# Patient Record
Sex: MALE | Race: WHITE | NOT HISPANIC OR LATINO | Employment: FULL TIME | URBAN - METROPOLITAN AREA
[De-identification: names, ages, dates, MRNs, and addresses within clinical notes are randomized per-mention and may not be internally consistent; named-entity substitution may affect disease eponyms.]

---

## 2017-01-06 ENCOUNTER — ALLSCRIPTS OFFICE VISIT (OUTPATIENT)
Dept: OTHER | Facility: OTHER | Age: 57
End: 2017-01-06

## 2017-02-03 ENCOUNTER — ALLSCRIPTS OFFICE VISIT (OUTPATIENT)
Dept: OTHER | Facility: OTHER | Age: 57
End: 2017-02-03

## 2017-04-03 ENCOUNTER — ALLSCRIPTS OFFICE VISIT (OUTPATIENT)
Dept: OTHER | Facility: OTHER | Age: 57
End: 2017-04-03

## 2017-04-03 LAB
FLUAV AG SPEC QL IA: NEGATIVE
INFLUENZA B AG (HISTORICAL): NEGATIVE

## 2017-04-17 ENCOUNTER — ALLSCRIPTS OFFICE VISIT (OUTPATIENT)
Dept: OTHER | Facility: OTHER | Age: 57
End: 2017-04-17

## 2017-04-25 ENCOUNTER — TRANSCRIBE ORDERS (OUTPATIENT)
Dept: ADMINISTRATIVE | Facility: HOSPITAL | Age: 57
End: 2017-04-25

## 2017-04-25 ENCOUNTER — APPOINTMENT (OUTPATIENT)
Dept: LAB | Facility: HOSPITAL | Age: 57
End: 2017-04-25
Payer: COMMERCIAL

## 2017-04-25 DIAGNOSIS — I10 ESSENTIAL HYPERTENSION, MALIGNANT: ICD-10-CM

## 2017-04-25 DIAGNOSIS — I10 ESSENTIAL HYPERTENSION, MALIGNANT: Primary | ICD-10-CM

## 2017-04-25 LAB — VENIPUNCTURE: NORMAL

## 2017-04-25 PROCEDURE — 36415 COLL VENOUS BLD VENIPUNCTURE: CPT

## 2017-04-26 ENCOUNTER — LAB CONVERSION - ENCOUNTER (OUTPATIENT)
Dept: OTHER | Facility: OTHER | Age: 57
End: 2017-04-26

## 2017-04-26 LAB
A/G RATIO (HISTORICAL): 1.6 (CALC) (ref 1–2.5)
ALBUMIN SERPL BCP-MCNC: 4.2 G/DL (ref 3.6–5.1)
ALP SERPL-CCNC: 102 U/L (ref 40–115)
ALT SERPL W P-5'-P-CCNC: 37 U/L (ref 9–46)
AST SERPL W P-5'-P-CCNC: 30 U/L (ref 10–35)
BASOPHILS # BLD AUTO: 0.5 %
BASOPHILS # BLD AUTO: 22 CELLS/UL (ref 0–200)
BILIRUB SERPL-MCNC: 0.8 MG/DL (ref 0.2–1.2)
BUN SERPL-MCNC: 9 MG/DL (ref 7–25)
BUN/CREA RATIO (HISTORICAL): ABNORMAL (CALC) (ref 6–22)
CALCIUM SERPL-MCNC: 9.5 MG/DL (ref 8.6–10.3)
CHLORIDE SERPL-SCNC: 105 MMOL/L (ref 98–110)
CHOLEST SERPL-MCNC: 127 MG/DL (ref 125–200)
CHOLEST/HDLC SERPL: 3 (CALC)
CO2 SERPL-SCNC: 24 MMOL/L (ref 20–31)
CREAT SERPL-MCNC: 0.85 MG/DL (ref 0.7–1.33)
DEPRECATED RDW RBC AUTO: 13.8 % (ref 11–15)
EGFR AFRICAN AMERICAN (HISTORICAL): 112 ML/MIN/1.73M2
EGFR-AMERICAN CALC (HISTORICAL): 97 ML/MIN/1.73M2
EOSINOPHIL # BLD AUTO: 1.1 %
EOSINOPHIL # BLD AUTO: 48 CELLS/UL (ref 15–500)
GAMMA GLOBULIN (HISTORICAL): 2.6 G/DL (CALC) (ref 1.9–3.7)
GLUCOSE (HISTORICAL): 105 MG/DL (ref 65–99)
HBA1C MFR BLD HPLC: 8.7 % OF TOTAL HGB
HCT VFR BLD AUTO: 42 % (ref 38.5–50)
HDLC SERPL-MCNC: 43 MG/DL
HGB BLD-MCNC: 13.8 G/DL (ref 13.2–17.1)
LDL CHOLESTEROL (HISTORICAL): 70 MG/DL (CALC)
LYMPHOCYTES # BLD AUTO: 1355 CELLS/UL (ref 850–3900)
LYMPHOCYTES # BLD AUTO: 30.8 %
MCH RBC QN AUTO: 29.3 PG (ref 27–33)
MCHC RBC AUTO-ENTMCNC: 32.9 G/DL (ref 32–36)
MCV RBC AUTO: 89.1 FL (ref 80–100)
MONOCYTES # BLD AUTO: 414 CELLS/UL (ref 200–950)
MONOCYTES (HISTORICAL): 9.4 %
NEUTROPHILS # BLD AUTO: 2561 CELLS/UL (ref 1500–7800)
NEUTROPHILS # BLD AUTO: 58.2 %
NON-HDL-CHOL (CHOL-HDL) (HISTORICAL): 84 MG/DL (CALC)
PLATELET # BLD AUTO: 206 THOUSAND/UL (ref 140–400)
PMV BLD AUTO: 10.7 FL (ref 7.5–12.5)
POTASSIUM SERPL-SCNC: 4.1 MMOL/L (ref 3.5–5.3)
RBC # BLD AUTO: 4.71 MILLION/UL (ref 4.2–5.8)
SODIUM SERPL-SCNC: 140 MMOL/L (ref 135–146)
TOTAL PROTEIN (HISTORICAL): 6.8 G/DL (ref 6.1–8.1)
TRIGL SERPL-MCNC: 70 MG/DL
TSH SERPL DL<=0.05 MIU/L-ACNC: 1.87 MIU/L (ref 0.4–4.5)
WBC # BLD AUTO: 4.4 THOUSAND/UL (ref 3.8–10.8)

## 2017-04-27 ENCOUNTER — GENERIC CONVERSION - ENCOUNTER (OUTPATIENT)
Dept: OTHER | Facility: OTHER | Age: 57
End: 2017-04-27

## 2017-05-01 ENCOUNTER — ALLSCRIPTS OFFICE VISIT (OUTPATIENT)
Dept: OTHER | Facility: OTHER | Age: 57
End: 2017-05-01

## 2017-06-19 ENCOUNTER — ALLSCRIPTS OFFICE VISIT (OUTPATIENT)
Dept: OTHER | Facility: OTHER | Age: 57
End: 2017-06-19

## 2017-06-27 ENCOUNTER — GENERIC CONVERSION - ENCOUNTER (OUTPATIENT)
Dept: OTHER | Facility: OTHER | Age: 57
End: 2017-06-27

## 2018-01-12 NOTE — MISCELLANEOUS
Provider Comments  Provider Comments:   [facility] TO [facility] APPT        Signatures   Electronically signed by : Lynette Rose DO; Sep 16 2016 11:22AM EST                       (Author)

## 2018-01-13 VITALS
DIASTOLIC BLOOD PRESSURE: 82 MMHG | BODY MASS INDEX: 31.54 KG/M2 | HEART RATE: 82 BPM | RESPIRATION RATE: 16 BRPM | SYSTOLIC BLOOD PRESSURE: 128 MMHG | TEMPERATURE: 96.8 F | HEIGHT: 73 IN | WEIGHT: 238 LBS

## 2018-01-13 VITALS
WEIGHT: 241 LBS | DIASTOLIC BLOOD PRESSURE: 86 MMHG | SYSTOLIC BLOOD PRESSURE: 148 MMHG | HEART RATE: 76 BPM | TEMPERATURE: 96.4 F | HEIGHT: 73 IN | BODY MASS INDEX: 31.94 KG/M2 | RESPIRATION RATE: 20 BRPM

## 2018-01-13 NOTE — RESULT NOTES
Discussion/Summary   Will review labs at your follow up visit with Dr Eros Perez     Verified Results  (1) CBC/PLT/DIFF 25Apr2017 09:15AM Nelida Farooq     Test Name Result Flag Reference   WHITE BLOOD CELL COUNT 4 4 Thousand/uL  3 8-10 8   RED BLOOD CELL COUNT 4 71 Million/uL  4 20-5 80   HEMOGLOBIN 13 8 g/dL  13 2-17 1   HEMATOCRIT 42 0 %  38 5-50 0   MCV 89 1 fL  80 0-100 0   MCH 29 3 pg  27 0-33 0   MCHC 32 9 g/dL  32 0-36 0   RDW 13 8 %  11 0-15 0   PLATELET COUNT 849 Thousand/uL  140-400   ABSOLUTE NEUTROPHILS 2561 cells/uL  5902-7395   ABSOLUTE LYMPHOCYTES 1355 cells/uL  850-3900   ABSOLUTE MONOCYTES 414 cells/uL  200-950   ABSOLUTE EOSINOPHILS 48 cells/uL     ABSOLUTE BASOPHILS 22 cells/uL  0-200   NEUTROPHILS 58 2 %     LYMPHOCYTES 30 8 %     MONOCYTES 9 4 %     EOSINOPHILS 1 1 %     BASOPHILS 0 5 %     MPV 10 7 fL  7 5-12 5     (1) COMPREHENSIVE METABOLIC PANEL [id number] 67:78YN Nelida Farooq     Test Name Result Flag Reference   GLUCOSE 105 mg/dL H 65-99   Fasting reference interval     For someone without known diabetes, a glucose value  between 100 and 125 mg/dL is consistent with  prediabetes and should be confirmed with a  follow-up test    UREA NITROGEN (BUN) 9 mg/dL  7-25   CREATININE 0 85 mg/dL  0 70-1 33   For patients >52years of age, the reference limit  for Creatinine is approximately 13% higher for people  identified as -American  eGFR NON-AFR   AMERICAN 97 mL/min/1 73m2  > OR = 60   eGFR AFRICAN AMERICAN 112 mL/min/1 73m2  > OR = 60   BUN/CREATININE RATIO   9-01   NOT APPLICABLE (calc)   SODIUM 140 mmol/L  135-146   POTASSIUM 4 1 mmol/L  3 5-5 3   CHLORIDE 105 mmol/L     CARBON DIOXIDE 24 mmol/L  20-31   CALCIUM 9 5 mg/dL  8 6-10 3   PROTEIN, TOTAL 6 8 g/dL  6 1-8 1   ALBUMIN 4 2 g/dL  3 6-5 1   GLOBULIN 2 6 g/dL (calc)  1 9-3 7   ALBUMIN/GLOBULIN RATIO 1 6 (calc)  1 0-2 5   BILIRUBIN, TOTAL 0 8 mg/dL  0 2-1 2   ALKALINE PHOSPHATASE 102 U/L     AST 30 U/L  10-35   ALT 37 U/L  9-46     (1) LIPID PANEL, FASTING 25Apr2017 09:15AM Nelida Farooq     Test Name Result Flag Reference   CHOLESTEROL, TOTAL 127 mg/dL  125-200   HDL CHOLESTEROL 43 mg/dL  > OR = 40   TRIGLICERIDES 70 mg/dL  <344   LDL-CHOLESTEROL 70 mg/dL (calc)  <130   Desirable range <100 mg/dL for patients with CHD or  diabetes and <70 mg/dL for diabetic patients with  known heart disease  CHOL/HDLC RATIO 3 0 (calc)  < OR = 5 0   NON HDL CHOLESTEROL 84 mg/dL (calc)     Target for non-HDL cholesterol is 30 mg/dL higher than   LDL cholesterol target  (Q) TSH, 3RD GENERATION W/REFLEX TO FT4 25Apr2017 09:15AM Nelida Farooq     Test Name Result Flag Reference   TSH W/REFLEX TO FT4 1 87 mIU/L  0 40-4 50     (Q) HEMOGLOBIN A1c 25Apr2017 09:15AM Nelida Farooq     Test Name Result Flag Reference   HEMOGLOBIN A1c 8 7 % of total Hgb H <5 7   For someone without known diabetes, a hemoglobin A1c  value of 6 5% or greater indicates that they may have   diabetes and this should be confirmed with a follow-up   test      For someone with known diabetes, a value <7% indicates   that their diabetes is well controlled and a value   greater than or equal to 7% indicates suboptimal   control  A1c targets should be individualized based on   duration of diabetes, age, comorbid conditions, and   other considerations  Currently, no consensus exists regarding use of  hemoglobin A1c for diagnosis of diabetes for children  Signatures   Electronically signed by : Sukumar Honeycutt;  Apr 27 2017  8:29AM EST                       (Author)

## 2018-01-14 VITALS
SYSTOLIC BLOOD PRESSURE: 138 MMHG | HEIGHT: 73 IN | RESPIRATION RATE: 20 BRPM | DIASTOLIC BLOOD PRESSURE: 80 MMHG | WEIGHT: 230 LBS | HEART RATE: 93 BPM | BODY MASS INDEX: 30.48 KG/M2 | TEMPERATURE: 97 F

## 2018-01-14 NOTE — PROGRESS NOTES
Assessment    1  Encounter for preventive health examination (V70 0) (Z00 00)   2  Diabetes mellitus type 2, uncontrolled (250 02) (E11 65)   3  Benign essential hypertension (401 1) (I10)   4  Hypercholesterolemia (272 0) (E78 0)   5  Occult blood in stools (792 1) (R19 5)   6  BMI 30 0-30 9,adult (V85 30) (Z68 30)    Plan  Acute bilateral low back pain without sciatica    · TraMADol HCl - 50 MG Oral Tablet  Acute bilateral low back pain without sciatica, Neck pain    · Cyclobenzaprine HCl - 10 MG Oral Tablet  Benign essential hypertension    · Lisinopril 5 MG Oral Tablet (Prinivil); TAKE 1 TABLET DAILY  Benign essential hypertension, Diabetes mellitus type 2, uncontrolled,  Hypercholesterolemia, Occult blood in stools    · (1) CBC/PLT/DIFF; Status:Active; Requested for:91Rpk2120;    · (1) COMPREHENSIVE METABOLIC PANEL; Status:Active; Requested for:34Ags7674;    · (1) HEMOGLOBIN A1C; Status:Active; Requested for:26Zas5215;    · (1) LIPID PANEL FASTING W DIRECT LDL REFLEX; Status:Active; Requested  for:53Gae4090; Health Maintenance    · Eat a low fat and low cholesterol diet ; Status:Complete;   Done: 77SXA5395   · Eat no more than 30 grams of fat per day ; Status:Complete;   Done: 84KEE2607   · Some eating tips that can help you lose weight ; Status:Complete;   Done: 75SSM7852   · We recommend a colonoscopy ; Status:Complete;   Done: 14ZUR3313  Hypercholesterolemia    · Pravastatin Sodium 10 MG Oral Tablet; TAKE 1 TABLET AT BEDTIME  Occult blood in stools    · COLONOSCOPY; Status:Active; Requested XSI:29FDE8011;    · Hemoccult Screening - POC; Status:Active;  Requested NDN:82KXW7649;    · 1 - Sonia Bile Cristopher LANDRUM (Gastroenterology) Physician Referral  Consult  Status: Active   Requested for: 53JRM8060  Care Summary provided  : Yes  PMH: Controlled insulin dependent diabetes mellitus    · GlipiZIDE ER 2 5 MG Oral Tablet Extended Release 24 Hour; take 1 tablet  every twelve hours   · MetFORMIN HCl - 1000 MG Oral Tablet; TAKE 1 TABLET TWICE DAILY WITH  MEALS    Discussion/Summary  Impression: health maintenance visit  Currently, he eats a poor diet and has an inadequate exercise regimen  Prostate cancer screening: prostate cancer screening is current  Colorectal cancer screening: the risks and benefits of colorectal cancer screening were discussed and need asap since hemoccult + in office  The risks and benefits of immunizations were discussed and pt fabio check with insurance  Patient discussion: discussed with the patient  HM- given for GI  needs c-scope due to hemoccult + in office  discussed healthy diet and increasing exercise  pt will call insurance to see if immunizations are covered  DM, HTN, HYPERLIPDEMIA-- please be compliant with medication and will repeat labs in 3 months  f/u in 3 months  Possible side effects of new medications were reviewed with the patient/guardian today  The patient was counseled regarding diagnostic results, instructions for management, risk factor reductions, prognosis, impressions, risks and benefits of treatment options, importance of compliance with treatment  Chief Complaint  Patient presents for PHYS - review labs (dli)      History of Present Illness  HM, Adult Male: The patient is being seen for a health maintenance evaluation  General Health: He has regular dental visits  He denies vision problems  Immunizations status: not up to date   pt wants to check with insurance before getting immunizations  Lifestyle:  He does not have a healthy diet  He exercises regularly  He does not use tobacco  He denies alcohol use  He denies drug use  Reproductive health:  the patient is sexually active  occasional    Screening: Prostate cancer screening includes last prostate-specific antigen testing 6/7/16  Colorectal cancer screening includes no previous colonoscopy  Metabolic screening includes lipid profile performed  and glucose screening performed        Cardiovascular risk factors: hypertension and diabetes  Safety elements used: smoke detector and carbon monoxide detector  HPI: Pt seen and examined  Here for a physical and bw  Pt was off his dm medication when he had the bw done  Has not had c-scope  Has had recent ophtho exam    no complaints except arthralgias      Review of Systems    Constitutional: not feeling poorly and not feeling tired  Eyes: but wears glasses, but no eyesight problems  ENT: no earache  Cardiovascular: the heart rate was not slow, no chest pain, no intermittent leg claudication, the heart rate was not fast, no palpitations and no extremity edema  Respiratory: no orthopnea, no shortness of breath during exertion and no PND  Gastrointestinal: diarrhea and has had diarrhea x 1 day, but no abdominal pain, no nausea and no vomiting  Genitourinary: No complaints of dysuria, no incontinence, no hesitancy, no nocturia, no genital lesion, no testicular pain  Musculoskeletal: arthralgias, joint stiffness and knees, but no joint swelling, no limb pain, no myalgias and no limb swelling  Neurological: no headache  Psychiatric: no depression  Hematologic/Lymphatic: no swollen glands  Active Problems    1  Acute bilateral low back pain without sciatica (724 2,338 19) (M54 5)   2  Arthritis (716 90) (M19 90)   3  Benign essential hypertension (401 1) (I10)   4  Diabetes mellitus type 2, uncontrolled (250 02) (E11 65)   5  Encounter for screening colonoscopy (V76 51) (Z12 11)   6  Ganglion Of The Left Wrist (727 42)   7  Heart disease (429 9) (I51 9)   8  Hypercholesterolemia (272 0) (E78 0)   9  Knee pain (719 46) (M25 569)    Past Medical History    · History of Acute URI (465 9) (J06 9)   · History of Back sprain (847 9)   · History of Controlled insulin dependent diabetes mellitus (250 00,V58 67) (E11 9,Z79  4)   · History of Ganglion (727 43) (M67 40)   · History of backache (V13 59) (Z87 39)   · History of hyperlipidemia (V12 29) (Z86 39)   · History of type 2 diabetes mellitus (V12 29) (Z86 39)   · History of Neck pain (723 1) (M54 2)   · History of Screening PSA (prostate specific antigen) (V76 44) (Z12 5)   · History of Status post total right knee replacement (V43 65) (Z96 651)   · History of Symptoms referable to forearm joint (719 63) (R29 91)   · History of Thrombophlebitis of deep vein of right calf (451 19) (I80 291)    Surgical History    · History of Knee Replacement   · History of Knee Surgery   · History of Wrist Surgery    Family History  Family History    · Family history of Cancer    Social History    · Denied: History of Alcohol Use (History)   · Daily Cola Consumption (___ Cans/Day)   ·    · Never A Smoker    Current Meds   1  Fredis Contour Next Test In [employer]; USE 1 STRIP 3 times daily upon awake, before   sleep and as needed; Therapy: 47MOY7419 to (Olga Jackson)  Requested for: 98EPA7296; Last   Rx:11Sdr0227 Ordered   2  BD Test STRP; USE 1 STRIP TWICE DAILY; Therapy: 80ZDL7597 to (Evaluate:58Vzo4887)  Requested for: 09NMY7146; Last   Rx:16Jan2015 Ordered   3  Cyclobenzaprine HCl - 10 MG Oral Tablet; TAKE 1 TABLET 3 TIMES DAILY AS NEEDED; Therapy: 48VFM7492 to (Anay Grayson)  Requested for: 09HUE8500; Last   Rx:32Zbd1916 Ordered   4  GlipiZIDE ER 2 5 MG Oral Tablet Extended Release 24 Hour; take 1 tablet every twelve   hours; Therapy: 54GXK6063 to (Evaluate:01Jun2016)  Requested for: 06TOY5426; Last   Rx:49Tiu6761 Ordered   5  Lisinopril 5 MG Oral Tablet; TAKE 1 TABLET DAILY  Requested for: 61FNT1661; Last   XF:37VIQ4800 Ordered   6  Meloxicam 15 MG Oral Tablet; TAKE 1 TABLET DAILY AS NEEDED; Therapy: 84ZSU5545 to (Anay Grayson)  Requested for: 67GST1641; Last   Rx:48Upn0140 Ordered   7  MetFORMIN HCl - 1000 MG Oral Tablet; TAKE 1 TABLET TWICE DAILY WITH MEALS    Requested for: 28FGX8062; Last EJ:03BDC6035 Ordered   8   Pravastatin Sodium 10 MG Oral Tablet; TAKE 1 TABLET AT BEDTIME Requested for:   31YKD6231; Last HE:92XHP3341 Ordered   9  TraMADol HCl - 50 MG Oral Tablet; TAKE 1 TABLET Every 8 hours PRN for break through   pain; Therapy: 96ZYQ9270 to (Evaluate:00Cae5977); Last HI:41YQR4728 Ordered    Allergies    1  No Known Drug Allergies    Vitals   Recorded: 13Jun2016 08:49AM   Temperature 97 5 F   Heart Rate 82   Respiration 16   Systolic 609   Diastolic 793   Height 6 ft 1 in   Weight 228 lb    BMI Calculated 30 08   BSA Calculated 2 27   O2 Saturation 95, RA     Physical Exam    Constitutional   General appearance: No acute distress, well appearing and well nourished  Head and Face   Head and face: Normal     Eyes   Conjunctiva and lids: No erythema, swelling or discharge  Pupils and irises: Equal, round, reactive to light  Ears, Nose, Mouth, and Throat   External inspection of ears and nose: Normal     Nasal mucosa, septum, and turbinates: Normal without edema or erythema  Lips, teeth, and gums: Normal, good dentition  Neck   Neck: Supple, symmetric, trachea midline, no masses  Thyroid: Normal, no thyromegaly  Pulmonary   Respiratory effort: No increased work of breathing or signs of respiratory distress  Auscultation of lungs: Clear to auscultation  Cardiovascular   Auscultation of heart: Normal rate and rhythm, normal S1 and S2, no murmurs  Carotid pulses: 2+ bilaterally  Pedal pulses: 2+ bilaterally  Peripheral vascular exam: Normal     Examination of extremities for edema and/or varicosities: Normal     Abdomen   Abdomen: Non-tender, no masses  Liver and spleen: No hepatomegaly or splenomegaly  Anus, perineum, and rectum: Normal sphincter tone, no masses, no prolapse  Stool sample for occult blood: Abnormal   The stool was positive for occult blood  Lymphatic   Palpation of lymph nodes in neck: No lymphadenopathy      Musculoskeletal   Gait and station: Normal     Inspection/palpation of digits and nails: Normal without clubbing or cyanosis  Psychiatric   Judgment and insight: Normal     Orientation to person, place and time: Normal     Recent and remote memory: Intact  Mood and affect: Normal        Results/Data  PHQ-2 Adult Depression Screening 13Jun2016 08:53AM User, Klaus     Test Name Result Flag Reference   PHQ-2 Adult Depression Score 0     Over the last two weeks, how often have you been bothered by any of the following problems? Little interest or pleasure in doing things: Not at all - 0  Feeling down, depressed, or hopeless: Not at all - 0   PHQ-2 Adult Depression Screening Negative       (Q) LIPID PANEL WITH REFLEX TO DIRECT LDL 49LVX3448 10:37AM Patrick Syed     Test Name Result Flag Reference   CHOLESTEROL, TOTAL 135 mg/dL  125-200   HDL CHOLESTEROL 39 mg/dL L > OR = 40   TRIGLICERIDES 219 mg/dL  <150   LDL-CHOLESTEROL 75 mg/dL (calc)  <130   Desirable range <100 mg/dL for patients with CHD or  diabetes and <70 mg/dL for diabetic patients with  known heart disease  CHOL/HDLC RATIO 3 5 (calc)  < OR = 5 0   NON HDL CHOLESTEROL 96 mg/dL (calc)     Target for non-HDL cholesterol is 30 mg/dL higher than   LDL cholesterol target  (1) COMPREHENSIVE METABOLIC PANEL 94VHI9577 73:29TE Patrick Syed     Test Name Result Flag Reference   GLUCOSE 178 mg/dL H 65-99   Fasting reference interval   UREA NITROGEN (BUN) 10 mg/dL  7-25   CREATININE 0 83 mg/dL  0 70-1 33   For patients >52years of age, the reference limit  for Creatinine is approximately 13% higher for people  identified as -American  eGFR NON-AFR   AMERICAN 98 mL/min/1 73m2  > OR = 60   eGFR AFRICAN AMERICAN 114 mL/min/1 73m2  > OR = 60   BUN/CREATININE RATIO   3-40   NOT APPLICABLE (calc)   SODIUM 139 mmol/L  135-146   POTASSIUM 4 4 mmol/L  3 5-5 3   CHLORIDE 104 mmol/L     CARBON DIOXIDE 24 mmol/L  19-30   CALCIUM 9 7 mg/dL  8 6-10 3   PROTEIN, TOTAL 6 8 g/dL  6 1-8 1   ALBUMIN 4 4 g/dL  3 6-5 1   GLOBULIN 2 4 g/dL (calc)  1 9-3 7   ALBUMIN/GLOBULIN RATIO 1 8 (calc)  1 0-2 5   BILIRUBIN, TOTAL 0 5 mg/dL  0 2-1 2   ALKALINE PHOSPHATASE 90 U/L     AST 14 U/L  10-35   ALT 21 U/L  9-46     (1) PSA (SCREEN) (Dx V76 44 Screen for Prostate Cancer) 36JMV0678 10:37AM Daralyn Code     Test Name Result Flag Reference   PSA, TOTAL 0 5 ng/mL  < OR = 4 0   This test was performed using the Siemens  chemiluminescent method  Values obtained from  different assay methods cannot be used  interchangeably  PSA levels, regardless of  value, should not be interpreted as absolute  evidence of the presence or absence of disease  (Q) HEMOGLOBIN A1c 14JEB6263 10:37AM "[employer]" Code     Test Name Result Flag Reference   HEMOGLOBIN A1c 9 0 % of total Hgb H <5 7   According to ADA guidelines, hemoglobin A1c <7 0%  represents optimal control in non-pregnant diabetic  patients  Different metrics may apply to specific  patient populations  Standards of Medical Care in    Diabetes Care  2013;36:s11-s66     For the purpose of screening for the presence of  diabetes  <5 7%       Consistent with the absence of diabetes  5 7-6 4%    Consistent with increased risk for diabetes              (prediabetes)  >or=6 5%    Consistent with diabetes     This assay result is consistent with diabetes  mellitus  Currently, no consensus exists for use of hemoglobin  A1c for diagnosis of diabetes for children       (Q) MICROALBUMIN, RANDOM URINE (W/CREATININE) 90YCY4879 10:37AM [employer] Code     Test Name Result Flag Reference   CREATININE, RANDOM URINE 180 mg/dL     MICROALBUMIN 0 7 mg/dL     Reference Range  Not established   MICROALBUMIN/CREATININE$RATIO, RANDOM URINE 4 mcg/mg creat  <30   The ADA defines abnormalities in albumin  excretion as follows:     Category         Result (mcg/mg creatinine)     Normal                    <30  Microalbuminuria            Clinical albuminuria   > OR = 300     The ADA recommends that at least two of three  specimens collected within a 3-6 month period be  abnormal before considering a patient to be  within a diagnostic category  See Below     We received an order for microalbumin and collected  and performed a random urine microalbumin with  creatinine assay  If this is not what you intended  to order, please contact your local client service  representative immediately so that we can adjust  our billing appropriately  You may also inquire about  alternative or additional testing         Future Appointments    Date/Time Provider Specialty Site   09/16/2016 11:00 AM Dai Fuentes DO Family [facility]     Signatures   Electronically signed by : Devang Monsalve DO; Jun 13 2016  9:38AM EST                       (Author)

## 2018-01-15 VITALS
WEIGHT: 236 LBS | SYSTOLIC BLOOD PRESSURE: 124 MMHG | BODY MASS INDEX: 31.28 KG/M2 | RESPIRATION RATE: 16 BRPM | HEIGHT: 73 IN | TEMPERATURE: 96.9 F | DIASTOLIC BLOOD PRESSURE: 82 MMHG | HEART RATE: 80 BPM

## 2018-01-15 VITALS
RESPIRATION RATE: 16 BRPM | TEMPERATURE: 97.8 F | BODY MASS INDEX: 30.48 KG/M2 | DIASTOLIC BLOOD PRESSURE: 78 MMHG | HEIGHT: 73 IN | SYSTOLIC BLOOD PRESSURE: 120 MMHG | HEART RATE: 76 BPM | WEIGHT: 230 LBS

## 2018-01-15 NOTE — MISCELLANEOUS
Message  GI Reminder Recall Festus Hem:   Date: 06/27/2017   Dear Jolynn Bustamante:     Review of our records shows you are due for the following: CONSULT  Please call the following office to schedule your appointment:   [address] [place], [place], [place] [address] [phone]  We look forward to hearing from you!      Sincerely,     [facility]STR[facility]      Signatures   Electronically signed by : Katelin Tamez, ; Jun 27 2017 10:23AM EST                       (Author)

## 2018-01-15 NOTE — RESULT NOTES
Message   pt has appt with me on 6/13  please print out labs to discuss at appt  RL     Verified Results  (Q) LIPID PANEL WITH REFLEX TO DIRECT LDL [id number] 10:37AM Brianda SIZESEEKER     Test Name Result Flag Reference   CHOLESTEROL, TOTAL 135 mg/dL  125-200   HDL CHOLESTEROL 39 mg/dL L > OR = 40   TRIGLICERIDES 160 mg/dL  <150   LDL-CHOLESTEROL 75 mg/dL (calc)  <130   Desirable range <100 mg/dL for patients with CHD or  diabetes and <70 mg/dL for diabetic patients with  known heart disease  CHOL/HDLC RATIO 3 5 (calc)  < OR = 5 0   NON HDL CHOLESTEROL 96 mg/dL (calc)     Target for non-HDL cholesterol is 30 mg/dL higher than   LDL cholesterol target  (1) COMPREHENSIVE METABOLIC PANEL 01PBG3403 31:84EY BriandaShicon     Test Name Result Flag Reference   GLUCOSE 178 mg/dL H 65-99   Fasting reference interval   UREA NITROGEN (BUN) 10 mg/dL  7-25   CREATININE 0 83 mg/dL  0 70-1 33   For patients >52years of age, the reference limit  for Creatinine is approximately 13% higher for people  identified as -American  eGFR NON-AFR  AMERICAN 98 mL/min/1 73m2  > OR = 60   eGFR AFRICAN AMERICAN 114 mL/min/1 73m2  > OR = 60   BUN/CREATININE RATIO   4-11   NOT APPLICABLE (calc)   SODIUM 139 mmol/L  135-146   POTASSIUM 4 4 mmol/L  3 5-5 3   CHLORIDE 104 mmol/L     CARBON DIOXIDE 24 mmol/L  19-30   CALCIUM 9 7 mg/dL  8 6-10 3   PROTEIN, TOTAL 6 8 g/dL  6 1-8 1   ALBUMIN 4 4 g/dL  3 6-5 1   GLOBULIN 2 4 g/dL (calc)  1 9-3 7   ALBUMIN/GLOBULIN RATIO 1 8 (calc)  1 0-2 5   BILIRUBIN, TOTAL 0 5 mg/dL  0 2-1 2   ALKALINE PHOSPHATASE 90 U/L     AST 14 U/L  10-35   ALT 21 U/L  9-46     (1) PSA (SCREEN) (Dx V76 44 Screen for Prostate Cancer) [id number] 10:37AM Brianda SIZESEEKER     Test Name Result Flag Reference   PSA, TOTAL 0 5 ng/mL  < OR = 4 0   This test was performed using the Siemens  chemiluminescent method  Values obtained from  different assay methods cannot be used  interchangeably   PSA levels, regardless of  value, should not be interpreted as absolute  evidence of the presence or absence of disease  (Q) HEMOGLOBIN A1c [id number] 10:37AM Celina Tillman     Test Name Result Flag Reference   HEMOGLOBIN A1c 9 0 % of total Hgb H <5 7   According to ADA guidelines, hemoglobin A1c <7 0%  represents optimal control in non-pregnant diabetic  patients  Different metrics may apply to specific  patient populations  Standards of Medical Care in    Diabetes Care  2013;36:s11-s66     For the purpose of screening for the presence of  diabetes  <5 7%       Consistent with the absence of diabetes  5 7-6 4%    Consistent with increased risk for diabetes              (prediabetes)  >or=6 5%    Consistent with diabetes     This assay result is consistent with diabetes  mellitus  Currently, no consensus exists for use of hemoglobin  A1c for diagnosis of diabetes for children         Signatures   Electronically signed by : Jorge Lombardo DO; Jun 7 2016  9:56AM EST                       (Author)

## 2018-01-17 NOTE — MISCELLANEOUS
Provider Comments  Provider Comments:   Called patient regarding missed appointment for 2/3/17 to establish care  Left message to machine to call back to reschedule   Jacklyn Christy      Signatures   Electronically signed by : Galina Yin DO; Feb  3 2017  2:49PM EST                       (Author)

## 2018-02-13 DIAGNOSIS — E11.22 CKD STAGE 1 DUE TO TYPE 2 DIABETES MELLITUS (HCC): Primary | ICD-10-CM

## 2018-02-13 DIAGNOSIS — N18.1 CKD STAGE 1 DUE TO TYPE 2 DIABETES MELLITUS (HCC): Primary | ICD-10-CM

## 2018-02-13 RX ORDER — LISINOPRIL 5 MG/1
1 TABLET ORAL DAILY
COMMUNITY
End: 2018-02-13 | Stop reason: SDUPTHER

## 2018-02-14 PROBLEM — E11.22 CKD STAGE 1 DUE TO TYPE 2 DIABETES MELLITUS (HCC): Status: ACTIVE | Noted: 2017-01-29

## 2018-02-14 PROBLEM — N18.1 CKD STAGE 1 DUE TO TYPE 2 DIABETES MELLITUS (HCC): Status: ACTIVE | Noted: 2017-01-29

## 2018-02-14 PROBLEM — E78.6 LOW HDL (UNDER 40): Status: ACTIVE | Noted: 2017-01-29

## 2018-02-14 PROBLEM — H81.10 BPPV (BENIGN PAROXYSMAL POSITIONAL VERTIGO): Status: ACTIVE | Noted: 2017-06-19

## 2018-02-14 PROBLEM — R60.0 EDEMA EXTREMITIES: Status: ACTIVE | Noted: 2017-06-12

## 2018-02-14 PROBLEM — E11.29 DM (DIABETES MELLITUS), TYPE 2 WITH RENAL COMPLICATIONS (HCC): Status: ACTIVE | Noted: 2017-01-29

## 2018-02-14 RX ORDER — PRAVASTATIN SODIUM 10 MG
1 TABLET ORAL DAILY
COMMUNITY
End: 2018-06-20 | Stop reason: SDUPTHER

## 2018-02-14 RX ORDER — PROCHLORPERAZINE MALEATE 10 MG
1 TABLET ORAL EVERY 8 HOURS PRN
COMMUNITY
Start: 2017-06-19 | End: 2018-06-20

## 2018-02-14 RX ORDER — GLIPIZIDE 5 MG/1
1 TABLET, FILM COATED, EXTENDED RELEASE ORAL DAILY
COMMUNITY
Start: 2015-01-06 | End: 2018-03-30 | Stop reason: SDUPTHER

## 2018-02-14 RX ORDER — LISINOPRIL 5 MG/1
5 TABLET ORAL DAILY
Qty: 30 TABLET | Refills: 0 | Status: SHIPPED | OUTPATIENT
Start: 2018-02-14 | End: 2018-06-20 | Stop reason: SDUPTHER

## 2018-03-30 DIAGNOSIS — E11.29 TYPE 2 DIABETES MELLITUS WITH OTHER DIABETIC KIDNEY COMPLICATION, WITHOUT LONG-TERM CURRENT USE OF INSULIN (HCC): Primary | ICD-10-CM

## 2018-03-30 RX ORDER — GLIPIZIDE 5 MG/1
5 TABLET, FILM COATED, EXTENDED RELEASE ORAL DAILY
Qty: 30 TABLET | Refills: 0 | Status: SHIPPED | OUTPATIENT
Start: 2018-03-30 | End: 2018-05-08 | Stop reason: SDUPTHER

## 2018-05-08 ENCOUNTER — TELEPHONE (OUTPATIENT)
Dept: FAMILY MEDICINE CLINIC | Facility: CLINIC | Age: 58
End: 2018-05-08

## 2018-05-08 DIAGNOSIS — E11.29 TYPE 2 DIABETES MELLITUS WITH OTHER DIABETIC KIDNEY COMPLICATION, WITHOUT LONG-TERM CURRENT USE OF INSULIN (HCC): ICD-10-CM

## 2018-05-08 RX ORDER — GLIPIZIDE 5 MG/1
5 TABLET, FILM COATED, EXTENDED RELEASE ORAL DAILY
Qty: 21 TABLET | Refills: 0 | Status: SHIPPED | OUTPATIENT
Start: 2018-05-08 | End: 2018-06-08 | Stop reason: SDUPTHER

## 2018-06-07 DIAGNOSIS — E11.29 TYPE 2 DIABETES MELLITUS WITH OTHER DIABETIC KIDNEY COMPLICATION, WITHOUT LONG-TERM CURRENT USE OF INSULIN (HCC): ICD-10-CM

## 2018-06-08 ENCOUNTER — TELEPHONE (OUTPATIENT)
Dept: FAMILY MEDICINE CLINIC | Facility: CLINIC | Age: 58
End: 2018-06-08

## 2018-06-08 DIAGNOSIS — E11.29 TYPE 2 DIABETES MELLITUS WITH OTHER DIABETIC KIDNEY COMPLICATION, WITHOUT LONG-TERM CURRENT USE OF INSULIN (HCC): ICD-10-CM

## 2018-06-08 RX ORDER — GLIPIZIDE 5 MG/1
5 TABLET, FILM COATED, EXTENDED RELEASE ORAL DAILY
Qty: 30 TABLET | Refills: 0 | Status: SHIPPED | OUTPATIENT
Start: 2018-06-08 | End: 2018-06-20 | Stop reason: SDUPTHER

## 2018-06-08 NOTE — TELEPHONE ENCOUNTER
Pt made an apt    Diabetic medication 30 day supply sent to the pharmacy  [facility]

## 2018-06-20 ENCOUNTER — OFFICE VISIT (OUTPATIENT)
Dept: FAMILY MEDICINE CLINIC | Facility: CLINIC | Age: 58
End: 2018-06-20
Payer: COMMERCIAL

## 2018-06-20 VITALS
DIASTOLIC BLOOD PRESSURE: 72 MMHG | HEIGHT: 73 IN | TEMPERATURE: 97.6 F | SYSTOLIC BLOOD PRESSURE: 120 MMHG | RESPIRATION RATE: 16 BRPM | BODY MASS INDEX: 31.41 KG/M2 | WEIGHT: 237 LBS | HEART RATE: 78 BPM

## 2018-06-20 DIAGNOSIS — Z12.5 PROSTATE CANCER SCREENING: ICD-10-CM

## 2018-06-20 DIAGNOSIS — E11.22 CKD STAGE 1 DUE TO TYPE 2 DIABETES MELLITUS (HCC): Primary | ICD-10-CM

## 2018-06-20 DIAGNOSIS — N18.1 CKD STAGE 1 DUE TO TYPE 2 DIABETES MELLITUS (HCC): Primary | ICD-10-CM

## 2018-06-20 DIAGNOSIS — E78.5 HYPERLIPIDEMIA LDL GOAL <100: ICD-10-CM

## 2018-06-20 DIAGNOSIS — Z12.11 COLON CANCER SCREENING: ICD-10-CM

## 2018-06-20 DIAGNOSIS — E11.29 TYPE 2 DIABETES MELLITUS WITH OTHER DIABETIC KIDNEY COMPLICATION, WITHOUT LONG-TERM CURRENT USE OF INSULIN (HCC): ICD-10-CM

## 2018-06-20 PROBLEM — H81.10 BPPV (BENIGN PAROXYSMAL POSITIONAL VERTIGO): Status: RESOLVED | Noted: 2017-06-19 | Resolved: 2018-06-20

## 2018-06-20 PROCEDURE — 4010F ACE/ARB THERAPY RXD/TAKEN: CPT | Performed by: FAMILY MEDICINE

## 2018-06-20 PROCEDURE — 3008F BODY MASS INDEX DOCD: CPT | Performed by: FAMILY MEDICINE

## 2018-06-20 PROCEDURE — 99214 OFFICE O/P EST MOD 30 MIN: CPT | Performed by: FAMILY MEDICINE

## 2018-06-20 RX ORDER — PRAVASTATIN SODIUM 10 MG
10 TABLET ORAL DAILY
Qty: 30 TABLET | Refills: 5 | Status: SHIPPED | OUTPATIENT
Start: 2018-06-20 | End: 2018-09-16

## 2018-06-20 RX ORDER — LISINOPRIL 5 MG/1
5 TABLET ORAL DAILY
Qty: 30 TABLET | Refills: 5 | Status: SHIPPED | OUTPATIENT
Start: 2018-06-20 | End: 2018-09-16

## 2018-06-20 RX ORDER — GLIPIZIDE 5 MG/1
5 TABLET, FILM COATED, EXTENDED RELEASE ORAL DAILY
Qty: 30 TABLET | Refills: 5 | Status: SHIPPED | OUTPATIENT
Start: 2018-06-20 | End: 2018-09-16

## 2018-06-20 NOTE — PROGRESS NOTES
Assessment/Plan:    No problem-specific Assessment & Plan notes found for this encounter  If a1c over 10 or fbs >200, f/u 1m  ckd unchanged  No htn per pt, reassess urine albumin  Use of statins for the purposes of CVD/CVA risks reduction was advised according to USPSTF guidelines along with appropriate continued liver monitoring   q3m necessary to cont care advised     Diagnoses and all orders for this visit:    CKD stage 1 due to type 2 diabetes mellitus (HCC)  -     lisinopril (ZESTRIL) 5 mg tablet; Take 1 tablet (5 mg total) by mouth daily    Type 2 diabetes mellitus with other diabetic kidney complication, without long-term current use of insulin (HCC)  -     glipiZIDE (GLUCOTROL XL) 5 mg 24 hr tablet; Take 1 tablet (5 mg total) by mouth daily  -     metFORMIN (GLUCOPHAGE) 1000 MG tablet; Take 1 tablet (1,000 mg total) by mouth 2 (two) times a day with meals  -     Comprehensive metabolic panel; Future  -     Hemoglobin A1C; Future  -     Microalbumin / creatinine urine ratio    Hyperlipidemia LDL goal <100  -     Lipid Panel with Direct LDL reflex; Future  -     pravastatin (PRAVACHOL) 10 mg tablet; Take 1 tablet (10 mg total) by mouth daily    Colon cancer screening  -     Ambulatory referral to Gastroenterology; Future    Prostate cancer screening  -     PSA, Total Screen; Future              Return in about 3 months (around 9/20/2018) for Recheck  Subjective:      Patient ID: Nasim Sanchez  is a 61 y o  male      Chief Complaint   Patient presents with    Follow-up     follow up on medications drhlpn       HPI  No smbg for a while  Does not feel bad  Not following diabetic diet  Been to diabetic education in past  Not on low fat diet  Tolerated meds  Nocturia x 1  Works nights  Running out of meds      The following portions of the patient's history were reviewed and updated as appropriate: allergies, current medications, past family history, past medical history, past social history, past surgical history and problem list     Review of Systems   Constitutional: Negative for fever  Respiratory: Negative for shortness of breath  Cardiovascular: Negative for chest pain  Current Outpatient Prescriptions   Medication Sig Dispense Refill    glipiZIDE (GLUCOTROL XL) 5 mg 24 hr tablet Take 1 tablet (5 mg total) by mouth daily 30 tablet 5    lisinopril (ZESTRIL) 5 mg tablet Take 1 tablet (5 mg total) by mouth daily 30 tablet 5    metFORMIN (GLUCOPHAGE) 1000 MG tablet Take 1 tablet (1,000 mg total) by mouth 2 (two) times a day with meals 60 tablet 5    pravastatin (PRAVACHOL) 10 mg tablet Take 1 tablet (10 mg total) by mouth daily 30 tablet 5    glucose blood test strip by In Vitro route       No current facility-administered medications for this visit  Objective:    /72   Pulse 78   Temp 97 6 °F (36 4 °C)   Resp 16   Ht 6' 1" (1 854 m)   Wt 108 kg (237 lb)   BMI 31 27 kg/m²        Physical Exam   Constitutional: He appears well-developed  HENT:   Head: Normocephalic  Eyes: Conjunctivae are normal    Neck: Neck supple  Cardiovascular: Normal rate and intact distal pulses  Pulmonary/Chest: Effort normal  No respiratory distress  Abdominal: Soft  Musculoskeletal: He exhibits no edema or deformity  Neurological: He is alert  Skin: Skin is warm and dry  Psychiatric: His behavior is normal  Thought content normal    Nursing note and vitals reviewed        Foot exam done       Mark Blue DO

## 2018-06-20 NOTE — PATIENT INSTRUCTIONS
TEST STRIPS for Diabetes monitoring can be prescribed to your pharmacy  Since the test strips are unique to the [facility] of the glucose meter, it is in your best economic interest to find out the SPECIFIC BRAND and TYPE of test strip covered by your insurance company  You can find this out from your insurance company or participating pharmacy  Once you obtain this information, we can prescribe the specific branded test strip of your choosing  As a Type 2 Diabetic, it would be informative to check and record your blood sugars at home to help manage sugar control and identify any fluctuations that may be due to diet/exercise  You can check your sugar ONCE A DAY at [facility] DIFFERENT times in an alternating fashion: (1) before BREAKFAST, then the next day (2) before LUNCH, the following day (3) before DINNER, and the following day (4) before BEDTIME (then start over again)  Identifying trends may help you determine if changes are needed to what you eat, when you eat it, your activity level, and your medications  You should bring this records with you to your Diabetes follow-up appointments every 3 months

## 2018-06-27 LAB
ALBUMIN SERPL-MCNC: 4.7 G/DL (ref 3.5–5.5)
ALBUMIN/CREAT UR: <5 MG/G CREAT (ref 0–30)
ALBUMIN/GLOB SERPL: 2 {RATIO} (ref 1.2–2.2)
ALP SERPL-CCNC: 100 IU/L (ref 39–117)
ALT SERPL-CCNC: 71 IU/L (ref 0–44)
AST SERPL-CCNC: 43 IU/L (ref 0–40)
BILIRUB SERPL-MCNC: 0.5 MG/DL (ref 0–1.2)
BUN SERPL-MCNC: 12 MG/DL (ref 6–24)
BUN/CREAT SERPL: 13 (ref 9–20)
CALCIUM SERPL-MCNC: 10.1 MG/DL (ref 8.7–10.2)
CHLORIDE SERPL-SCNC: 96 MMOL/L (ref 96–106)
CHOLEST SERPL-MCNC: 125 MG/DL (ref 100–199)
CHOLEST/HDLC SERPL: 2.9 RATIO (ref 0–5)
CO2 SERPL-SCNC: 24 MMOL/L (ref 20–29)
CREAT SERPL-MCNC: 0.94 MG/DL (ref 0.76–1.27)
CREAT UR-MCNC: 59.6 MG/DL
GLOBULIN SER-MCNC: 2.4 G/DL (ref 1.5–4.5)
GLUCOSE SERPL-MCNC: 139 MG/DL (ref 65–99)
HBA1C MFR BLD: 8.4 % (ref 4.8–5.6)
HDLC SERPL-MCNC: 43 MG/DL
LABORATORY COMMENT REPORT: NORMAL
LDLC SERPL DIRECT ASSAY-MCNC: 74 MG/DL (ref 0–99)
LDLC/HDLC SERPL: 1.7 RATIO (ref 0–3.6)
MICROALBUMIN UR-MCNC: <3 UG/ML
MICRODELETION SYND BLD/T FISH: NORMAL
POTASSIUM SERPL-SCNC: 4.9 MMOL/L (ref 3.5–5.2)
PROT SERPL-MCNC: 7.1 G/DL (ref 6–8.5)
PSA SERPL-MCNC: 0.5 NG/ML (ref 0–4)
SL AMB EGFR AFRICAN AMERICAN: 103 ML/MIN/1.73
SL AMB EGFR NON AFRICAN AMERICAN: 89 ML/MIN/1.73
SODIUM SERPL-SCNC: 136 MMOL/L (ref 134–144)
TRIGL SERPL-MCNC: 80 MG/DL (ref 0–149)

## 2018-07-05 ENCOUNTER — TELEPHONE (OUTPATIENT)
Dept: FAMILY MEDICINE CLINIC | Facility: CLINIC | Age: 58
End: 2018-07-05

## 2018-07-05 DIAGNOSIS — E11.29 TYPE 2 DIABETES MELLITUS WITH OTHER DIABETIC KIDNEY COMPLICATION, WITHOUT LONG-TERM CURRENT USE OF INSULIN (HCC): Primary | ICD-10-CM

## 2018-07-05 NOTE — TELEPHONE ENCOUNTER
Please call [facility] in [place] for patient results he went last Tuesday morning and still no results     Als please call in [facility] needs lancets and test strips to [facility]

## 2018-07-05 NOTE — TELEPHONE ENCOUNTER
Looked on [employer] computer for labs  I printed and put them in your folder for review  Also sent Rx requests  Thank you   Cristina Dubon

## 2018-08-01 ENCOUNTER — TELEPHONE (OUTPATIENT)
Dept: FAMILY MEDICINE CLINIC | Facility: CLINIC | Age: 58
End: 2018-08-01

## 2018-08-01 DIAGNOSIS — E11.29 TYPE 2 DIABETES MELLITUS WITH OTHER DIABETIC KIDNEY COMPLICATION, WITHOUT LONG-TERM CURRENT USE OF INSULIN (HCC): ICD-10-CM

## 2018-09-16 PROBLEM — M17.10 OSTEOARTHRITIS OF KNEE: Status: ACTIVE | Noted: 2018-09-16

## 2018-09-16 PROBLEM — S83.209A TEAR OF MENISCUS OF KNEE: Status: ACTIVE | Noted: 2018-09-16

## 2018-09-16 PROBLEM — M19.91 LOCALIZED, PRIMARY OSTEOARTHRITIS: Status: ACTIVE | Noted: 2018-09-16

## 2018-09-16 PROBLEM — M17.9 OSTEOARTHRITIS OF KNEE: Status: ACTIVE | Noted: 2018-09-16

## 2018-09-20 ENCOUNTER — TELEPHONE (OUTPATIENT)
Dept: FAMILY MEDICINE CLINIC | Facility: CLINIC | Age: 58
End: 2018-09-20

## 2018-09-20 DIAGNOSIS — N18.1 TYPE 2 DIABETES MELLITUS WITH STAGE 1 CHRONIC KIDNEY DISEASE, WITHOUT LONG-TERM CURRENT USE OF INSULIN (HCC): Primary | ICD-10-CM

## 2018-09-20 DIAGNOSIS — E11.22 TYPE 2 DIABETES MELLITUS WITH STAGE 1 CHRONIC KIDNEY DISEASE, WITHOUT LONG-TERM CURRENT USE OF INSULIN (HCC): Primary | ICD-10-CM

## 2018-09-20 NOTE — TELEPHONE ENCOUNTER
Has an apt Oct 23 with Dr Burris Render needs blood work done before apt  Please leave up front for pickup      Kendell Mclain    Thank Deepti Fletcher

## 2018-10-15 LAB
ALBUMIN SERPL-MCNC: 4.4 G/DL (ref 3.5–5.5)
ALBUMIN/GLOB SERPL: 1.9 {RATIO} (ref 1.2–2.2)
ALP SERPL-CCNC: 82 IU/L (ref 39–117)
ALT SERPL-CCNC: 25 IU/L (ref 0–44)
AST SERPL-CCNC: 22 IU/L (ref 0–40)
BILIRUB SERPL-MCNC: 0.4 MG/DL (ref 0–1.2)
BUN SERPL-MCNC: 9 MG/DL (ref 6–24)
BUN/CREAT SERPL: 10 (ref 9–20)
CALCIUM SERPL-MCNC: 9.7 MG/DL (ref 8.7–10.2)
CHLORIDE SERPL-SCNC: 103 MMOL/L (ref 96–106)
CO2 SERPL-SCNC: 23 MMOL/L (ref 20–29)
CREAT SERPL-MCNC: 0.92 MG/DL (ref 0.76–1.27)
GLOBULIN SER-MCNC: 2.3 G/DL (ref 1.5–4.5)
GLUCOSE SERPL-MCNC: 152 MG/DL (ref 65–99)
HBA1C MFR BLD: 7 % (ref 4.8–5.6)
LABCORP COMMENT: NORMAL
POTASSIUM SERPL-SCNC: 4.3 MMOL/L (ref 3.5–5.2)
PROT SERPL-MCNC: 6.7 G/DL (ref 6–8.5)
SL AMB EGFR AFRICAN AMERICAN: 105 ML/MIN/1.73
SL AMB EGFR NON AFRICAN AMERICAN: 91 ML/MIN/1.73
SODIUM SERPL-SCNC: 140 MMOL/L (ref 134–144)

## 2018-10-23 ENCOUNTER — OFFICE VISIT (OUTPATIENT)
Dept: FAMILY MEDICINE CLINIC | Facility: CLINIC | Age: 58
End: 2018-10-23
Payer: COMMERCIAL

## 2018-10-23 VITALS
SYSTOLIC BLOOD PRESSURE: 138 MMHG | HEART RATE: 68 BPM | WEIGHT: 239 LBS | DIASTOLIC BLOOD PRESSURE: 82 MMHG | RESPIRATION RATE: 16 BRPM | BODY MASS INDEX: 31.68 KG/M2 | TEMPERATURE: 96.8 F | HEIGHT: 73 IN

## 2018-10-23 DIAGNOSIS — Z12.11 COLON CANCER SCREENING: ICD-10-CM

## 2018-10-23 DIAGNOSIS — N18.1 TYPE 2 DIABETES MELLITUS WITH STAGE 1 CHRONIC KIDNEY DISEASE, WITHOUT LONG-TERM CURRENT USE OF INSULIN (HCC): Primary | ICD-10-CM

## 2018-10-23 DIAGNOSIS — N18.1 CKD STAGE 1 DUE TO TYPE 2 DIABETES MELLITUS (HCC): ICD-10-CM

## 2018-10-23 DIAGNOSIS — E78.5 HYPERLIPIDEMIA LDL GOAL <100: ICD-10-CM

## 2018-10-23 DIAGNOSIS — Z23 IMMUNIZATION DUE: ICD-10-CM

## 2018-10-23 DIAGNOSIS — E11.22 CKD STAGE 1 DUE TO TYPE 2 DIABETES MELLITUS (HCC): ICD-10-CM

## 2018-10-23 DIAGNOSIS — E11.22 TYPE 2 DIABETES MELLITUS WITH STAGE 1 CHRONIC KIDNEY DISEASE, WITHOUT LONG-TERM CURRENT USE OF INSULIN (HCC): Primary | ICD-10-CM

## 2018-10-23 PROCEDURE — 99214 OFFICE O/P EST MOD 30 MIN: CPT | Performed by: FAMILY MEDICINE

## 2018-10-23 PROCEDURE — 90471 IMMUNIZATION ADMIN: CPT

## 2018-10-23 PROCEDURE — 90682 RIV4 VACC RECOMBINANT DNA IM: CPT

## 2018-10-23 PROCEDURE — 3008F BODY MASS INDEX DOCD: CPT | Performed by: FAMILY MEDICINE

## 2018-10-23 PROCEDURE — 1036F TOBACCO NON-USER: CPT | Performed by: FAMILY MEDICINE

## 2018-10-23 RX ORDER — GLIMEPIRIDE 2 MG/1
2 TABLET ORAL
Qty: 30 TABLET | Refills: 5 | Status: SHIPPED | OUTPATIENT
Start: 2018-10-23 | End: 2019-05-15 | Stop reason: SDUPTHER

## 2018-10-23 NOTE — PROGRESS NOTES
Assessment/Plan:    No problem-specific Assessment & Plan notes found for this encounter  Dm improving  Use of statins for the purposes of CVD/CVA risks reduction was advised according to USPSTF guidelines along with appropriate continued liver monitoring  ckd stable     Diagnoses and all orders for this visit:    Type 2 diabetes mellitus with stage 1 chronic kidney disease, without long-term current use of insulin (HCC)  -     metFORMIN (GLUCOPHAGE) 1000 MG tablet; Take 1 tablet (1,000 mg total) by mouth 2 (two) times a day with meals  -     Comprehensive metabolic panel; Future  -     Hemoglobin A1C; Future  -     glimepiride (AMARYL) 2 mg tablet; Take 1 tablet (2 mg total) by mouth daily with breakfast    Hyperlipidemia LDL goal <100    CKD stage 1 due to type 2 diabetes mellitus ([facility] [address] )    Colon cancer screening  -     Ambulatory referral to Gastroenterology; Future    Immunization due  -     influenza vaccine, [id number], quadrivalent, recombinant, PF, 0 5 mL, for patients 18 yr+ (FLUBLOK)              Return in about 3 months (around 1/23/2019) for Recheck  Subjective:      Patient ID: Harshil Tang is a 60 y o  male  Chief Complaint   Patient presents with    Follow-up     3 month f/u prcma       HPI    Doing some smbg  Rare hypoglycemia  Glipizide risks aware  Schedule hard to exercise  No myalgias  Use of statins for the purposes of CVD/CVA risks reduction was advised according to USPSTF guidelines along with appropriate continued liver monitoring  a1c improved to 7 0  The following portions of the patient's history were reviewed and updated as appropriate: allergies, current medications, past family history, past medical history, past social history, past surgical history and problem list     Review of Systems   Respiratory: Negative for shortness of breath  Cardiovascular: Negative for chest pain           Current Outpatient Prescriptions   Medication Sig Dispense Refill    glucose blood (ONETOUCH VERIO) test strip Use as instructed 1x/d [E11 20] 100 each 1    lisinopril (ZESTRIL) 10 mg tablet lisinopril 10 mg tablet      metFORMIN (GLUCOPHAGE) 1000 MG tablet Take 1 tablet (1,000 mg total) by mouth 2 (two) times a day with meals 60 tablet 5    ONE TOUCH LANCETS MISC by Does not apply route daily Use as instructed 1x/d [E11 20] 100 each 3    pravastatin (PRAVACHOL) 20 mg tablet pravastatin 20 mg tablet      glimepiride (AMARYL) 2 mg tablet Take 1 tablet (2 mg total) by mouth daily with breakfast 30 tablet 5     No current facility-administered medications for this visit  Objective:    /82   Pulse 68   Temp (!) 96 8 °F (36 °C)   Resp 16   Ht 6' 1" (1 854 m)   Wt 108 kg (239 lb)   BMI 31 53 kg/m²        Physical Exam   Constitutional: He appears well-developed  No distress  HENT:   Head: Normocephalic  Mouth/Throat: No oropharyngeal exudate  Eyes: Conjunctivae are normal  No scleral icterus  Neck: Neck supple  Cardiovascular: Normal rate and intact distal pulses  No murmur heard  Pulmonary/Chest: Effort normal  No respiratory distress  Abdominal: Soft  He exhibits no mass  There is no rebound and no guarding  Musculoskeletal: He exhibits no edema or deformity  Neurological: He is alert  Skin: Skin is warm and dry  No rash noted  Psychiatric: His behavior is normal  Thought content normal    Nursing note and vitals reviewed               Keerthi Rodríguez DO

## 2018-10-29 ENCOUNTER — OPTICAL OFFICE (OUTPATIENT)
Dept: URBAN - METROPOLITAN AREA CLINIC 146 | Facility: CLINIC | Age: 58
Setting detail: OPHTHALMOLOGY
End: 2018-10-29
Payer: COMMERCIAL

## 2018-10-29 ENCOUNTER — DOCTOR'S OFFICE (OUTPATIENT)
Dept: URBAN - METROPOLITAN AREA CLINIC 137 | Facility: CLINIC | Age: 58
Setting detail: OPHTHALMOLOGY
End: 2018-10-29
Payer: COMMERCIAL

## 2018-10-29 ENCOUNTER — RX ONLY (RX ONLY)
Age: 58
End: 2018-10-29

## 2018-10-29 DIAGNOSIS — H52.4: ICD-10-CM

## 2018-10-29 DIAGNOSIS — H52.223: ICD-10-CM

## 2018-10-29 PROCEDURE — 92015 DETERMINE REFRACTIVE STATE: CPT | Performed by: OPTOMETRIST

## 2018-10-29 PROCEDURE — 92004 COMPRE OPH EXAM NEW PT 1/>: CPT | Performed by: OPTOMETRIST

## 2018-10-29 PROCEDURE — V2744 TINT PHOTOCHROMATIC LENS/ES: HCPCS | Performed by: OPTOMETRIST

## 2018-10-29 PROCEDURE — V2025 EYEGLASSES DELUX FRAMES: HCPCS | Performed by: OPTOMETRIST

## 2018-10-29 PROCEDURE — V2020 VISION SVCS FRAMES PURCHASES: HCPCS | Performed by: OPTOMETRIST

## 2018-10-29 PROCEDURE — V2799 MISC VISION ITEM OR SERVICE: HCPCS | Performed by: OPTOMETRIST

## 2018-10-29 ASSESSMENT — REFRACTION_CURRENTRX
OD_OVR_VA: 20/
OD_OVR_VA: 20/
OS_OVR_VA: 20/
OD_OVR_VA: 20/
OS_OVR_VA: 20/
OS_OVR_VA: 20/

## 2018-10-29 ASSESSMENT — REFRACTION_MANIFEST
OS_VA1: 20/
OD_AXIS: 80
OS_VA2: 20/
OS_VA3: 20/
OS_VA2: 20/
OS_CYLINDER: -3.75
OD_VA2: 20/
OD_CYLINDER: -3.25
OD_VA3: 20/
OS_VA1: 20/20
OS_AXIS: 80
OS_SPHERE: -0.75
OU_VA: 20/
OD_ADD: +2.25
OD_VA1: 20/20
OS_VA3: 20/
OU_VA: 20/
OD_VA3: 20/
OD_VA2: 20/
OD_SPHERE: -1.00
OD_VA1: 20/

## 2018-10-29 ASSESSMENT — REFRACTION_AUTOREFRACTION
OD_CYLINDER: -3.25
OS_CYLINDER: -3.25
OD_SPHERE: -1.00
OD_AXIS: 85
OS_AXIS: 84
OS_SPHERE: -1.00

## 2018-10-29 ASSESSMENT — CONFRONTATIONAL VISUAL FIELD TEST (CVF)
OD_FINDINGS: FULL
OS_FINDINGS: FULL

## 2018-10-29 ASSESSMENT — SPHEQUIV_DERIVED
OD_SPHEQUIV: -2.625
OD_SPHEQUIV: -2.625
OS_SPHEQUIV: -2.625
OS_SPHEQUIV: -2.625

## 2018-10-29 ASSESSMENT — VISUAL ACUITY
OD_BCVA: 20/25-2
OS_BCVA: 20/25-2

## 2018-12-16 DIAGNOSIS — E11.29 TYPE 2 DIABETES MELLITUS WITH OTHER DIABETIC KIDNEY COMPLICATION, WITHOUT LONG-TERM CURRENT USE OF INSULIN (HCC): ICD-10-CM

## 2018-12-31 ENCOUNTER — DOCTOR'S OFFICE (OUTPATIENT)
Dept: URBAN - METROPOLITAN AREA CLINIC 137 | Facility: CLINIC | Age: 58
Setting detail: OPHTHALMOLOGY
End: 2018-12-31

## 2018-12-31 DIAGNOSIS — H52.223: ICD-10-CM

## 2018-12-31 PROBLEM — E11.9 DIABETES TYPE 2 NO RETINOPATHY: Status: ACTIVE | Noted: 2018-12-31

## 2018-12-31 PROBLEM — H25.13 CATARACT NUCLEAR SCLEROSIS; BOTH EYES: Status: ACTIVE | Noted: 2018-10-29

## 2018-12-31 PROCEDURE — NCRX N/C GLASSES RX: Performed by: OPTOMETRIST

## 2018-12-31 ASSESSMENT — VISUAL ACUITY
OD_BCVA: 20/40-1
OS_BCVA: 20/50-2

## 2018-12-31 ASSESSMENT — REFRACTION_MANIFEST
OD_VA2: 20/
OD_AXIS: 80
OS_CYLINDER: -3.75
OD_VA2: 20/
OS_VA3: 20/
OS_VA1: 20/
OD_SPHERE: -1.00
OD_CYLINDER: -3.25
OS_VA2: 20/
OD_VA3: 20/
OD_ADD: +2.25
OS_SPHERE: -0.75
OS_AXIS: 80
OD_VA1: 20/
OS_VA1: 20/20
OD_VA3: 20/
OU_VA: 20/
OS_VA3: 20/
OS_VA2: 20/
OU_VA: 20/
OD_VA1: 20/20

## 2018-12-31 ASSESSMENT — REFRACTION_AUTOREFRACTION
OS_CYLINDER: -3.25
OD_SPHERE: -1.00
OD_AXIS: 85
OS_SPHERE: -1.00
OD_CYLINDER: -3.25
OS_AXIS: 84

## 2018-12-31 ASSESSMENT — REFRACTION_CURRENTRX
OS_SPHERE: -0.75
OS_VPRISM_DIRECTION: BF
OD_OVR_VA: 20/
OD_ADD: +2.25
OD_VPRISM_DIRECTION: BF
OS_SPHERE: -1.00
OS_OVR_VA: 20/
OD_AXIS: 89
OD_CYLINDER: -3.25
OD_AXIS: 82
OS_VPRISM_DIRECTION: BF
OS_AXIS: 76
OD_CYLINDER: -3.25
OD_OVR_VA: 20/
OS_ADD: +2.00
OS_OVR_VA: 20/
OS_ADD: +2.25
OS_CYLINDER: -3.75
OD_VPRISM_DIRECTION: BF
OS_OVR_VA: 20/
OD_ADD: +2.00
OD_SPHERE: -1.25
OD_SPHERE: -1.00
OD_OVR_VA: 20/
OS_CYLINDER: -3.50
OS_AXIS: 86

## 2018-12-31 ASSESSMENT — SPHEQUIV_DERIVED
OD_SPHEQUIV: -2.625
OS_SPHEQUIV: -2.625
OD_SPHEQUIV: -2.625
OS_SPHEQUIV: -2.625

## 2019-01-25 ENCOUNTER — TELEPHONE (OUTPATIENT)
Dept: FAMILY MEDICINE CLINIC | Facility: CLINIC | Age: 59
End: 2019-01-25

## 2019-02-13 ENCOUNTER — OFFICE VISIT (OUTPATIENT)
Dept: FAMILY MEDICINE CLINIC | Facility: CLINIC | Age: 59
End: 2019-02-13
Payer: COMMERCIAL

## 2019-02-13 ENCOUNTER — HOSPITAL ENCOUNTER (OUTPATIENT)
Dept: RADIOLOGY | Facility: HOSPITAL | Age: 59
Discharge: HOME | End: 2019-02-13
Payer: COMMERCIAL

## 2019-02-13 ENCOUNTER — TRANSCRIBE ORDERS (OUTPATIENT)
Dept: ADMINISTRATIVE | Facility: HOSPITAL | Age: 59
End: 2019-02-13

## 2019-02-13 VITALS
HEART RATE: 76 BPM | RESPIRATION RATE: 18 BRPM | DIASTOLIC BLOOD PRESSURE: 74 MMHG | TEMPERATURE: 97.8 F | SYSTOLIC BLOOD PRESSURE: 128 MMHG | WEIGHT: 245 LBS | BODY MASS INDEX: 32.47 KG/M2 | HEIGHT: 73 IN

## 2019-02-13 DIAGNOSIS — S46.911A STRAIN OF RIGHT SHOULDER, INITIAL ENCOUNTER: ICD-10-CM

## 2019-02-13 DIAGNOSIS — E11.22 TYPE 2 DIABETES MELLITUS WITH STAGE 1 CHRONIC KIDNEY DISEASE, WITHOUT LONG-TERM CURRENT USE OF INSULIN (HCC): ICD-10-CM

## 2019-02-13 DIAGNOSIS — S46.911A STRAIN OF RIGHT SHOULDER, INITIAL ENCOUNTER: Primary | ICD-10-CM

## 2019-02-13 DIAGNOSIS — N18.1 TYPE 2 DIABETES MELLITUS WITH STAGE 1 CHRONIC KIDNEY DISEASE, WITHOUT LONG-TERM CURRENT USE OF INSULIN (HCC): ICD-10-CM

## 2019-02-13 DIAGNOSIS — E78.5 HYPERLIPIDEMIA LDL GOAL <100: ICD-10-CM

## 2019-02-13 PROBLEM — Z12.11 COLON CANCER SCREENING: Status: RESOLVED | Noted: 2018-06-20 | Resolved: 2019-02-13

## 2019-02-13 PROBLEM — Z12.5 PROSTATE CANCER SCREENING: Status: RESOLVED | Noted: 2018-06-20 | Resolved: 2019-02-13

## 2019-02-13 PROBLEM — Z23 IMMUNIZATION DUE: Status: RESOLVED | Noted: 2018-10-23 | Resolved: 2019-02-13

## 2019-02-13 PROCEDURE — 99214 OFFICE O/P EST MOD 30 MIN: CPT | Performed by: NURSE PRACTITIONER

## 2019-02-13 PROCEDURE — 73030 X-RAY EXAM OF SHOULDER: CPT

## 2019-02-13 PROCEDURE — 1036F TOBACCO NON-USER: CPT | Performed by: NURSE PRACTITIONER

## 2019-02-13 PROCEDURE — 3008F BODY MASS INDEX DOCD: CPT | Performed by: NURSE PRACTITIONER

## 2019-02-13 RX ORDER — NABUMETONE 500 MG/1
500 TABLET, FILM COATED ORAL 2 TIMES DAILY
Qty: 40 TABLET | Refills: 0 | Status: SHIPPED | OUTPATIENT
Start: 2019-02-13 | End: 2019-07-16

## 2019-02-13 RX ORDER — CYCLOBENZAPRINE HCL 10 MG
10 TABLET ORAL
Qty: 20 TABLET | Refills: 0 | Status: SHIPPED | OUTPATIENT
Start: 2019-02-13 | End: 2019-07-16

## 2019-02-13 RX ORDER — GLIMEPIRIDE 2 MG/1
1 TABLET ORAL DAILY
COMMUNITY
End: 2019-02-13 | Stop reason: ALTCHOICE

## 2019-02-13 NOTE — PROGRESS NOTES
Assessment/Plan:    Seems strain of right shoulder  Will do xray and follow up with that  Supportive care discussed and advised  Advised to apply warm moist heat  Take medication with food  Do not drive and operate any machinery after taking muscle relaxant  Supportive care discussed and advised  Follow up for no improvement and worsening of conditions  Patient advised and educated when to see immediate medical care  Diagnoses and all orders for this visit:    Strain of right shoulder, initial encounter  -     nabumetone (RELAFEN) 500 mg tablet; Take 1 tablet (500 mg total) by mouth 2 (two) times a day for 20 days  -     cyclobenzaprine (FLEXERIL) 10 mg tablet; Take 1 tablet (10 mg total) by mouth daily at bedtime for 20 days  -     XR shoulder 2+ vw right; Future    Type 2 diabetes mellitus with stage 1 chronic kidney disease, without long-term current use of insulin ([facility] )  Comments:  last HbA1c was 7 in oct, 2019  Complaint with medications  Patient is on ACE  Hyperlipidemia LDL goal <100  Comments:  on statin and tolerating it well    BMI 32 0-32 9,adult    Other orders  -     Discontinue: glimepiride (AMARYL) 2 mg tablet; Take 1 tablet by mouth Daily          BMI Counseling: Body mass index is 32 32 kg/m²  Discussed the patient's BMI with him  The BMI is above average  BMI counseling and education was provided to the patient  Exercise recommendations include exercising 3-5 times per week and strength training exercises  Patient Instructions: Take medication with food  Do not drive and operate any machinery after taking muscle relaxant  Supportive care discussed and advised  Follow up for no improvement and worsening of conditions  Patient advised and educated when to see immediate medical care  Return if symptoms worsen or fail to improve  Subjective:      Patient ID: Mateo Grey is a 60 y o  male      Chief Complaint   Patient presents with    R shoulder and arm pain  level 6-10     symptoms started yesterday  rmklpn       HPI  Patient stated that yesterday at home around 2:30 pm got up from couch and when trying to change his clothes, his right shoulder started hurting badly and pain was going down in his whole arm and feeling slightly numb in fingers  Stated that always kind of lie on his right shoulder  Went to work yesterday and has lifting work at his work  Stated that pain is about the same since yesterday  Took advil for pain  Complaint with his BP medications and diabetes medications  Stated that usually eats dinner at 9 pm and checking his BS around 1am and between 90 to 108  Denies any chest pain and sob  The following portions of the patient's history were reviewed and updated as appropriate: allergies, current medications, past family history, past medical history, past social history, past surgical history and problem list       Review of Systems   Constitutional: Negative  HENT: Negative  Respiratory: Negative  Cardiovascular: Negative  Gastrointestinal: Negative  Genitourinary: Negative  Musculoskeletal: Positive for arthralgias  Negative for back pain, gait problem, joint swelling, myalgias, neck pain and neck stiffness  Skin: Negative  Neurological: Negative  Psychiatric/Behavioral: Negative            Objective:    Social History     Tobacco Use   Smoking Status Never Smoker   Smokeless Tobacco Never Used       Allergies: No Known Allergies    Vitals:  /74   Pulse 76   Temp 97 8 °F (36 6 °C)   Resp 18   Ht 6' 1" (1 854 m)   Wt 111 kg (245 lb)   BMI 32 32 kg/m²     Current Outpatient Medications   Medication Sig Dispense Refill    glimepiride (AMARYL) 2 mg tablet Take 1 tablet (2 mg total) by mouth daily with breakfast 30 tablet 5    glucose blood (ONETOUCH VERIO) test strip Use as instructed 1x/d [E11 20] 100 each 1    lisinopril (ZESTRIL) 10 mg tablet lisinopril 10 mg tablet      metFORMIN (GLUCOPHAGE) 1000 MG tablet TAKE ONE TABLET BY MOUTH TWICE A DAY WITH MEALS 60 tablet 3    ONE TOUCH LANCETS MISC by Does not apply route daily Use as instructed 1x/d [E11 20] 100 each 3    pravastatin (PRAVACHOL) 20 mg tablet pravastatin 20 mg tablet      cyclobenzaprine (FLEXERIL) 10 mg tablet Take 1 tablet (10 mg total) by mouth daily at bedtime for 20 days 20 tablet 0    nabumetone (RELAFEN) 500 mg tablet Take 1 tablet (500 mg total) by mouth 2 (two) times a day for 20 days 40 tablet 0     No current facility-administered medications for this visit  Physical Exam   Constitutional: He is oriented to person, place, and time  He appears well-developed and well-nourished  HENT:   Head: Normocephalic  Right Ear: External ear normal    Left Ear: External ear normal    Nose: Nose normal    Eyes: Conjunctivae are normal    Cardiovascular: Normal rate, regular rhythm and normal heart sounds  Pulmonary/Chest: Effort normal and breath sounds normal    Musculoskeletal: He exhibits no edema or deformity  Right shoulder: He exhibits decreased range of motion and tenderness  He exhibits no swelling and no crepitus  Normal abduction, internal rotation, external rotation, flexion and extension of right arm but not able to raise his right arm above chin level and gets painful on attempt  Right hand grasp is strong  Sensation of right arm and hand is intact  Neurological: He is alert and oriented to person, place, and time  Coordination normal    Skin: Skin is warm and dry  No rash noted                     ROXY Jones

## 2019-02-13 NOTE — PATIENT INSTRUCTIONS
Take medication with food  Do not drive and operate any machinery after taking muscle relaxant  Supportive care discussed and advised  Follow up for no improvement and worsening of conditions  Patient advised and educated when to see immediate medical care

## 2019-02-13 NOTE — LETTER
February 13, 2019     Patient: Mateo Grey YOB: 1960   Date of Visit: 2/13/2019       To Whom it May Concern:    Roro Varela is under my professional care  He was seen in my office on 2/13/2019  He may return to work on 02/18/2019  If you have any questions or concerns, please don't hesitate to call           Sincerely,          ROXY Iniguez        CC: No Recipients

## 2019-02-20 DIAGNOSIS — E78.5 HYPERLIPIDEMIA LDL GOAL <100: Primary | ICD-10-CM

## 2019-02-20 RX ORDER — PRAVASTATIN SODIUM 20 MG
20 TABLET ORAL DAILY
Qty: 30 TABLET | Refills: 0 | Status: SHIPPED | OUTPATIENT
Start: 2019-02-20 | End: 2019-05-13 | Stop reason: SDUPTHER

## 2019-03-28 ENCOUNTER — OFFICE VISIT (OUTPATIENT)
Dept: OBGYN CLINIC | Facility: CLINIC | Age: 59
End: 2019-03-28
Payer: COMMERCIAL

## 2019-03-28 ENCOUNTER — APPOINTMENT (OUTPATIENT)
Dept: RADIOLOGY | Facility: CLINIC | Age: 59
End: 2019-03-28
Payer: COMMERCIAL

## 2019-03-28 VITALS
BODY MASS INDEX: 30.16 KG/M2 | SYSTOLIC BLOOD PRESSURE: 150 MMHG | WEIGHT: 235 LBS | DIASTOLIC BLOOD PRESSURE: 93 MMHG | HEIGHT: 74 IN | HEART RATE: 85 BPM

## 2019-03-28 DIAGNOSIS — G89.29 CHRONIC BILATERAL LOW BACK PAIN WITHOUT SCIATICA: ICD-10-CM

## 2019-03-28 DIAGNOSIS — M54.2 CHRONIC NECK PAIN: Primary | ICD-10-CM

## 2019-03-28 DIAGNOSIS — G89.29 CHRONIC NECK PAIN: Primary | ICD-10-CM

## 2019-03-28 DIAGNOSIS — M54.50 CHRONIC BILATERAL LOW BACK PAIN WITHOUT SCIATICA: ICD-10-CM

## 2019-03-28 DIAGNOSIS — M47.812 SPONDYLOSIS OF CERVICAL REGION WITHOUT MYELOPATHY OR RADICULOPATHY: ICD-10-CM

## 2019-03-28 PROCEDURE — 99203 OFFICE O/P NEW LOW 30 MIN: CPT | Performed by: ORTHOPAEDIC SURGERY

## 2019-03-28 PROCEDURE — 72050 X-RAY EXAM NECK SPINE 4/5VWS: CPT

## 2019-03-28 NOTE — PROGRESS NOTES
Assessment/Plan:    Diagnoses and all orders for this visit:    Chronic neck pain  -     XR spine cervical complete 4 or 5 vw non injury; Future    Spondylosis of cervical region without myelopathy or radiculopathy  -     Ambulatory referral to Physical Therapy; Future      · X-rays of the cervical spine were performed today and showed multi-level degenerative changes, especially in the C5-C6 and C6-C7 region  · We advised a trial with conservative management and a formal course of physical therapy  · Jose Bob will follow up with us in 6-8 weeks at which time he will be re-evaluated and will consider referral to pain management vs MRI imaging for further evaluation if no significant relief in symptoms  · Jose Bob acknowledged understanding and agreement with the plan    Chief Complaint:  Neck Pain    Subjective:     HPI    Jose Bob is a 50-year-old male that comes to the office today for evaluation of cervical neck pain  He states that he began experiencing pain in his neck in the cervical region approximately a few months ago without any inciting event or trauma  Currently is experiencing constant pain of his cervical neck in the C6 and C7 region, dull and sharp in quality, 7/10 intensity, nonradiating  He has been taking ibuprofen regularly for the pain without any significant relief  He also has been to chiropractic sessions without significant relief  Pain is exacerbated with movements of his neck, especially to the right side  He has not performed any therapy of had any injections of his neck in the past  He denies symptoms of numbness, tingling or weakness of his upper extremities bilaterally  Review of Systems   Constitutional: Negative for chills and fever  HENT: Negative for congestion, rhinorrhea and sore throat  Eyes: Negative for visual disturbance  Respiratory: Negative for shortness of breath  Cardiovascular: Negative for chest pain  Gastrointestinal: Negative for abdominal pain  Musculoskeletal: Positive for arthralgias (As per HPI)  Skin: Negative for rash  Objective:    Vitals:    03/28/19 0815   BP: 150/93   Pulse: 85       Physical Exam   Constitutional: He is oriented to person, place, and time  He appears well-developed and well-nourished  No distress  HENT:   Head: Normocephalic and atraumatic  Right Ear: External ear normal    Left Ear: External ear normal    Nose: Nose normal    Eyes: EOM are normal  No scleral icterus  Neck: Neck supple  Pulmonary/Chest: Effort normal  No respiratory distress  Abdominal: Soft  Musculoskeletal:   Pain with neck rotation to the right and side bending to the right with limited ROM  Cervical extension of approximately 10 degrees  Normal neck flexion, left rotation, left side bending ROM  No midline cervical tenderness or para-cervical tenderness  Mild left sided trapezius muscle spasm and tenderness   Neurological: He is alert and oriented to person, place, and time  Spurling test positive on the left, negative on the right  5/5 strength with upper extremities bilaterally  5/5  strength bilaterally  Sensation to light touch intact and equal bilaterally  Normal OK sign  Normal resisted finger adduction   Skin: Skin is warm  He is not diaphoretic  Psychiatric: He has a normal mood and affect  Ortho Exam    I have personally reviewed pertinent films in PACS  Four view x-ray of the cervical spine shows multi-level degenerative changes with moderate-severe joint space narrowing in the C5-C6 and C6-C7 region  No acute osseous abnormality noted

## 2019-04-08 ENCOUNTER — EVALUATION (OUTPATIENT)
Dept: PHYSICAL THERAPY | Facility: CLINIC | Age: 59
End: 2019-04-08
Payer: COMMERCIAL

## 2019-04-08 DIAGNOSIS — M47.812 CERVICAL SPONDYLOSIS WITHOUT MYELOPATHY: Primary | ICD-10-CM

## 2019-04-08 PROCEDURE — G8982 BODY POS GOAL STATUS: HCPCS

## 2019-04-08 PROCEDURE — 97162 PT EVAL MOD COMPLEX 30 MIN: CPT

## 2019-04-08 PROCEDURE — G8981 BODY POS CURRENT STATUS: HCPCS

## 2019-04-15 ENCOUNTER — OFFICE VISIT (OUTPATIENT)
Dept: PHYSICAL THERAPY | Facility: CLINIC | Age: 59
End: 2019-04-15
Payer: COMMERCIAL

## 2019-04-15 DIAGNOSIS — M47.812 CERVICAL SPONDYLOSIS WITHOUT MYELOPATHY: Primary | ICD-10-CM

## 2019-04-15 PROCEDURE — 97112 NEUROMUSCULAR REEDUCATION: CPT

## 2019-04-15 PROCEDURE — 97110 THERAPEUTIC EXERCISES: CPT

## 2019-04-15 PROCEDURE — 97140 MANUAL THERAPY 1/> REGIONS: CPT

## 2019-04-15 PROCEDURE — G8983 BODY POS D/C STATUS: HCPCS

## 2019-04-15 PROCEDURE — G8982 BODY POS GOAL STATUS: HCPCS

## 2019-04-29 RX ORDER — LISINOPRIL 10 MG/1
TABLET ORAL
OUTPATIENT
Start: 2019-04-29

## 2019-05-12 LAB
ALBUMIN SERPL-MCNC: 4.4 G/DL (ref 3.5–5.5)
ALBUMIN/GLOB SERPL: 1.8 {RATIO} (ref 1.2–2.2)
ALP SERPL-CCNC: 81 IU/L (ref 39–117)
ALT SERPL-CCNC: 34 IU/L (ref 0–44)
AST SERPL-CCNC: 25 IU/L (ref 0–40)
BILIRUB SERPL-MCNC: 0.6 MG/DL (ref 0–1.2)
BUN SERPL-MCNC: 14 MG/DL (ref 6–24)
BUN/CREAT SERPL: 15 (ref 9–20)
CALCIUM SERPL-MCNC: 10 MG/DL (ref 8.7–10.2)
CHLORIDE SERPL-SCNC: 104 MMOL/L (ref 96–106)
CO2 SERPL-SCNC: 22 MMOL/L (ref 20–29)
CREAT SERPL-MCNC: 0.91 MG/DL (ref 0.76–1.27)
GLOBULIN SER-MCNC: 2.4 G/DL (ref 1.5–4.5)
GLUCOSE SERPL-MCNC: 128 MG/DL (ref 65–99)
HBA1C MFR BLD: 7.8 % (ref 4.8–5.6)
LABCORP COMMENT: NORMAL
POTASSIUM SERPL-SCNC: 4.1 MMOL/L (ref 3.5–5.2)
PROT SERPL-MCNC: 6.8 G/DL (ref 6–8.5)
SL AMB EGFR AFRICAN AMERICAN: 106 ML/MIN/1.73
SL AMB EGFR NON AFRICAN AMERICAN: 92 ML/MIN/1.73
SODIUM SERPL-SCNC: 140 MMOL/L (ref 134–144)

## 2019-05-12 RX ORDER — TRAMADOL HYDROCHLORIDE 50 MG/1
TABLET ORAL
COMMUNITY
End: 2019-05-15 | Stop reason: ALTCHOICE

## 2019-05-12 RX ORDER — AZITHROMYCIN 250 MG/1
TABLET, FILM COATED ORAL
COMMUNITY
End: 2019-05-15 | Stop reason: ALTCHOICE

## 2019-05-12 RX ORDER — METHOCARBAMOL 750 MG/1
TABLET, FILM COATED ORAL
COMMUNITY
End: 2019-05-15 | Stop reason: ALTCHOICE

## 2019-05-12 RX ORDER — BENZONATATE 200 MG/1
CAPSULE ORAL
COMMUNITY
End: 2019-05-15 | Stop reason: ALTCHOICE

## 2019-05-12 RX ORDER — DOXYCYCLINE HYCLATE 100 MG
TABLET ORAL
COMMUNITY
End: 2019-05-15 | Stop reason: ALTCHOICE

## 2019-05-12 RX ORDER — HYDROCODONE BITARTRATE AND ACETAMINOPHEN 5; 300 MG/1; MG/1
TABLET ORAL
COMMUNITY
End: 2019-05-15 | Stop reason: ALTCHOICE

## 2019-05-12 RX ORDER — CHLORHEXIDINE GLUCONATE 4 G/100ML
SOLUTION TOPICAL
COMMUNITY
End: 2019-05-15 | Stop reason: ALTCHOICE

## 2019-05-12 RX ORDER — FUROSEMIDE 20 MG/1
TABLET ORAL
COMMUNITY
End: 2019-05-15 | Stop reason: ALTCHOICE

## 2019-05-12 RX ORDER — CELECOXIB 200 MG/1
CAPSULE ORAL
COMMUNITY
End: 2019-05-15 | Stop reason: ALTCHOICE

## 2019-05-12 RX ORDER — MELOXICAM 15 MG/1
TABLET ORAL
COMMUNITY
End: 2019-05-15 | Stop reason: ALTCHOICE

## 2019-05-13 DIAGNOSIS — E78.5 HYPERLIPIDEMIA LDL GOAL <100: ICD-10-CM

## 2019-05-13 RX ORDER — PRAVASTATIN SODIUM 20 MG
20 TABLET ORAL DAILY
Qty: 30 TABLET | Refills: 5 | Status: SHIPPED | OUTPATIENT
Start: 2019-05-13 | End: 2019-05-15 | Stop reason: SDUPTHER

## 2019-05-15 ENCOUNTER — OFFICE VISIT (OUTPATIENT)
Dept: FAMILY MEDICINE CLINIC | Facility: CLINIC | Age: 59
End: 2019-05-15
Payer: COMMERCIAL

## 2019-05-15 VITALS
BODY MASS INDEX: 31.18 KG/M2 | RESPIRATION RATE: 16 BRPM | HEIGHT: 74 IN | TEMPERATURE: 97.4 F | DIASTOLIC BLOOD PRESSURE: 88 MMHG | SYSTOLIC BLOOD PRESSURE: 136 MMHG | WEIGHT: 243 LBS | HEART RATE: 76 BPM

## 2019-05-15 DIAGNOSIS — Z13.83 SCREENING FOR CARDIOVASCULAR, RESPIRATORY, AND GENITOURINARY DISEASES: ICD-10-CM

## 2019-05-15 DIAGNOSIS — E78.5 HYPERLIPIDEMIA LDL GOAL <100: ICD-10-CM

## 2019-05-15 DIAGNOSIS — Z12.5 PROSTATE CANCER SCREENING: ICD-10-CM

## 2019-05-15 DIAGNOSIS — E11.29 TYPE 2 DIABETES MELLITUS WITH OTHER DIABETIC KIDNEY COMPLICATION, WITHOUT LONG-TERM CURRENT USE OF INSULIN (HCC): ICD-10-CM

## 2019-05-15 DIAGNOSIS — E11.22 TYPE 2 DIABETES MELLITUS WITH STAGE 1 CHRONIC KIDNEY DISEASE, WITHOUT LONG-TERM CURRENT USE OF INSULIN (HCC): Primary | ICD-10-CM

## 2019-05-15 DIAGNOSIS — Z13.6 SCREENING FOR CARDIOVASCULAR, RESPIRATORY, AND GENITOURINARY DISEASES: ICD-10-CM

## 2019-05-15 DIAGNOSIS — E11.22 CKD STAGE 1 DUE TO TYPE 2 DIABETES MELLITUS (HCC): ICD-10-CM

## 2019-05-15 DIAGNOSIS — Z12.11 COLON CANCER SCREENING: ICD-10-CM

## 2019-05-15 DIAGNOSIS — Z13.89 SCREENING FOR CARDIOVASCULAR, RESPIRATORY, AND GENITOURINARY DISEASES: ICD-10-CM

## 2019-05-15 DIAGNOSIS — N18.1 TYPE 2 DIABETES MELLITUS WITH STAGE 1 CHRONIC KIDNEY DISEASE, WITHOUT LONG-TERM CURRENT USE OF INSULIN (HCC): Primary | ICD-10-CM

## 2019-05-15 DIAGNOSIS — N18.1 CKD STAGE 1 DUE TO TYPE 2 DIABETES MELLITUS (HCC): ICD-10-CM

## 2019-05-15 PROCEDURE — 99214 OFFICE O/P EST MOD 30 MIN: CPT | Performed by: FAMILY MEDICINE

## 2019-05-15 PROCEDURE — 3066F NEPHROPATHY DOC TX: CPT | Performed by: FAMILY MEDICINE

## 2019-05-15 PROCEDURE — 4010F ACE/ARB THERAPY RXD/TAKEN: CPT | Performed by: FAMILY MEDICINE

## 2019-05-15 RX ORDER — PRAVASTATIN SODIUM 20 MG
20 TABLET ORAL DAILY
Qty: 90 TABLET | Refills: 1 | Status: SHIPPED | OUTPATIENT
Start: 2019-05-15 | End: 2020-04-08 | Stop reason: SDUPTHER

## 2019-05-15 RX ORDER — LISINOPRIL 10 MG/1
10 TABLET ORAL DAILY
Qty: 90 TABLET | Refills: 1 | Status: SHIPPED | OUTPATIENT
Start: 2019-05-15 | End: 2019-11-06 | Stop reason: SDUPTHER

## 2019-05-15 RX ORDER — GLIMEPIRIDE 2 MG/1
2 TABLET ORAL
Qty: 30 TABLET | Refills: 5 | Status: SHIPPED | OUTPATIENT
Start: 2019-05-15 | End: 2020-04-17 | Stop reason: SDUPTHER

## 2019-06-20 ENCOUNTER — OFFICE VISIT (OUTPATIENT)
Dept: OBGYN CLINIC | Facility: CLINIC | Age: 59
End: 2019-06-20
Payer: COMMERCIAL

## 2019-06-20 VITALS
SYSTOLIC BLOOD PRESSURE: 117 MMHG | WEIGHT: 239.2 LBS | BODY MASS INDEX: 30.7 KG/M2 | DIASTOLIC BLOOD PRESSURE: 75 MMHG | HEIGHT: 74 IN | HEART RATE: 73 BPM

## 2019-06-20 DIAGNOSIS — M47.812 SPONDYLOSIS OF CERVICAL REGION WITHOUT MYELOPATHY OR RADICULOPATHY: Primary | ICD-10-CM

## 2019-06-20 PROCEDURE — 99213 OFFICE O/P EST LOW 20 MIN: CPT | Performed by: ORTHOPAEDIC SURGERY

## 2019-07-10 ENCOUNTER — HOSPITAL ENCOUNTER (OUTPATIENT)
Dept: RADIOLOGY | Facility: HOSPITAL | Age: 59
Discharge: HOME | End: 2019-07-10
Attending: ORTHOPAEDIC SURGERY
Payer: COMMERCIAL

## 2019-07-10 DIAGNOSIS — M47.812 SPONDYLOSIS OF CERVICAL REGION WITHOUT MYELOPATHY OR RADICULOPATHY: ICD-10-CM

## 2019-07-10 PROCEDURE — 72141 MRI NECK SPINE W/O DYE: CPT

## 2019-07-11 ENCOUNTER — TELEPHONE (OUTPATIENT)
Dept: OBGYN CLINIC | Facility: CLINIC | Age: 59
End: 2019-07-11

## 2019-07-11 DIAGNOSIS — M47.812 SPONDYLOSIS OF CERVICAL REGION WITHOUT MYELOPATHY OR RADICULOPATHY: Primary | ICD-10-CM

## 2019-07-11 NOTE — TELEPHONE ENCOUNTER
----- Message from Pio Blanc DO sent at 7/11/2019 10:19 AM EDT -----  Gian Hollis had an MRI of the cervical spine showing a disc protrusion at the right C5-6 level  This is causing narrowing of the nerve at that level  I would like for him to see Dr Hussein Roberts at [facility] to discuss possible injection at this level  Referral in the chart

## 2019-07-11 NOTE — TELEPHONE ENCOUNTER
Spoke to patient and relayed message  Gave number for [facility] for patient to call and schedule appointment   Thank you

## 2019-07-16 ENCOUNTER — CONSULT (OUTPATIENT)
Dept: PAIN MEDICINE | Facility: CLINIC | Age: 59
End: 2019-07-16
Payer: COMMERCIAL

## 2019-07-16 VITALS
SYSTOLIC BLOOD PRESSURE: 168 MMHG | RESPIRATION RATE: 18 BRPM | HEART RATE: 66 BPM | DIASTOLIC BLOOD PRESSURE: 110 MMHG | HEIGHT: 74 IN | WEIGHT: 238 LBS | BODY MASS INDEX: 30.54 KG/M2

## 2019-07-16 DIAGNOSIS — M47.812 SPONDYLOSIS OF CERVICAL REGION WITHOUT MYELOPATHY OR RADICULOPATHY: ICD-10-CM

## 2019-07-16 DIAGNOSIS — M54.2 NECK PAIN: ICD-10-CM

## 2019-07-16 DIAGNOSIS — G89.4 CHRONIC PAIN SYNDROME: Primary | ICD-10-CM

## 2019-07-16 PROCEDURE — 99204 OFFICE O/P NEW MOD 45 MIN: CPT | Performed by: ANESTHESIOLOGY

## 2019-07-16 NOTE — H&P (VIEW-ONLY)
Assessment:  1  Chronic pain syndrome    2  Neck pain    3  Spondylosis of cervical region without myelopathy or radiculopathy        Plan:  My impressions and treatment recommendations were discussed in detail with the patient, who verbalized understanding and had no further questions  The patient is complaining primarily of neck pain  He is having difficulty with neck extension and cervical facet loading is positive bilaterally  Given that the patient has signs and symptoms consistent with bilateral cervical facet syndrome in the context of bilateral cervical facet spondylosis, discussed the rationale of undergoing bilateral C3, C4, C5, and C6 diagnostic and confirmatory medial branch blocks  The procedures, its risks, and benefits were explained in detail to the patient  Risks include but are not limited to bleeding, infection, hematoma formation, abscess formation, weakness, headache, failure the pain to improve, nerve irritation or damage, and potential worsening of the pain  The patient verbalized understanding and wished to proceed with the procedure  I did discuss with the patient that if he does not respond to the medial branch blocks, he may be a candidate for cervical epidural steroid injections  I will discuss this further with him in the future  Follow-up is planned in 4 weeks time or sooner as warranted  Discharge instructions were provided  I personally saw and examined the patient and I agree with the above discussed plan of care  History of Present Illness:    Celso Barry is a 60 y o  male who presents to [facility] for initial evaluation of the above stated pain complaints  The patient has a past medical and chronic pain history as outlined in the assessment section  He was referred by Dr King Cleaves  The patient reports a 6 month history of neck pain that is nonradiating in nature    He does report difficulty with extension of his neck as well as turning his neck from side to side  He describes his pain as moderate to severe and 4 to 8/10 on the verbal numerical a pain rating scale  He states that his pain is constant in nature  He reports that his pain can vary throughout the day, but is definitely worse on turning his head or extending his head  He describes his pain as sharp    He reports that lying down and relaxation decreases his pain while standing, bending, walking, and exercise increases his pain  He reports that traction therapy, exercise, and chiropractic manipulation did not provide any relief  Surgery, physical therapy, and heat/ice treatment provided moderate pain relief  He is currently using ibuprofen over-the-counter as needed for pain relief and does provide him some pain relief currently  Review of Systems:    Review of Systems   Constitutional: Negative for fever and unexpected weight change  HENT: Negative for trouble swallowing  Eyes: Negative for visual disturbance  Respiratory: Negative for shortness of breath and wheezing  Cardiovascular: Negative for chest pain and palpitations  Gastrointestinal: Negative for constipation, diarrhea, nausea and vomiting  Endocrine: Negative for cold intolerance, heat intolerance and polydipsia  Genitourinary: Negative for difficulty urinating and frequency  Musculoskeletal: Positive for arthralgias  Negative for gait problem, joint swelling and myalgias  Skin: Negative for rash  Neurological: Negative for dizziness, seizures, syncope, weakness and headaches  Hematological: Does not bruise/bleed easily  Psychiatric/Behavioral: Negative for dysphoric mood  All other systems reviewed and are negative          Patient Active Problem List   Diagnosis    Arthritis    CKD stage 1 due to type 2 diabetes mellitus ([facility] [address] [facility]    Type 2 diabetes mellitus with stage 1 chronic kidney disease, without long-term current use of insulin (HCC)    Edema extremities    Hyperlipidemia LDL goal <100    Low HDL (under 40)    Localized, primary osteoarthritis    Osteoarthritis of knee    Tear of meniscus of knee    Colon cancer screening    Chronic pain syndrome    Neck pain    Spondylosis of cervical region without myelopathy or radiculopathy       Past Medical History:   Diagnosis Date    Benign essential hypertension     LAST ASSESSED 5/1/17; RESOLVED 5/1/17    CAD (coronary artery disease)     RESOLVED 5/1/17    Controlled insulin dependent diabetes mellitus ([facility] [address] )     LAST ASSESSED 4/1/15; RESOLVED 5/2/16    Hyperlipidemia     Osteoarthritis     Type 2 diabetes mellitus ([facility] [address] )        Past Surgical History:   Procedure Laterality Date    KNEE SURGERY      REPLACEMENT TOTAL KNEE Right     WRIST SURGERY         Family History   Problem Relation Age of Onset    Cancer Family     Cancer Mother     No Known Problems Sister     No Known Problems Brother     No Known Problems Maternal Aunt     No Known Problems Maternal Uncle     No Known Problems Paternal Aunt     No Known Problems Paternal Uncle     No Known Problems Maternal Grandmother     No Known Problems Maternal Grandfather     No Known Problems Paternal Grandmother     No Known Problems Paternal Grandfather        Social History     Occupational History    Not on file   Tobacco Use    Smoking status: Never Smoker    Smokeless tobacco: Never Used   Substance and Sexual Activity    Alcohol use: No    Drug use: Never    Sexual activity: Not on file         Current Outpatient Medications:     glimepiride (AMARYL) 2 mg tablet, Take 1 tablet (2 mg total) by mouth daily with breakfast, Disp: 30 tablet, Rfl: 5    glucose blood (ONETOUCH VERIO) test strip, Use as instructed 1x/d [E11 20], Disp: 100 each, Rfl: 1    lisinopril (ZESTRIL) 10 mg tablet, Take 1 tablet (10 mg total) by mouth daily, Disp: 90 tablet, Rfl: 1    metFORMIN (GLUCOPHAGE) 1000 MG tablet, Take 1 tablet (1,000 mg total) by mouth 2 (two) times a day with meals, Disp: 60 tablet, Rfl: 5    ONE TOUCH LANCETS MISC, by Does not apply route daily Use as instructed 1x/d [E11 20], Disp: 100 each, Rfl: 3    pravastatin (PRAVACHOL) 20 mg tablet, Take 1 tablet (20 mg total) by mouth daily, Disp: 90 tablet, Rfl: 1    No Known Allergies    Physical Exam:    BP (!) 168/110 (BP Location: Left arm, Patient Position: Sitting, Cuff Size: Standard)   Pulse 66   Resp 18   Ht 6' 1 5" (1 867 m)   Wt 108 kg (238 lb)   BMI 30 97 kg/m²     Constitutional: obese  Eyes: anicteric  HEENT: grossly intact  Neck: supple, symmetric, trachea midline and no masses   Pulmonary:even and unlabored  Cardiovascular:No edema or pitting edema present  Skin:Normal without rashes or lesions and well hydrated  Psychiatric:Mood and affect appropriate  Neurologic:Cranial Nerves II-XII grossly intact  Musculoskeletal:normal     Cervical Spine Exam    Appearance:  Normal lordosis  Palpation/Tenderness:  no tenderness or spasm  Sensory:  no sensory deficits noted  Range of Motion:  Flexion:  No limitation  without pain  Extension:  Severely limited  with pain  Lateral Flexion - Left:  Severely limited  with pain  Lateral Flexion - Right:  Severely limited  with pain  Rotation - Left:  Severely limited  with pain  Rotation - Right:  Severely limited  with pain   Cervical facet loading is positive bilaterally  Motor Strength:  Left Arm Flexion  5/5  Left Arm Extension  5/5  Right Arm Flexion  5/5  Right Arm Extension  5/5  Left Wrist Flexion  5/5  Left Wrist Extension  5/5  Left Finger Abduction  5/5  Right Finger Abduction  5/5  Left Pincer Grasp  5/5  Right Pincer Grasp  5/5  Left    5/5  Right   5/5  Reflexes:  Left Biceps:  2+   Right Biceps:  2+   Left Brachioradialis:  2+   Right Brachioradialis:  2+   Left Triceps:  2+   Right Triceps:  2+   Special Tests:  Left Spurlings:  negative  Right Spurlings  negative      Imaging  No orders to display   MRI CERVICAL SPINE WITHOUT CONTRAST     INDICATION: [id number]: Spondylosis without myelopathy or radiculopathy, cervical region  Neck cracking with pain radiating into the shoulders      COMPARISON:  3/28/2019     TECHNIQUE:  Sagittal T1, sagittal T2, sagittal inversion recovery, axial T2, axial  2D merge     IMAGE QUALITY:  Diagnostic     FINDINGS:     ALIGNMENT:  Slight kyphotic angulation centered at the C5 segment with minimal grade 1 retrolisthesis of C5 on C6  Alignment is similar to the previous radiograph  No new subluxation      MARROW SIGNAL:  Scattered degenerative endplate changes  No focally suspicious marrow lesions  No bone marrow edema or compression abnormality      CERVICAL AND VISUALIZED THORACIC CORD:  Normal signal within the visualized cord      PREVERTEBRAL AND PARASPINAL SOFT TISSUES:  Normal      VISUALIZED POSTERIOR FOSSA:  The visualized posterior fossa demonstrates no abnormal signal      CERVICAL DISC SPACES:     C2-C3:  Normal      C3-C4:  There is a disc osteophyte complex with a superimposed left neural foraminal disc protrusion  There is bilateral facet hypertrophy  Moderate left neural foraminal narrowing  Mild central canal narrowing  Mild right neural foraminal narrowing      C4-C5:  There is a disc osteophyte complex with a superimposed right neural foraminal disc protrusion  There is moderate right neural foraminal narrowing  Mild central canal narrowing  Mild left neural foraminal     C5-C6:  There is a disc osteophyte complex with a superimposed right neural foraminal disc protrusion  There is moderate to severe right neural foraminal narrowing  Mild central canal narrowing  Mild left neural foraminal narrowing      C6-C7:  There is a disc osteophyte complex  Mild bilateral neural foraminal narrowing    Minimal central canal narrowing      C7-T1:  Normal      UPPER THORACIC DISC SPACES:  Normal      IMPRESSION:     Reversal of the cervical lordosis centered at the C5 segment with scattered spondylotic changes most pronounced on the right at C5-C6 where there is a neural foraminal disc protrusion

## 2019-07-16 NOTE — H&P (VIEW-ONLY)
Assessment:  1  Chronic pain syndrome    2  Neck pain    3  Spondylosis of cervical region without myelopathy or radiculopathy        Plan:  My impressions and treatment recommendations were discussed in detail with the patient, who verbalized understanding and had no further questions  The patient is complaining primarily of neck pain  He is having difficulty with neck extension and cervical facet loading is positive bilaterally  Given that the patient has signs and symptoms consistent with bilateral cervical facet syndrome in the context of bilateral cervical facet spondylosis, discussed the rationale of undergoing bilateral C3, C4, C5, and C6 diagnostic and confirmatory medial branch blocks  The procedures, its risks, and benefits were explained in detail to the patient  Risks include but are not limited to bleeding, infection, hematoma formation, abscess formation, weakness, headache, failure the pain to improve, nerve irritation or damage, and potential worsening of the pain  The patient verbalized understanding and wished to proceed with the procedure  I did discuss with the patient that if he does not respond to the medial branch blocks, he may be a candidate for cervical epidural steroid injections  I will discuss this further with him in the future  Follow-up is planned in 4 weeks time or sooner as warranted  Discharge instructions were provided  I personally saw and examined the patient and I agree with the above discussed plan of care  History of Present Illness:    Isabella Acosta is a 60 y o  male who presents to [facility] for initial evaluation of the above stated pain complaints  The patient has a past medical and chronic pain history as outlined in the assessment section  He was referred by Dr Meeta Rachel  The patient reports a 6 month history of neck pain that is nonradiating in nature    He does report difficulty with extension of his neck as well as turning his neck from side to side  He describes his pain as moderate to severe and 4 to 8/10 on the verbal numerical a pain rating scale  He states that his pain is constant in nature  He reports that his pain can vary throughout the day, but is definitely worse on turning his head or extending his head  He describes his pain as sharp    He reports that lying down and relaxation decreases his pain while standing, bending, walking, and exercise increases his pain  He reports that traction therapy, exercise, and chiropractic manipulation did not provide any relief  Surgery, physical therapy, and heat/ice treatment provided moderate pain relief  He is currently using ibuprofen over-the-counter as needed for pain relief and does provide him some pain relief currently  Review of Systems:    Review of Systems   Constitutional: Negative for fever and unexpected weight change  HENT: Negative for trouble swallowing  Eyes: Negative for visual disturbance  Respiratory: Negative for shortness of breath and wheezing  Cardiovascular: Negative for chest pain and palpitations  Gastrointestinal: Negative for constipation, diarrhea, nausea and vomiting  Endocrine: Negative for cold intolerance, heat intolerance and polydipsia  Genitourinary: Negative for difficulty urinating and frequency  Musculoskeletal: Positive for arthralgias  Negative for gait problem, joint swelling and myalgias  Skin: Negative for rash  Neurological: Negative for dizziness, seizures, syncope, weakness and headaches  Hematological: Does not bruise/bleed easily  Psychiatric/Behavioral: Negative for dysphoric mood  All other systems reviewed and are negative          Patient Active Problem List   Diagnosis    Arthritis    CKD stage 1 due to type 2 diabetes mellitus ([facility] [address] [facility]    Type 2 diabetes mellitus with stage 1 chronic kidney disease, without long-term current use of insulin (HCC)    Edema extremities    Hyperlipidemia LDL goal <100    Low HDL (under 40)    Localized, primary osteoarthritis    Osteoarthritis of knee    Tear of meniscus of knee    Colon cancer screening    Chronic pain syndrome    Neck pain    Spondylosis of cervical region without myelopathy or radiculopathy       Past Medical History:   Diagnosis Date    Benign essential hypertension     LAST ASSESSED 5/1/17; RESOLVED 5/1/17    CAD (coronary artery disease)     RESOLVED 5/1/17    Controlled insulin dependent diabetes mellitus ([facility] [address] )     LAST ASSESSED 4/1/15; RESOLVED 5/2/16    Hyperlipidemia     Osteoarthritis     Type 2 diabetes mellitus ([facility] [address] )        Past Surgical History:   Procedure Laterality Date    KNEE SURGERY      REPLACEMENT TOTAL KNEE Right     WRIST SURGERY         Family History   Problem Relation Age of Onset    Cancer Family     Cancer Mother     No Known Problems Sister     No Known Problems Brother     No Known Problems Maternal Aunt     No Known Problems Maternal Uncle     No Known Problems Paternal Aunt     No Known Problems Paternal Uncle     No Known Problems Maternal Grandmother     No Known Problems Maternal Grandfather     No Known Problems Paternal Grandmother     No Known Problems Paternal Grandfather        Social History     Occupational History    Not on file   Tobacco Use    Smoking status: Never Smoker    Smokeless tobacco: Never Used   Substance and Sexual Activity    Alcohol use: No    Drug use: Never    Sexual activity: Not on file         Current Outpatient Medications:     glimepiride (AMARYL) 2 mg tablet, Take 1 tablet (2 mg total) by mouth daily with breakfast, Disp: 30 tablet, Rfl: 5    glucose blood (ONETOUCH VERIO) test strip, Use as instructed 1x/d [E11 20], Disp: 100 each, Rfl: 1    lisinopril (ZESTRIL) 10 mg tablet, Take 1 tablet (10 mg total) by mouth daily, Disp: 90 tablet, Rfl: 1    metFORMIN (GLUCOPHAGE) 1000 MG tablet, Take 1 tablet (1,000 mg total) by mouth 2 (two) times a day with meals, Disp: 60 tablet, Rfl: 5    ONE TOUCH LANCETS MISC, by Does not apply route daily Use as instructed 1x/d [E11 20], Disp: 100 each, Rfl: 3    pravastatin (PRAVACHOL) 20 mg tablet, Take 1 tablet (20 mg total) by mouth daily, Disp: 90 tablet, Rfl: 1    No Known Allergies    Physical Exam:    BP (!) 168/110 (BP Location: Left arm, Patient Position: Sitting, Cuff Size: Standard)   Pulse 66   Resp 18   Ht 6' 1 5" (1 867 m)   Wt 108 kg (238 lb)   BMI 30 97 kg/m²     Constitutional: obese  Eyes: anicteric  HEENT: grossly intact  Neck: supple, symmetric, trachea midline and no masses   Pulmonary:even and unlabored  Cardiovascular:No edema or pitting edema present  Skin:Normal without rashes or lesions and well hydrated  Psychiatric:Mood and affect appropriate  Neurologic:Cranial Nerves II-XII grossly intact  Musculoskeletal:normal     Cervical Spine Exam    Appearance:  Normal lordosis  Palpation/Tenderness:  no tenderness or spasm  Sensory:  no sensory deficits noted  Range of Motion:  Flexion:  No limitation  without pain  Extension:  Severely limited  with pain  Lateral Flexion - Left:  Severely limited  with pain  Lateral Flexion - Right:  Severely limited  with pain  Rotation - Left:  Severely limited  with pain  Rotation - Right:  Severely limited  with pain   Cervical facet loading is positive bilaterally  Motor Strength:  Left Arm Flexion  5/5  Left Arm Extension  5/5  Right Arm Flexion  5/5  Right Arm Extension  5/5  Left Wrist Flexion  5/5  Left Wrist Extension  5/5  Left Finger Abduction  5/5  Right Finger Abduction  5/5  Left Pincer Grasp  5/5  Right Pincer Grasp  5/5  Left    5/5  Right   5/5  Reflexes:  Left Biceps:  2+   Right Biceps:  2+   Left Brachioradialis:  2+   Right Brachioradialis:  2+   Left Triceps:  2+   Right Triceps:  2+   Special Tests:  Left Spurlings:  negative  Right Spurlings  negative      Imaging  No orders to display   MRI CERVICAL SPINE WITHOUT CONTRAST     INDICATION: [id number]: Spondylosis without myelopathy or radiculopathy, cervical region  Neck cracking with pain radiating into the shoulders      COMPARISON:  3/28/2019     TECHNIQUE:  Sagittal T1, sagittal T2, sagittal inversion recovery, axial T2, axial  2D merge     IMAGE QUALITY:  Diagnostic     FINDINGS:     ALIGNMENT:  Slight kyphotic angulation centered at the C5 segment with minimal grade 1 retrolisthesis of C5 on C6  Alignment is similar to the previous radiograph  No new subluxation      MARROW SIGNAL:  Scattered degenerative endplate changes  No focally suspicious marrow lesions  No bone marrow edema or compression abnormality      CERVICAL AND VISUALIZED THORACIC CORD:  Normal signal within the visualized cord      PREVERTEBRAL AND PARASPINAL SOFT TISSUES:  Normal      VISUALIZED POSTERIOR FOSSA:  The visualized posterior fossa demonstrates no abnormal signal      CERVICAL DISC SPACES:     C2-C3:  Normal      C3-C4:  There is a disc osteophyte complex with a superimposed left neural foraminal disc protrusion  There is bilateral facet hypertrophy  Moderate left neural foraminal narrowing  Mild central canal narrowing  Mild right neural foraminal narrowing      C4-C5:  There is a disc osteophyte complex with a superimposed right neural foraminal disc protrusion  There is moderate right neural foraminal narrowing  Mild central canal narrowing  Mild left neural foraminal     C5-C6:  There is a disc osteophyte complex with a superimposed right neural foraminal disc protrusion  There is moderate to severe right neural foraminal narrowing  Mild central canal narrowing  Mild left neural foraminal narrowing      C6-C7:  There is a disc osteophyte complex  Mild bilateral neural foraminal narrowing    Minimal central canal narrowing      C7-T1:  Normal      UPPER THORACIC DISC SPACES:  Normal      IMPRESSION:     Reversal of the cervical lordosis centered at the C5 segment with scattered spondylotic changes most pronounced on the right at C5-C6 where there is a neural foraminal disc protrusion

## 2019-07-16 NOTE — PROGRESS NOTES
Assessment:  1  Chronic pain syndrome    2  Neck pain    3  Spondylosis of cervical region without myelopathy or radiculopathy        Plan:  My impressions and treatment recommendations were discussed in detail with the patient, who verbalized understanding and had no further questions  The patient is complaining primarily of neck pain  He is having difficulty with neck extension and cervical facet loading is positive bilaterally  Given that the patient has signs and symptoms consistent with bilateral cervical facet syndrome in the context of bilateral cervical facet spondylosis, discussed the rationale of undergoing bilateral C3, C4, C5, and C6 diagnostic and confirmatory medial branch blocks  The procedures, its risks, and benefits were explained in detail to the patient  Risks include but are not limited to bleeding, infection, hematoma formation, abscess formation, weakness, headache, failure the pain to improve, nerve irritation or damage, and potential worsening of the pain  The patient verbalized understanding and wished to proceed with the procedure  I did discuss with the patient that if he does not respond to the medial branch blocks, he may be a candidate for cervical epidural steroid injections  I will discuss this further with him in the future  Follow-up is planned in 4 weeks time or sooner as warranted  Discharge instructions were provided  I personally saw and examined the patient and I agree with the above discussed plan of care  History of Present Illness:    Maia Alexander is a 60 y o  male who presents to [facility] for initial evaluation of the above stated pain complaints  The patient has a past medical and chronic pain history as outlined in the assessment section  He was referred by Dr Radha Higuera  The patient reports a 6 month history of neck pain that is nonradiating in nature    He does report difficulty with extension of his neck as well as turning his neck from side to side  He describes his pain as moderate to severe and 4 to 8/10 on the verbal numerical a pain rating scale  He states that his pain is constant in nature  He reports that his pain can vary throughout the day, but is definitely worse on turning his head or extending his head  He describes his pain as sharp    He reports that lying down and relaxation decreases his pain while standing, bending, walking, and exercise increases his pain  He reports that traction therapy, exercise, and chiropractic manipulation did not provide any relief  Surgery, physical therapy, and heat/ice treatment provided moderate pain relief  He is currently using ibuprofen over-the-counter as needed for pain relief and does provide him some pain relief currently  Review of Systems:    Review of Systems   Constitutional: Negative for fever and unexpected weight change  HENT: Negative for trouble swallowing  Eyes: Negative for visual disturbance  Respiratory: Negative for shortness of breath and wheezing  Cardiovascular: Negative for chest pain and palpitations  Gastrointestinal: Negative for constipation, diarrhea, nausea and vomiting  Endocrine: Negative for cold intolerance, heat intolerance and polydipsia  Genitourinary: Negative for difficulty urinating and frequency  Musculoskeletal: Positive for arthralgias  Negative for gait problem, joint swelling and myalgias  Skin: Negative for rash  Neurological: Negative for dizziness, seizures, syncope, weakness and headaches  Hematological: Does not bruise/bleed easily  Psychiatric/Behavioral: Negative for dysphoric mood  All other systems reviewed and are negative          Patient Active Problem List   Diagnosis    Arthritis    CKD stage 1 due to type 2 diabetes mellitus ([facility]    Type 2 diabetes mellitus with stage 1 chronic kidney disease, without long-term current use of insulin (HCC)    Edema extremities    Hyperlipidemia LDL goal <100    Low HDL (under 40)    Localized, primary osteoarthritis    Osteoarthritis of knee    Tear of meniscus of knee    Colon cancer screening    Chronic pain syndrome    Neck pain    Spondylosis of cervical region without myelopathy or radiculopathy       Past Medical History:   Diagnosis Date    Benign essential hypertension     LAST ASSESSED 5/1/17; RESOLVED 5/1/17    CAD (coronary artery disease)     RESOLVED 5/1/17    Controlled insulin dependent diabetes mellitus ([facility] )     LAST ASSESSED 4/1/15; RESOLVED 5/2/16    Hyperlipidemia     Osteoarthritis     Type 2 diabetes mellitus ([facility] )        Past Surgical History:   Procedure Laterality Date    KNEE SURGERY      REPLACEMENT TOTAL KNEE Right     WRIST SURGERY         Family History   Problem Relation Age of Onset    Cancer Family     Cancer Mother     No Known Problems Sister     No Known Problems Brother     No Known Problems Maternal Aunt     No Known Problems Maternal Uncle     No Known Problems Paternal Aunt     No Known Problems Paternal Uncle     No Known Problems Maternal Grandmother     No Known Problems Maternal Grandfather     No Known Problems Paternal Grandmother     No Known Problems Paternal Grandfather        Social History     Occupational History    Not on file   Tobacco Use    Smoking status: Never Smoker    Smokeless tobacco: Never Used   Substance and Sexual Activity    Alcohol use: No    Drug use: Never    Sexual activity: Not on file         Current Outpatient Medications:     glimepiride (AMARYL) 2 mg tablet, Take 1 tablet (2 mg total) by mouth daily with breakfast, Disp: 30 tablet, Rfl: 5    glucose blood (ONETOUCH VERIO) test strip, Use as instructed 1x/d [E11 20], Disp: 100 each, Rfl: 1    lisinopril (ZESTRIL) 10 mg tablet, Take 1 tablet (10 mg total) by mouth daily, Disp: 90 tablet, Rfl: 1    metFORMIN (GLUCOPHAGE) 1000 MG tablet, Take 1 tablet (1,000 mg total) by mouth 2 (two) times a day with meals, Disp: 60 tablet, Rfl: 5    ONE TOUCH LANCETS MISC, by Does not apply route daily Use as instructed 1x/d [E11 20], Disp: 100 each, Rfl: 3    pravastatin (PRAVACHOL) 20 mg tablet, Take 1 tablet (20 mg total) by mouth daily, Disp: 90 tablet, Rfl: 1    No Known Allergies    Physical Exam:    BP (!) 168/110 (BP Location: Left arm, Patient Position: Sitting, Cuff Size: Standard)   Pulse 66   Resp 18   Ht 6' 1 5" (1 867 m)   Wt 108 kg (238 lb)   BMI 30 97 kg/m²     Constitutional: obese  Eyes: anicteric  HEENT: grossly intact  Neck: supple, symmetric, trachea midline and no masses   Pulmonary:even and unlabored  Cardiovascular:No edema or pitting edema present  Skin:Normal without rashes or lesions and well hydrated  Psychiatric:Mood and affect appropriate  Neurologic:Cranial Nerves II-XII grossly intact  Musculoskeletal:normal     Cervical Spine Exam    Appearance:  Normal lordosis  Palpation/Tenderness:  no tenderness or spasm  Sensory:  no sensory deficits noted  Range of Motion:  Flexion:  No limitation  without pain  Extension:  Severely limited  with pain  Lateral Flexion - Left:  Severely limited  with pain  Lateral Flexion - Right:  Severely limited  with pain  Rotation - Left:  Severely limited  with pain  Rotation - Right:  Severely limited  with pain   Cervical facet loading is positive bilaterally  Motor Strength:  Left Arm Flexion  5/5  Left Arm Extension  5/5  Right Arm Flexion  5/5  Right Arm Extension  5/5  Left Wrist Flexion  5/5  Left Wrist Extension  5/5  Left Finger Abduction  5/5  Right Finger Abduction  5/5  Left Pincer Grasp  5/5  Right Pincer Grasp  5/5  Left    5/5  Right   5/5  Reflexes:  Left Biceps:  2+   Right Biceps:  2+   Left Brachioradialis:  2+   Right Brachioradialis:  2+   Left Triceps:  2+   Right Triceps:  2+   Special Tests:  Left Spurlings:  negative  Right Spurlings  negative      Imaging  No orders to display   MRI CERVICAL SPINE WITHOUT CONTRAST     INDICATION: [id number]: Spondylosis without myelopathy or radiculopathy, cervical region  Neck cracking with pain radiating into the shoulders      COMPARISON:  3/28/2019     TECHNIQUE:  Sagittal T1, sagittal T2, sagittal inversion recovery, axial T2, axial  2D merge     IMAGE QUALITY:  Diagnostic     FINDINGS:     ALIGNMENT:  Slight kyphotic angulation centered at the C5 segment with minimal grade 1 retrolisthesis of C5 on C6  Alignment is similar to the previous radiograph  No new subluxation      MARROW SIGNAL:  Scattered degenerative endplate changes  No focally suspicious marrow lesions  No bone marrow edema or compression abnormality      CERVICAL AND VISUALIZED THORACIC CORD:  Normal signal within the visualized cord      PREVERTEBRAL AND PARASPINAL SOFT TISSUES:  Normal      VISUALIZED POSTERIOR FOSSA:  The visualized posterior fossa demonstrates no abnormal signal      CERVICAL DISC SPACES:     C2-C3:  Normal      C3-C4:  There is a disc osteophyte complex with a superimposed left neural foraminal disc protrusion  There is bilateral facet hypertrophy  Moderate left neural foraminal narrowing  Mild central canal narrowing  Mild right neural foraminal narrowing      C4-C5:  There is a disc osteophyte complex with a superimposed right neural foraminal disc protrusion  There is moderate right neural foraminal narrowing  Mild central canal narrowing  Mild left neural foraminal     C5-C6:  There is a disc osteophyte complex with a superimposed right neural foraminal disc protrusion  There is moderate to severe right neural foraminal narrowing  Mild central canal narrowing  Mild left neural foraminal narrowing      C6-C7:  There is a disc osteophyte complex  Mild bilateral neural foraminal narrowing    Minimal central canal narrowing      C7-T1:  Normal      UPPER THORACIC DISC SPACES:  Normal      IMPRESSION:     Reversal of the cervical lordosis centered at the C5 segment with scattered spondylotic changes most pronounced on the right at C5-C6 where there is a neural foraminal disc protrusion

## 2019-07-19 ENCOUNTER — APPOINTMENT (OUTPATIENT)
Dept: RADIOLOGY | Facility: HOSPITAL | Age: 59
End: 2019-07-19
Payer: COMMERCIAL

## 2019-07-19 ENCOUNTER — HOSPITAL ENCOUNTER (OUTPATIENT)
Facility: AMBULARY SURGERY CENTER | Age: 59
Setting detail: OUTPATIENT SURGERY
Discharge: HOME | End: 2019-07-19
Attending: ANESTHESIOLOGY | Admitting: ANESTHESIOLOGY
Payer: COMMERCIAL

## 2019-07-19 VITALS
HEART RATE: 64 BPM | HEIGHT: 74 IN | OXYGEN SATURATION: 96 % | WEIGHT: 238 LBS | RESPIRATION RATE: 14 BRPM | BODY MASS INDEX: 30.54 KG/M2 | SYSTOLIC BLOOD PRESSURE: 134 MMHG | DIASTOLIC BLOOD PRESSURE: 86 MMHG | TEMPERATURE: 96.7 F

## 2019-07-19 LAB — GLUCOSE SERPL-MCNC: 140 MG/DL (ref 65–140)

## 2019-07-19 PROCEDURE — NC001 PR NO CHARGE: Performed by: ANESTHESIOLOGY

## 2019-07-19 PROCEDURE — 64490 INJ PARAVERT F JNT C/T 1 LEV: CPT | Performed by: ANESTHESIOLOGY

## 2019-07-19 PROCEDURE — 64491 INJ PARAVERT F JNT C/T 2 LEV: CPT | Performed by: ANESTHESIOLOGY

## 2019-07-19 PROCEDURE — 82948 REAGENT STRIP/BLOOD GLUCOSE: CPT

## 2019-07-19 PROCEDURE — 64492 INJ PARAVERT F JNT C/T 3 LEV: CPT | Performed by: ANESTHESIOLOGY

## 2019-07-19 PROCEDURE — 72020 X-RAY EXAM OF SPINE 1 VIEW: CPT

## 2019-07-19 RX ORDER — LIDOCAINE HYDROCHLORIDE 10 MG/ML
INJECTION, SOLUTION EPIDURAL; INFILTRATION; INTRACAUDAL; PERINEURAL AS NEEDED
Status: DISCONTINUED | OUTPATIENT
Start: 2019-07-19 | End: 2019-07-19 | Stop reason: HOSPADM

## 2019-07-19 RX ORDER — LIDOCAINE WITH 8.4% SOD BICARB 0.9%(10ML)
SYRINGE (ML) INJECTION AS NEEDED
Status: DISCONTINUED | OUTPATIENT
Start: 2019-07-19 | End: 2019-07-19 | Stop reason: HOSPADM

## 2019-07-19 NOTE — OP NOTE
ATTENDING PHYSICIAN:  Toñito Combs MD     PRE-PROCEDURE DIAGNOSIS:  Cervical facet arthropathy  POST-PROCEDURE DIAGNOSIS:  Cervical facet arthropathy  PROCEDURE: Bilateral cervical diagnostic medial branch blocks at the C3, C4, C5, and C6 levels  ESTIMATED BLOOD LOSS: None  COMPLICATIONS: None  ANESTHESIA: Local     LOCATION:  [facility]  CONSENT: The risks, benefits, as well as alternatives were explained to the patient  Risks include but are not limited to bleeding, infection, nerve irritation or damage, hematoma formation, abscess formation, failure the pain to improve, or potential worsening of the pain  The patient verbalized understanding and wished to proceed with the procedure  Written informed consent was thereby obtained  DESCRIPTION OF PROCEDURE: After written informed consent was obtained, the patient was taken to the fluoroscopy suite and placed in the supine position  The patient's cervical region was prepped and draped in the usual sterile fashion using antiseptic swabs  Subsequently, the skin and subcutaneous tissues at each needle entry site were infiltrated with a 25-gauge 1-1/2-inch needle with approximately a total of 3 mL of 1% preservative-free lidocaine  25-gauge 3-1/2-inch needles were advanced under fluoroscopic guidance from the lateral view such that the needle tips were positioned on os at the center of the cuboid masses of the posterior columns of C3, C4, C5, and C6 levels  Needle placement was confirmed via the aid of fluoroscopy  At each level, following negative aspiration, 0 5 mL of preservative-free 1% Lidocaine was injected slowly  All needles were removed intact and hemostasis was maintained throughout  The patient tolerated the procedure well and no paresthesias or other complications were reported during or following this procedure   The patient's cervical region was then cleaned of antiseptic and Band-Aids were placed as appropriate  The patient was transferred to the recovery area and was observed for an appropriate period of time during which the patient remained hemodynamically stable and neurovascularly intact as the patient was prior to the procedure  The patient was subsequently discharged home in stable condition with supervision  I was present for and participated in all key and critical portions of this procedure      Camilo Fernandez MD  7/19/2019  10:45 AM

## 2019-07-19 NOTE — DISCHARGE INSTRUCTIONS

## 2019-07-22 ENCOUNTER — TELEPHONE (OUTPATIENT)
Dept: PAIN MEDICINE | Facility: CLINIC | Age: 59
End: 2019-07-22

## 2019-07-22 NOTE — TELEPHONE ENCOUNTER
Patient drop off pain diary at  [facility] scanned in chart gave to Dr Trupti Bryan to review

## 2019-07-25 ENCOUNTER — TELEPHONE (OUTPATIENT)
Dept: PAIN MEDICINE | Facility: MEDICAL CENTER | Age: 59
End: 2019-07-25

## 2019-07-25 NOTE — TELEPHONE ENCOUNTER
Pt and wife called stating that since his injection last Friday, he has been having increased pain in his neck and top of shoulders where it connects to the neck  Pt is unable to sleep comfortably,  Pain in morning and night is at a 10/10  Currently pt is taking advil only  Pt would like a callback to see if some relief can be found     Pt can be reached at [phone]  Pt's wife can be reached at [phone]

## 2019-07-25 NOTE — TELEPHONE ENCOUNTER
Pt would like a doctors not for not going into work today and would like to know if Allen would write one    Pt can be reached at [phone]

## 2019-07-25 NOTE — TELEPHONE ENCOUNTER
S/W pt who states since Cervical MBB on last Friday, has been having increasing pain on right side of neck, just where it meets his shoulder  States during injection, the technician held his arms back for the last area, he's not sure if that has something to do with it  States he was ok most of the day Friday but works second shift and started feeling it around 2230  Now, when he puts his head back  , or turns in bed, he feels sharp pain on the right side  Has been taking Advil  Denies fever or signs of infection  Area slightly sore to touch and some bruising  Advised pt to continue Advil if helpful  Advised Ice 20 min on 20 min off  Pt states wife picked up some Biofreeze, advised to try this as well  Scheduled for 2nd MBB on 8/2/19, pt states not looking forward to it  Please advise any other recommendations

## 2019-07-25 NOTE — TELEPHONE ENCOUNTER
I don't think it was the technician pulling down his arm during the procedure  That was only about 1 min of holding it  It is most likely related to arthritis in his neck  We should proceed to MBB #2 to see if it goes away with the second set of injections

## 2019-08-02 ENCOUNTER — HOSPITAL ENCOUNTER (OUTPATIENT)
Facility: AMBULARY SURGERY CENTER | Age: 59
Setting detail: OUTPATIENT SURGERY
Discharge: HOME | End: 2019-08-02
Attending: ANESTHESIOLOGY | Admitting: ANESTHESIOLOGY
Payer: COMMERCIAL

## 2019-08-02 ENCOUNTER — APPOINTMENT (OUTPATIENT)
Dept: RADIOLOGY | Facility: HOSPITAL | Age: 59
End: 2019-08-02
Payer: COMMERCIAL

## 2019-08-02 VITALS
HEART RATE: 94 BPM | BODY MASS INDEX: 30.54 KG/M2 | WEIGHT: 238 LBS | DIASTOLIC BLOOD PRESSURE: 62 MMHG | OXYGEN SATURATION: 96 % | TEMPERATURE: 96.5 F | HEIGHT: 74 IN | SYSTOLIC BLOOD PRESSURE: 158 MMHG | RESPIRATION RATE: 18 BRPM

## 2019-08-02 DIAGNOSIS — E11.29 TYPE 2 DIABETES MELLITUS WITH OTHER DIABETIC KIDNEY COMPLICATION, WITHOUT LONG-TERM CURRENT USE OF INSULIN (HCC): ICD-10-CM

## 2019-08-02 LAB — GLUCOSE SERPL-MCNC: 109 MG/DL (ref 65–140)

## 2019-08-02 PROCEDURE — 72020 X-RAY EXAM OF SPINE 1 VIEW: CPT

## 2019-08-02 PROCEDURE — 64492 INJ PARAVERT F JNT C/T 3 LEV: CPT | Performed by: ANESTHESIOLOGY

## 2019-08-02 PROCEDURE — 64491 INJ PARAVERT F JNT C/T 2 LEV: CPT | Performed by: ANESTHESIOLOGY

## 2019-08-02 PROCEDURE — 64490 INJ PARAVERT F JNT C/T 1 LEV: CPT | Performed by: ANESTHESIOLOGY

## 2019-08-02 PROCEDURE — 82948 REAGENT STRIP/BLOOD GLUCOSE: CPT

## 2019-08-02 RX ORDER — LIDOCAINE WITH 8.4% SOD BICARB 0.9%(10ML)
SYRINGE (ML) INJECTION AS NEEDED
Status: DISCONTINUED | OUTPATIENT
Start: 2019-08-02 | End: 2019-08-02 | Stop reason: HOSPADM

## 2019-08-02 RX ORDER — BUPIVACAINE HYDROCHLORIDE 2.5 MG/ML
INJECTION, SOLUTION EPIDURAL; INFILTRATION; INTRACAUDAL AS NEEDED
Status: DISCONTINUED | OUTPATIENT
Start: 2019-08-02 | End: 2019-08-02 | Stop reason: HOSPADM

## 2019-08-02 NOTE — DISCHARGE INSTRUCTIONS

## 2019-08-02 NOTE — OP NOTE
ATTENDING PHYSICIAN:  Angella Clarke MD     PRE-PROCEDURE DIAGNOSIS:  Cervical facet arthropathy  POST-PROCEDURE DIAGNOSIS:  Cervical facet arthropathy  PROCEDURE: Bilateral cervical diagnostic medial branch blocks at the C3, C4, C5, and C6 levels  ESTIMATED BLOOD LOSS: None  COMPLICATIONS: None  ANESTHESIA: Local     LOCATION:  [address]  CONSENT: The risks, benefits, as well as alternatives were explained to the patient  Risks include but are not limited to bleeding, infection, nerve irritation or damage, hematoma formation, abscess formation, failure the pain to improve, or potential worsening of the pain  The patient verbalized understanding and wished to proceed with the procedure  Written informed consent was thereby obtained  DESCRIPTION OF PROCEDURE: After written informed consent was obtained, the patient was taken to the fluoroscopy suite and placed in the supine position  The patient's cervical region was prepped and draped in the usual sterile fashion using antiseptic swabs  Subsequently, the skin and subcutaneous tissues at each needle entry site were infiltrated with a 25-gauge 1-1/2-inch needle with approximately a total of 3 mL of 1% preservative-free lidocaine  25-gauge 3-1/2-inch needles were advanced under fluoroscopic guidance from the lateral view such that the needle tips were positioned on os at the center of the cuboid masses of the posterior columns of C3, C4, C5, and C6 levels  Needle placement was confirmed via the aid of fluoroscopy  At each level, following negative aspiration, 0 5 mL of preservative-free 0 25% Bupivicaine was injected slowly  All needles were removed intact and hemostasis was maintained throughout  The patient tolerated the procedure well and no paresthesias or other complications were reported during or following this procedure   The patient's cervical region was then cleaned of antiseptic and Band-Aids were placed as appropriate  The patient was transferred to the recovery area and was observed for an appropriate period of time during which the patient remained hemodynamically stable and neurovascularly intact as the patient was prior to the procedure  The patient was subsequently discharged home in stable condition with supervision  I was present for and participated in all key and critical portions of this procedure      Heather Lantigua MD  8/2/2019  8:45 AM

## 2019-08-05 ENCOUNTER — TELEPHONE (OUTPATIENT)
Dept: PAIN MEDICINE | Facility: CLINIC | Age: 59
End: 2019-08-05

## 2019-08-05 NOTE — TELEPHONE ENCOUNTER
Scheduled pt for Rt C3-C6 Rfa for 8/15/19 & Lt C3-C6 RFA 8/30/19   Went over pre-procedure instructions below:  Nothing to eat or drink 1 hr prior to procedure  Need to arrange transportation  Proper clothing for procedure  If ill or placed on antibiotics please call to reschedule  FU Scheduled

## 2019-08-16 ENCOUNTER — APPOINTMENT (OUTPATIENT)
Dept: RADIOLOGY | Facility: HOSPITAL | Age: 59
End: 2019-08-16
Payer: COMMERCIAL

## 2019-08-16 ENCOUNTER — HOSPITAL ENCOUNTER (OUTPATIENT)
Facility: AMBULARY SURGERY CENTER | Age: 59
Setting detail: OUTPATIENT SURGERY
Discharge: HOME | End: 2019-08-16
Attending: ANESTHESIOLOGY | Admitting: ANESTHESIOLOGY
Payer: COMMERCIAL

## 2019-08-16 VITALS
RESPIRATION RATE: 18 BRPM | TEMPERATURE: 96.8 F | OXYGEN SATURATION: 98 % | DIASTOLIC BLOOD PRESSURE: 87 MMHG | HEART RATE: 70 BPM | SYSTOLIC BLOOD PRESSURE: 157 MMHG

## 2019-08-16 DIAGNOSIS — G89.4 CHRONIC PAIN SYNDROME: ICD-10-CM

## 2019-08-16 PROCEDURE — 64633 DESTROY CERV/THOR FACET JNT: CPT | Performed by: ANESTHESIOLOGY

## 2019-08-16 PROCEDURE — 72020 X-RAY EXAM OF SPINE 1 VIEW: CPT

## 2019-08-16 PROCEDURE — 64634 DESTROY C/TH FACET JNT ADDL: CPT | Performed by: ANESTHESIOLOGY

## 2019-08-16 RX ORDER — LIDOCAINE WITH 8.4% SOD BICARB 0.9%(10ML)
SYRINGE (ML) INJECTION AS NEEDED
Status: DISCONTINUED | OUTPATIENT
Start: 2019-08-16 | End: 2019-08-16 | Stop reason: HOSPADM

## 2019-08-16 RX ORDER — LIDOCAINE HYDROCHLORIDE 20 MG/ML
INJECTION, SOLUTION EPIDURAL; INFILTRATION; INTRACAUDAL; PERINEURAL AS NEEDED
Status: DISCONTINUED | OUTPATIENT
Start: 2019-08-16 | End: 2019-08-16 | Stop reason: HOSPADM

## 2019-08-16 NOTE — H&P (VIEW-ONLY)
History of Present Illness: The patient is a 60 y o  male who presents with complaints of neck pain    Patient Active Problem List   Diagnosis    Arthritis    CKD stage 1 due to type 2 diabetes mellitus ([facility] )    Type 2 diabetes mellitus with stage 1 chronic kidney disease, without long-term current use of insulin (HCC)    Edema extremities    Hyperlipidemia LDL goal <100    Low HDL (under 40)    Localized, primary osteoarthritis    Osteoarthritis of knee    Tear of meniscus of knee    Colon cancer screening    Chronic pain syndrome    Neck pain    Spondylosis of cervical region without myelopathy or radiculopathy    Cervical spondylosis without myelopathy    Cervicalgia       Past Medical History:   Diagnosis Date    Benign essential hypertension     LAST ASSESSED 5/1/17; RESOLVED 5/1/17    CAD (coronary artery disease)     RESOLVED 5/1/17    Controlled insulin dependent diabetes mellitus ([facility] )     LAST ASSESSED 4/1/15; RESOLVED 5/2/16    Hyperlipidemia     Osteoarthritis     Type 2 diabetes mellitus ([facility] )        Past Surgical History:   Procedure Laterality Date    KNEE SURGERY      NERVE BLOCK Bilateral 7/19/2019    Procedure: C3 C4 C5 C6 Medial Branch Block #1 ([id number]); Surgeon: Suri Shukla MD;  [facility] MAIN OR;  Service: Pain Management     NERVE BLOCK Bilateral 8/2/2019    Procedure: C3 C4 C5 C6 Medial Branch Block #2 ([id number]); Surgeon: Suri Shukla MD;  Location: [facility] OR;  Service: Pain Management     REPLACEMENT TOTAL KNEE Right     WRIST SURGERY         No current facility-administered medications for this encounter  No Known Allergies    Physical Exam: There were no vitals filed for this visit    General: Awake, Alert, Oriented x 3, Mood and affect appropriate  Respiratory: Respirations even and unlabored  Cardiovascular: Peripheral pulses intact; no edema  Musculoskeletal Exam:  Cervical facet loading is positive    ASA Score:  2 Assessment:  Cervical facet syndrome    Plan:  Proceed with C3, C4, C5, and C6 cervical facet radiofrequency ablation

## 2019-08-16 NOTE — H&P
History of Present Illness: The patient is a 60 y o  male who presents with complaints of neck pain    Patient Active Problem List   Diagnosis    Arthritis    CKD stage 1 due to type 2 diabetes mellitus ([facility] )    Type 2 diabetes mellitus with stage 1 chronic kidney disease, without long-term current use of insulin (HCC)    Edema extremities    Hyperlipidemia LDL goal <100    Low HDL (under 40)    Localized, primary osteoarthritis    Osteoarthritis of knee    Tear of meniscus of knee    Colon cancer screening    Chronic pain syndrome    Neck pain    Spondylosis of cervical region without myelopathy or radiculopathy    Cervical spondylosis without myelopathy    Cervicalgia       Past Medical History:   Diagnosis Date    Benign essential hypertension     LAST ASSESSED 5/1/17; RESOLVED 5/1/17    CAD (coronary artery disease)     RESOLVED 5/1/17    Controlled insulin dependent diabetes mellitus ([facility] )     LAST ASSESSED 4/1/15; RESOLVED 5/2/16    Hyperlipidemia     Osteoarthritis     Type 2 diabetes mellitus ([facility] )        Past Surgical History:   Procedure Laterality Date    KNEE SURGERY      NERVE BLOCK Bilateral 7/19/2019    Procedure: C3 C4 C5 C6 Medial Branch Block #1 ([id number]); Surgeon: Sampson Demarco MD;  Location: [facility] MAIN OR;  Service: Pain Management     NERVE BLOCK Bilateral 8/2/2019    Procedure: C3 C4 C5 C6 Medial Branch Block #2 ([id number]); Surgeon: Sampson Demarco MD;  Location: [facility] OR;  Service: Pain Management     REPLACEMENT TOTAL KNEE Right     WRIST SURGERY         No current facility-administered medications for this encounter  No Known Allergies    Physical Exam: There were no vitals filed for this visit    General: Awake, Alert, Oriented x 3, Mood and affect appropriate  Respiratory: Respirations even and unlabored  Cardiovascular: Peripheral pulses intact; no edema  Musculoskeletal Exam:  Cervical facet loading is positive    ASA Score:  2 Assessment:  Cervical facet syndrome    Plan:  Proceed with C3, C4, C5, and C6 cervical facet radiofrequency ablation

## 2019-08-16 NOTE — OP NOTE
ATTENDING PHYSICIAN:   Alexis Luna MD     PRE-PROCEDURE DIAGNOSIS:  Right cervical facet arthropathy  POST-PROCEDURE DIAGNOSIS: Right cervical facet arthropathy  PROCEDURE:  Right C3, C4, C5, and C6 facet nerve radiofrequency ablation under fluoroscopic guidance  ANESTHESIA:  Local  ESTIMATED BLOOD LOSS:  Minimal  COMPLICATIONS:  Of note, the patient was not able to tolerate the third burn at the C4 and C6 levels  Since he had two excellent radiofrequency ablation  Prior to this occurring at these level, I felt that he had excellent denervation at these 2 levels  LOCATION:  [address]  CONSENT:  The benefits, risks and alternatives to the procedure were discussed in detail with the patient  Risks include, but are not limited to bleeding, infection, nerve irritation or damage, seizure, headache, abscess formation, hematoma formation, reaction to the medication, failure of the pain to improve, potential worsening of the pain were explained in detail to the patient  The verbalized understanding and wished to proceed with the procedure at this time  Written informed consent was thereby obtained  HISTORY OF PRESENTING ILLNESS:   We feel radiofrequency denervation of the above-mentioned cervical facets is medically necessary, given that the patient has disabling neck pain, which we suspect is facet mediated, in the absence of nerve root compression or radicular pain  The patient has had positive confirmatory diagnostic medial branch blocks  The patient has also failed three months of conservative therapy  The patient has not been treated with radiofrequency denervation at this anatomical location within the past six months, nor had prior surgical spinal fusion at these levels  DESCRIPTION OF THE PROCEDURE: After written informed consent was obtained, the patient was taken to the fluoroscopy suite and placed in the supine position   Anatomical landmarks of the cervical spine at the above-mentioned cervical facet levels were delineated with the aid of palpation and fluoroscopy in the lateral views  The skin was then marked, prepped and draped in the usual sterile fashion using antiseptic solution  The skin and subcutaneous tissues were infiltrated with a total of 3 ml of 1% Lidocaine using a 25 gauge 1 25 inch needle  A radiofrequency 5 mm needle was then inserted and advanced under the aid of fluoroscopy in the lateral view, such that the tip contacted the cuboid lateral mass at the side of above-mentioned cervical facets  The position of the introducer and probe was then fine-tuned to be in close proximity to the nerve  Sensory and motor stimulation were tested at 2 Hz and 50 Hz with no distal stimulatory extension noted  After positioning was verified in both the lateral and AP fluoroscopic views, 1 mm of preservative free 2% Lidocaine was injected after negative aspiration  After a 90 second period, radiofrequency lesioning at 90 degrees Celsius was performed for 90 seconds  The needle was then adjusted in close proximity to the first lesioning site, and the second and third lesion was performed at this level for roughly 90 seconds at 90 degrees Celsius  The procedure was then repeated at the remaining cervical levels in a similar fashion  There was no distal stimulatory extension noted at each level  At the end of the procedure, all needles were removed intact  There were  no apparent paresthesias or complications  Hemostasis was maintained throughout  The skin was wiped clean and Band-Aids were placed as appropriate  The patient was then taken to the recovery area and monitored for a period of time, during which the patient remained hemodynamically stable and neurovascularly intact  The patient was subsequently discharged home in stable condition with supervision with instructions for follow-up  Discharge instructions were provided to the patient    I was present for and participated in all key and critical portions of this procedure      Hair Ruiz MD  8/16/2019  10:42 AM

## 2019-08-16 NOTE — DISCHARGE INSTRUCTIONS

## 2019-08-19 ENCOUNTER — TELEPHONE (OUTPATIENT)
Dept: RADIOLOGY | Facility: CLINIC | Age: 59
End: 2019-08-19

## 2019-08-19 NOTE — TELEPHONE ENCOUNTER
To be called on 8/19/19    S/P  Left C3,C4,C5,C6 RFA with AS on 8/16/19  Patient is scheduled with AS on 9/26/19 for 4 week f/u office visit

## 2019-08-19 NOTE — TELEPHONE ENCOUNTER
SW patient, very pleasant  States he has some soreness to the procedure site  Patient used ice to the area 20 minutes on and 20 minutes off along with ibuprofen  Band-aids are off, site is CDI with no s/s of infection noted  Denies any headache, fever >100, headaches, stiffness of neck or sun-burning sensation  Confirmed appointment on 8/30/19 for Left C3,C4,C5,C6 RFA with AS  Patient appreciative of call back

## 2019-08-22 ENCOUNTER — TELEPHONE (OUTPATIENT)
Dept: FAMILY MEDICINE CLINIC | Facility: CLINIC | Age: 59
End: 2019-08-22

## 2019-08-30 ENCOUNTER — HOSPITAL ENCOUNTER (OUTPATIENT)
Facility: AMBULARY SURGERY CENTER | Age: 59
Setting detail: OUTPATIENT SURGERY
Discharge: HOME | End: 2019-08-30
Attending: ANESTHESIOLOGY | Admitting: ANESTHESIOLOGY
Payer: COMMERCIAL

## 2019-08-30 ENCOUNTER — TELEPHONE (OUTPATIENT)
Dept: PAIN MEDICINE | Facility: CLINIC | Age: 59
End: 2019-08-30

## 2019-08-30 ENCOUNTER — APPOINTMENT (OUTPATIENT)
Dept: RADIOLOGY | Facility: HOSPITAL | Age: 59
End: 2019-08-30
Payer: COMMERCIAL

## 2019-08-30 VITALS
OXYGEN SATURATION: 95 % | TEMPERATURE: 97 F | HEART RATE: 59 BPM | SYSTOLIC BLOOD PRESSURE: 170 MMHG | DIASTOLIC BLOOD PRESSURE: 90 MMHG | RESPIRATION RATE: 18 BRPM

## 2019-08-30 LAB — GLUCOSE SERPL-MCNC: 196 MG/DL (ref 65–140)

## 2019-08-30 PROCEDURE — 82948 REAGENT STRIP/BLOOD GLUCOSE: CPT

## 2019-08-30 PROCEDURE — 64633 DESTROY CERV/THOR FACET JNT: CPT | Performed by: ANESTHESIOLOGY

## 2019-08-30 PROCEDURE — 72020 X-RAY EXAM OF SPINE 1 VIEW: CPT

## 2019-08-30 PROCEDURE — 64634 DESTROY C/TH FACET JNT ADDL: CPT | Performed by: ANESTHESIOLOGY

## 2019-08-30 RX ORDER — LIDOCAINE HYDROCHLORIDE 20 MG/ML
INJECTION, SOLUTION EPIDURAL; INFILTRATION; INTRACAUDAL; PERINEURAL AS NEEDED
Status: DISCONTINUED | OUTPATIENT
Start: 2019-08-30 | End: 2019-08-30 | Stop reason: HOSPADM

## 2019-08-30 RX ORDER — LIDOCAINE WITH 8.4% SOD BICARB 0.9%(10ML)
SYRINGE (ML) INJECTION AS NEEDED
Status: DISCONTINUED | OUTPATIENT
Start: 2019-08-30 | End: 2019-08-30 | Stop reason: HOSPADM

## 2019-08-30 NOTE — DISCHARGE INSTRUCTIONS

## 2019-08-30 NOTE — INTERVAL H&P NOTE
H&P reviewed  After examining the patient I find no changes in the patients condition since the H&P had been written      Vitals:    08/30/19 0738   BP: 161/90   Pulse: 68   Resp: 18   Temp: (!) 97 °F (36 1 °C)   SpO2: 97%

## 2019-08-30 NOTE — TELEPHONE ENCOUNTER
To be called on 9/3/19    S/P Left C3,C4,C5,C6 RFA with AS on 8/30/19  Patient  Is scheduled with AS on 9/26/19 for 4 week follow up office visit

## 2019-08-30 NOTE — OP NOTE
ATTENDING PHYSICIAN:   Saul Spencer MD     PRE-PROCEDURE DIAGNOSIS:  Left cervical facet arthropathy  POST-PROCEDURE DIAGNOSIS: Left cervical facet arthropathy  PROCEDURE:  Left C3, C4, C5, and C6 facet nerve radiofrequency ablation under fluoroscopic guidance  ANESTHESIA:  Local  ESTIMATED BLOOD LOSS:  Minimal  COMPLICATIONS:  None  LOCATION:  [address]  CONSENT:  The benefits, risks and alternatives to the procedure were discussed in detail with the patient  Risks include, but are not limited to bleeding, infection, nerve irritation or damage, seizure, headache, abscess formation, hematoma formation, reaction to the medication, failure of the pain to improve, potential worsening of the pain were explained in detail to the patient  The verbalized understanding and wished to proceed with the procedure at this time  Written informed consent was thereby obtained  HISTORY OF PRESENTING ILLNESS:   We feel radiofrequency denervation of the above-mentioned cervical facets is medically necessary, given that the patient has disabling neck pain, which we suspect is facet mediated, in the absence of nerve root compression or radicular pain  The patient has had positive confirmatory diagnostic medial branch blocks  The patient has also failed three months of conservative therapy  The patient has not been treated with radiofrequency denervation at this anatomical location within the past six months, nor had prior surgical spinal fusion at these levels  DESCRIPTION OF THE PROCEDURE: After written informed consent was obtained, the patient was taken to the fluoroscopy suite and placed in the supine position  Anatomical landmarks of the cervical spine at the above-mentioned cervical facet levels were delineated with the aid of palpation and fluoroscopy in the lateral views  The skin was then marked, prepped and draped in the usual sterile fashion using antiseptic solution    The skin and subcutaneous tissues were infiltrated with a total of 3 ml of 1% Lidocaine using a 25 gauge 1 25 inch needle  A radiofrequency 5 mm needle was then inserted and advanced under the aid of fluoroscopy in the lateral view, such that the tip contacted the cuboid lateral mass at the side of above-mentioned cervical facets  The position of the introducer and probe was then fine-tuned to be in close proximity to the nerve  Sensory and motor stimulation were tested at 2 Hz and 50 Hz with no distal stimulatory extension noted  After positioning was verified in both the lateral and AP fluoroscopic views, 1 mm of preservative free 2% Lidocaine was injected after negative aspiration  After a 90 second period, radiofrequency lesioning at 90 degrees Celsius was performed for 90 seconds  The needle was then adjusted in close proximity to the first lesioning site, and the second and third lesion was performed at this level for roughly 90 seconds at 90 degrees Celsius  The procedure was then repeated at the remaining cervical levels in a similar fashion  There was no distal stimulatory extension noted at each level  At the end of the procedure, all needles were removed intact  There were  no apparent paresthesias or complications  Hemostasis was maintained throughout  The skin was wiped clean and Band-Aids were placed as appropriate  The patient was then taken to the recovery area and monitored for a period of time, during which the patient remained hemodynamically stable and neurovascularly intact  The patient was subsequently discharged home in stable condition with supervision with instructions for follow-up  Discharge instructions were provided to the patient  I was present for and participated in all key and critical portions of this procedure      Jourdan Michel MD  8/30/2019  9:07 AM

## 2019-09-03 ENCOUNTER — TELEPHONE (OUTPATIENT)
Dept: RADIOLOGY | Facility: CLINIC | Age: 59
End: 2019-09-03

## 2019-09-03 ENCOUNTER — TELEPHONE (OUTPATIENT)
Dept: PAIN MEDICINE | Facility: CLINIC | Age: 59
End: 2019-09-03

## 2019-09-03 NOTE — TELEPHONE ENCOUNTER
SW patient, states that he is doing well  Slight soreness at the injection site  Denies any fever >100, redness, drainage, swelling, s/s of infection, headaches  or sun-burning sensation  Confirmed follow up office visit with AS on 9/26/19        ++Patient states he needs an excuse for work

## 2019-09-03 NOTE — TELEPHONE ENCOUNTER
SW patient, states that he is doing well  Slight soreness at the injection site  Denies any fever >100, redness, drainage, swelling, s/s of infection, headaches  or sun-burning sensation  Confirmed follow up office visit with AS on 9/26/19        ++Patient states he needs an excuse for work       Joyce Pederson has the excuse for the patient at the

## 2019-09-13 ENCOUNTER — TELEPHONE (OUTPATIENT)
Dept: PAIN MEDICINE | Facility: CLINIC | Age: 59
End: 2019-09-13

## 2019-09-13 DIAGNOSIS — G89.4 CHRONIC PAIN SYNDROME: Primary | ICD-10-CM

## 2019-09-13 DIAGNOSIS — M54.2 CERVICALGIA: ICD-10-CM

## 2019-09-13 DIAGNOSIS — M47.812 SPONDYLOSIS OF CERVICAL REGION WITHOUT MYELOPATHY OR RADICULOPATHY: ICD-10-CM

## 2019-09-13 NOTE — TELEPHONE ENCOUNTER
Pt states he feels a sunburn feeling behind his neck where dr Cassia Victor did the procedure  He says he coincidently was also outside painting, but he has been waiting all week and this hasn't gone away  It does not feel hot, but when you touch the skin it does feel like sunburn   Call back# [phone]

## 2019-09-16 PROBLEM — M54.2 NECK PAIN: Status: RESOLVED | Noted: 2019-07-16 | Resolved: 2019-09-16

## 2019-09-16 RX ORDER — METHYLPREDNISOLONE 4 MG/1
TABLET ORAL
Qty: 21 TABLET | Refills: 0 | Status: SHIPPED | OUTPATIENT
Start: 2019-09-16 | End: 2019-11-18

## 2019-09-16 NOTE — TELEPHONE ENCOUNTER
I sent a methylprednisolone dosepak to the pharmacy for him   It usually gets rid of that sunburn feeling after the RFA

## 2019-09-16 NOTE — TELEPHONE ENCOUNTER
LMOM, informed pt that prescription sent to pharmacy  Provided CB # and OH for further questions or concerns

## 2019-09-16 NOTE — TELEPHONE ENCOUNTER
Patient says that he is still sore  He says that it feels like he has a sunburn  So he wants to know is this ok or does he needs to go to the Dr  The bottom of the neck down into the shoulders it feels like a knot  That's where the sunburn feeling is as well  Please follow up patient

## 2019-09-25 ENCOUNTER — TELEPHONE (OUTPATIENT)
Dept: PAIN MEDICINE | Facility: CLINIC | Age: 59
End: 2019-09-25

## 2019-09-25 DIAGNOSIS — M79.18 MYOFASCIAL PAIN SYNDROME: Primary | ICD-10-CM

## 2019-09-25 RX ORDER — METHOCARBAMOL 750 MG/1
750 TABLET, FILM COATED ORAL
Qty: 30 TABLET | Refills: 2 | Status: SHIPPED | OUTPATIENT
Start: 2019-09-25 | End: 2020-11-01 | Stop reason: SDUPTHER

## 2019-09-25 NOTE — TELEPHONE ENCOUNTER
Pt returned call to r/s his appt due to provider being out  He scheduled for 10/21 but is requesting to see someone else sooner stating that he is still in pain especially when he moves his head down and up   Pain level 5-6/10           Pt can be reached at [phone]

## 2019-09-25 NOTE — TELEPHONE ENCOUNTER
He could be experiencing some muscle spasms  Can send rx for methocarbamol 750mg bedtime to help with the pain   If amenable, rx will be sent

## 2019-09-25 NOTE — TELEPHONE ENCOUNTER
AS pt: See below:    S/P Left C3,C4,C5,C6 RFA with AS on 8/30/19  Doing well initially, but experienced sunburn-like feeling on 9/13/19  Medrol dosepak Rx'd 9/16/29    Today, states that he is still having discomfort when he raises or lowers his head  Feels like there is a knot in his neck where it meets his shoulder on the left side  Turning head left to right has improved  Is using heat and taking ibuprofen  Denies using ice since he feels the heat works better  States pain is worse when he is doing things, ayden looking up for prolonged periods  Advised pt it can take 4-6 weeks for maximum benefit after this procedure  Pt states he thinks it's due to him feeling the burning during procedure, states it was particularly bad and that they "burned it" 3 times  Advised pt this would be relayed to on call MD, and will CB with any recommendations  Will also forward to Clerical team in case there would be an opening sooner to see AS for follow up  Any suggestions?

## 2019-10-21 ENCOUNTER — OFFICE VISIT (OUTPATIENT)
Dept: PAIN MEDICINE | Facility: CLINIC | Age: 59
End: 2019-10-21
Payer: COMMERCIAL

## 2019-10-21 VITALS
DIASTOLIC BLOOD PRESSURE: 79 MMHG | HEIGHT: 74 IN | BODY MASS INDEX: 30.31 KG/M2 | WEIGHT: 236.2 LBS | SYSTOLIC BLOOD PRESSURE: 125 MMHG | HEART RATE: 75 BPM

## 2019-10-21 DIAGNOSIS — M79.18 MYOFASCIAL PAIN SYNDROME: ICD-10-CM

## 2019-10-21 DIAGNOSIS — M54.2 CERVICALGIA: ICD-10-CM

## 2019-10-21 DIAGNOSIS — G89.4 CHRONIC PAIN SYNDROME: Primary | ICD-10-CM

## 2019-10-21 PROCEDURE — 99214 OFFICE O/P EST MOD 30 MIN: CPT | Performed by: ANESTHESIOLOGY

## 2019-10-21 NOTE — PROGRESS NOTES
Pain Medicine Follow-Up Note    Assessment:  1  Chronic pain syndrome    2  Cervicalgia    3  Myofascial pain syndrome        Plan:  My impressions and treatment recommendations were discussed in detail with the patient who verbalized understanding and had no further questions  The patient is reporting excellent pain relief since undergoing the cervical facet radiofrequency ablation in August 2019  He states that he has much better mobility in his neck, but he still is complaining of a significant amount of stiffness  He did trial methocarbamol as a muscle relaxant, but still has significant stiffness in his neck  On my examination today, I do appreciate trigger points  I did feel reasonable to offer the patient trigger point injections since this could be potentially therapeutic  The procedures, its risks, and benefits were explained in detail to the patient  Risks include but are not limited to bleeding, infection, hematoma formation, abscess formation, weakness, headache, failure the pain to improve, nerve irritation or damage, and potential worsening of the pain  The patient verbalized understanding and wished to proceed with the procedure  Follow-up is planned in 4 weeks time or sooner as warranted  Discharge instructions were provided  I personally saw and examined the patient and I agree with the above discussed plan of care  History of Present Illness:    Gonzalo Rinne is a 60 y o  male who presents to [facility] for interval re-evaluation of the above stated pain complaints  The patient has a past medical and chronic pain history as outlined in the assessment section  He was last seen on August 30, 2019 at which time he underwent cervical facet radiofrequency ablation  At today's office visit, the patient's pain score is 5/10 on the verbal numerical pain rating scale  The patient states that his pain is worse in the evening and night    His pain is intermittent in nature  He reports the quality of his pain as dull/aching, sharp, and throbbing    He is reporting the pain in the center of his neck  He states that he is getting significant improvement in terms of his overall neck mobility since undergoing the cervical facet radiofrequency ablation procedure  He is very happy with the results so far, but still complains of significant stiffness in his neck  He does have trigger points palpable on examination today  Other than as stated above, the patient denies any interval changes in medications, medical condition, mental condition, symptoms, or allergies since the last office visit  Review of Systems:    Review of Systems   Respiratory: Negative for shortness of breath  Cardiovascular: Negative for chest pain  Gastrointestinal: Negative for constipation, diarrhea, nausea and vomiting  Musculoskeletal: Positive for joint swelling  Negative for arthralgias, gait problem and myalgias  Decreased ROM   Pain    Skin: Negative for rash  Neurological: Negative for dizziness, seizures and weakness  All other systems reviewed and are negative          Patient Active Problem List   Diagnosis    Arthritis    CKD stage 1 due to type 2 diabetes mellitus ([facility] )    Type 2 diabetes mellitus with stage 1 chronic kidney disease, without long-term current use of insulin (HCC)    Edema extremities    Hyperlipidemia LDL goal <100    Low HDL (under 40)    Localized, primary osteoarthritis    Osteoarthritis of knee    Tear of meniscus of knee    Colon cancer screening    Chronic pain syndrome    Spondylosis of cervical region without myelopathy or radiculopathy    Cervical spondylosis without myelopathy    Cervicalgia       Past Medical History:   Diagnosis Date    Benign essential hypertension     LAST ASSESSED 5/1/17; RESOLVED 5/1/17    CAD (coronary artery disease)     RESOLVED 5/1/17    Controlled insulin dependent diabetes mellitus ([facility] )     LAST ASSESSED 4/1/15; RESOLVED 5/2/16    Hyperlipidemia     Osteoarthritis     Type 2 diabetes mellitus ([facility] )        Past Surgical History:   Procedure Laterality Date    KNEE SURGERY      NERVE BLOCK Bilateral 7/19/2019    Procedure: C3 C4 C5 C6 Medial Branch Block #1 ([id number]); Surgeon: Theresa Early MD;  Location: [facility] MAIN OR;  Service: Pain Management     NERVE BLOCK Bilateral 8/2/2019    Procedure: C3 C4 C5 C6 Medial Branch Block #2 ([id number]);   Surgeon: Theresa Early MD;  Location: [facility] MAIN OR;  Service: Pain Management     RADIOFREQUENCY ABLATION Right 8/16/2019    Procedure: C3 C4 C5 C6 RADIOFREQUENCY ABLATION;  Surgeon: Theresa Early MD;  Location: [facility] MAIN OR;  Service: Pain Management     RADIOFREQUENCY ABLATION Left 8/30/2019    Procedure: C3 C4 C5 C6 RADIOFREQUENCY ABLATION;  Surgeon: Theresa Early MD;  Location: [facility] MAIN OR;  Service: Pain Management     REPLACEMENT TOTAL KNEE Right     WRIST SURGERY         Family History   Problem Relation Age of Onset    Cancer Family     Cancer Mother     No Known Problems Sister     No Known Problems Brother     No Known Problems Maternal Aunt     No Known Problems Maternal Uncle     No Known Problems Paternal Aunt     No Known Problems Paternal Uncle     No Known Problems Maternal Grandmother     No Known Problems Maternal Grandfather     No Known Problems Paternal Grandmother     No Known Problems Paternal Grandfather        Social History     Occupational History    Not on file   Tobacco Use    Smoking status: Never Smoker    Smokeless tobacco: Never Used   Substance and Sexual Activity    Alcohol use: No    Drug use: Never    Sexual activity: Not on file         Current Outpatient Medications:     glimepiride (AMARYL) 2 mg tablet, Take 1 tablet (2 mg total) by mouth daily with breakfast, Disp: 30 tablet, Rfl: 5    glucose blood (ONETOUCH VERIO) test strip, Use as instructed 1x/d [E11 20], Disp: 100 each, Rfl: 1    lisinopril (ZESTRIL) 10 mg tablet, Take 1 tablet (10 mg total) by mouth daily, Disp: 90 tablet, Rfl: 1    metFORMIN (GLUCOPHAGE) 1000 MG tablet, Take 1 tablet (1,000 mg total) by mouth 2 (two) times a day with meals, Disp: 60 tablet, Rfl: 5    methocarbamol (ROBAXIN) 750 mg tablet, Take 1 tablet (750 mg total) by mouth daily at bedtime as needed for muscle spasms, Disp: 30 tablet, Rfl: 2    methylPREDNISolone 4 MG tablet therapy pack, Use as directed on package, Disp: 21 tablet, Rfl: 0    ONE TOUCH LANCETS MISC, by Does not apply route daily Use as instructed 1x/d [E11 20], Disp: 100 each, Rfl: 3    pravastatin (PRAVACHOL) 20 mg tablet, Take 1 tablet (20 mg total) by mouth daily, Disp: 90 tablet, Rfl: 1    No Known Allergies    Physical Exam:    /79   Pulse 75   Ht 6' 1 5" (1 867 m)   Wt 107 kg (236 lb 3 2 oz)   BMI 30 74 kg/m²     Constitutional:obese  Eyes:anicteric  HEENT:grossly intact  Neck:supple, symmetric, trachea midline and no masses   Pulmonary:even and unlabored  Cardiovascular:No edema or pitting edema present  Skin:Normal without rashes or lesions and well hydrated  Psychiatric:Mood and affect appropriate  Neurologic:Cranial Nerves II-XII grossly intact  Musculoskeletal:Trigger points palpable in the cervical paraspinal region

## 2019-10-24 ENCOUNTER — PROCEDURE VISIT (OUTPATIENT)
Dept: PAIN MEDICINE | Facility: CLINIC | Age: 59
End: 2019-10-24
Payer: COMMERCIAL

## 2019-10-24 DIAGNOSIS — M79.18 MYOFASCIAL PAIN SYNDROME: Primary | ICD-10-CM

## 2019-10-24 PROCEDURE — 20553 NJX 1/MLT TRIGGER POINTS 3/>: CPT | Performed by: ANESTHESIOLOGY

## 2019-10-24 RX ORDER — METHYLPREDNISOLONE ACETATE 40 MG/ML
40 INJECTION, SUSPENSION INTRA-ARTICULAR; INTRALESIONAL; INTRAMUSCULAR; SOFT TISSUE ONCE
Status: COMPLETED | OUTPATIENT
Start: 2019-10-24 | End: 2019-10-24

## 2019-10-24 RX ORDER — LIDOCAINE HYDROCHLORIDE 10 MG/ML
1 INJECTION, SOLUTION INFILTRATION; PERINEURAL ONCE
Status: COMPLETED | OUTPATIENT
Start: 2019-10-24 | End: 2019-10-24

## 2019-10-24 RX ADMIN — METHYLPREDNISOLONE ACETATE 40 MG: 40 INJECTION, SUSPENSION INTRA-ARTICULAR; INTRALESIONAL; INTRAMUSCULAR; SOFT TISSUE at 10:27

## 2019-10-24 RX ADMIN — LIDOCAINE HYDROCHLORIDE 1 ML: 10 INJECTION, SOLUTION INFILTRATION; PERINEURAL at 10:26

## 2019-10-24 NOTE — LETTER
October 24, 2019     Nuha Sams, DO  [address] [address]    Patient: Sally Carney Sr  YOB: 1960   Date of Visit: 10/24/2019       Dear Dr Vargas Gain: Thank you for referring Sathish Chakraborty to me for evaluation  Below are my notes for this consultation  If you have questions, please do not hesitate to call me  I look forward to following your patient along with you  Sincerely,        Christina Grey MD        CC: No Recipients  Christina Grey MD  10/24/2019 10:09 AM  Sign at close encounter        [address] Single/Multiple     Date/Time 10/24/2019 10:07 AM     Performed by  Christina Grey MD     Authorized by Christina Grey MD      Universal Protocol Consent: Verbal consent obtained  Written consent obtained  Risks and benefits: risks, benefits and alternatives were discussed  Consent given by: patient  Patient understanding: patient states understanding of the procedure being performed  Patient consent: the patient's understanding of the procedure matches consent given  Procedure consent: procedure consent matches procedure scheduled  Relevant documents: relevant documents present and verified  Test results: test results available and properly labeled  Site marked: the operative site was marked  Radiology Images displayed and confirmed  If images not available, report reviewed: imaging studies available  Patient identity confirmed: verbally with patient  Time out: Immediately prior to procedure a "time out" was called to verify the correct patient, procedure, equipment, support staff and site/side marked as required  Preparation: Patient was prepped and draped in the usual sterile fashion        Site preparation: Betadine    Local anesthesia used: yes      Anesthesia: local infiltration     Anesthesia   Local anesthesia used: yes  Local Anesthetic: lidocaine 1% without epinephrine     Sedation   Patient sedated: no        Specimen: no Culture: no   Procedure Details   Procedure Notes: ATTENDING PHYSICIAN:  Chanda Ojeda MD     PROCEDURE:  Trigger point injections x1 to the left cervical paraspinal, x1 to the right cervical paraspinal, x1 to the left upper trapezius, and x1 to the right upper trapezius musculature with local anesthetic and steroid  PRE-PROCEDURE DIAGNOSIS:  Myofascial pain syndrome  POST-PROCEDURE DIAGNOSIS:  Myofascial pain syndrome  ESTIMATED BLOOD LOSS:  Minimal     ANESTHESIA:  None  COMPLICATIONS:  None  CONSENT:  Today's procedure, its potential benefits as well as its risks and side effects were reviewed  Discussed risks of the procedure including bleeding, infection, reactions to the medications, failure the pain to improve, and potential worsening of the pain as well as pneumothorax were explained in detail to the patient, who verbalized understanding and wished to proceed  Written informed consent was thereby obtained  DESCRIPTION OF THE PROCEDURE:  After written informed consent was obtained, the patient was placed in the sitting position on the examination table  Anatomical landmarks were identified via palpation  The trigger points were identified and marked after palpation  The skin overlying these 4 marked trigger points was prepared using Betadine swabs x3 in the usual sterile fashion  Strict aseptic technique was utilized throughout the procedure  A 4 mL injectate consisting of 3 mL of 1% lidocaine mixed with 1 mL of Depo-Medrol 40 mg/mL was drawn up sterilely  Using a 25-gauge 1 25 inch needle, 1 mL of the above injectate was administered to each of the marked trigger points following negative aspiration  The patient tolerated the procedure well and all needles were removed with the tips intact  Hemostasis was maintained  There were no apparent complications  The skin was wiped clean and Band-Aids were placed as appropriate   The patient was monitored for an appropriate period of time following the procedure and remained hemodynamically stable and neurovascularly intact following the procedure as she was prior to the procedure  The patient was ultimately discharged to home with supervision in good condition  I was present for and participated in all key and critical portions of this procedure    Patient tolerance: Patient tolerated the procedure well with no immediate complications

## 2019-10-24 NOTE — PROGRESS NOTES
Inj Trigger Point Single/Multiple     Date/Time 10/24/2019 10:07 AM     Performed by  Ethan Martin MD     Authorized by Ethan Martin MD      Universal Protocol Consent: Verbal consent obtained  Written consent obtained  Risks and benefits: risks, benefits and alternatives were discussed  Consent given by: patient  Patient understanding: patient states understanding of the procedure being performed  Patient consent: the patient's understanding of the procedure matches consent given  Procedure consent: procedure consent matches procedure scheduled  Relevant documents: relevant documents present and verified  Test results: test results available and properly labeled  Site marked: the operative site was marked  Radiology Images displayed and confirmed  If images not available, report reviewed: imaging studies available  Patient identity confirmed: verbally with patient  Time out: Immediately prior to procedure a "time out" was called to verify the correct patient, procedure, equipment, support staff and site/side marked as required  Preparation: Patient was prepped and draped in the usual sterile fashion  Site preparation: Betadine    Local anesthesia used: yes      Anesthesia: local infiltration     Anesthesia   Local anesthesia used: yes  Local Anesthetic: lidocaine 1% without epinephrine     Sedation   Patient sedated: no        Specimen: no    Culture: no   Procedure Details   Procedure Notes: ATTENDING PHYSICIAN:  Ethan Martin MD     PROCEDURE:  Trigger point injections x1 to the left cervical paraspinal, x1 to the right cervical paraspinal, x1 to the left upper trapezius, and x1 to the right upper trapezius musculature with local anesthetic and steroid  PRE-PROCEDURE DIAGNOSIS:  Myofascial pain syndrome  POST-PROCEDURE DIAGNOSIS:  Myofascial pain syndrome  ESTIMATED BLOOD LOSS:  Minimal     ANESTHESIA:  None  COMPLICATIONS:  None      CONSENT:  Today's procedure, its potential benefits as well as its risks and side effects were reviewed  Discussed risks of the procedure including bleeding, infection, reactions to the medications, failure the pain to improve, and potential worsening of the pain as well as pneumothorax were explained in detail to the patient, who verbalized understanding and wished to proceed  Written informed consent was thereby obtained  DESCRIPTION OF THE PROCEDURE:  After written informed consent was obtained, the patient was placed in the sitting position on the examination table  Anatomical landmarks were identified via palpation  The trigger points were identified and marked after palpation  The skin overlying these 4 marked trigger points was prepared using Betadine swabs x3 in the usual sterile fashion  Strict aseptic technique was utilized throughout the procedure  A 4 mL injectate consisting of 3 mL of 1% lidocaine mixed with 1 mL of Depo-Medrol 40 mg/mL was drawn up sterilely  Using a 25-gauge 1 25 inch needle, 1 mL of the above injectate was administered to each of the marked trigger points following negative aspiration  The patient tolerated the procedure well and all needles were removed with the tips intact  Hemostasis was maintained  There were no apparent complications  The skin was wiped clean and Band-Aids were placed as appropriate  The patient was monitored for an appropriate period of time following the procedure and remained hemodynamically stable and neurovascularly intact following the procedure as she was prior to the procedure  The patient was ultimately discharged to home with supervision in good condition  I was present for and participated in all key and critical portions of this procedure    Patient tolerance: Patient tolerated the procedure well with no immediate complications

## 2019-11-06 DIAGNOSIS — E11.29 TYPE 2 DIABETES MELLITUS WITH OTHER DIABETIC KIDNEY COMPLICATION, WITHOUT LONG-TERM CURRENT USE OF INSULIN (HCC): ICD-10-CM

## 2019-11-06 RX ORDER — LISINOPRIL 10 MG/1
TABLET ORAL
Qty: 30 TABLET | Refills: 0 | Status: SHIPPED | OUTPATIENT
Start: 2019-11-06 | End: 2020-06-08

## 2019-11-18 ENCOUNTER — OFFICE VISIT (OUTPATIENT)
Dept: URGENT CARE | Facility: CLINIC | Age: 59
End: 2019-11-18
Payer: COMMERCIAL

## 2019-11-18 VITALS
HEART RATE: 65 BPM | SYSTOLIC BLOOD PRESSURE: 120 MMHG | WEIGHT: 235.4 LBS | RESPIRATION RATE: 18 BRPM | BODY MASS INDEX: 30.21 KG/M2 | OXYGEN SATURATION: 99 % | DIASTOLIC BLOOD PRESSURE: 80 MMHG | TEMPERATURE: 97.9 F | HEIGHT: 74 IN

## 2019-11-18 DIAGNOSIS — M54.50 MIDLINE LOW BACK PAIN WITHOUT SCIATICA, UNSPECIFIED CHRONICITY: Primary | ICD-10-CM

## 2019-11-18 PROCEDURE — 99213 OFFICE O/P EST LOW 20 MIN: CPT | Performed by: FAMILY MEDICINE

## 2019-11-18 RX ORDER — METHYLPREDNISOLONE 4 MG/1
TABLET ORAL
Qty: 21 EACH | Refills: 0 | Status: SHIPPED | OUTPATIENT
Start: 2019-11-18 | End: 2020-04-17

## 2019-11-18 RX ORDER — LIDOCAINE 50 MG/G
1 PATCH TOPICAL DAILY
Qty: 15 PATCH | Refills: 0 | Status: SHIPPED | OUTPATIENT
Start: 2019-11-18 | End: 2021-12-02

## 2019-11-18 NOTE — PROGRESS NOTES
[facility]        NAME: Libra Box is a 60 y o  male  : 1960    MRN: [id number]  DATE: 2019  TIME: 2:10 PM    Assessment and Plan   Midline low back pain without sciatica, unspecified chronicity [M54 5]  1  Midline low back pain without sciatica, unspecified chronicity  lidocaine (LIDODERM) 5 %    methylPREDNISolone 4 MG tablet therapy pack     Lower back pain likely secondary to paraspinal muscle strain  Lidoderm patches and a Medrol Dosepak prescribed  Patient encouraged to continue with Robaxin and to ice the lower back for symptomatic relief  Patient Instructions     Follow up with PCP in 3-5 days  Proceed to  ER if symptoms worsen  Chief Complaint     Chief Complaint   Patient presents with    Back Pain     Since Thursday - injured low back after lifting 50 lb can  No radiation or numbness down legs  Took left over muscle relaxant on Saturday and Motrin 600 mg - last at 12 noon  History of Present Illness       24-year-old male presents today due to lower back pain of acute onset which started about 4 days ago  Recalls experiencing the same pain once before about 8-9 years ago  At that time he required adjustments by chiropractor which were effective  Of note, has also dealt with chronic cervical pain requiring nerve ablations  Does not recall any direct trauma to the back  Was lifting a 50 lb bucket of water when his lower back gave way  Describes the pain as pulsatile and sometimes sharp ranging from a 5/10 to a 10/10 in intensity  Worsened with certain movements of the torso  Denies any red flag symptoms such as fever, chills, saddle paresthesias, weakness or numbness  Review of Systems   Review of Systems   Constitutional: Negative for chills and fever  Musculoskeletal: Positive for back pain, gait problem, neck pain and neck stiffness  Negative for joint swelling           Current Medications       Current Outpatient Medications:   glimepiride (AMARYL) 2 mg tablet, Take 1 tablet (2 mg total) by mouth daily with breakfast, Disp: 30 tablet, Rfl: 5    glucose blood (ONETOUCH VERIO) test strip, Use as instructed 1x/d [E11 20], Disp: 100 each, Rfl: 1    lisinopril (ZESTRIL) 10 mg tablet, TAKE ONE TABLET BY MOUTH EVERY DAY, Disp: 30 tablet, Rfl: 0    metFORMIN (GLUCOPHAGE) 1000 MG tablet, Take 1 tablet (1,000 mg total) by mouth 2 (two) times a day with meals, Disp: 60 tablet, Rfl: 5    ONE TOUCH LANCETS MISC, by Does not apply route daily Use as instructed 1x/d [E11 20], Disp: 100 each, Rfl: 3    pravastatin (PRAVACHOL) 20 mg tablet, Take 1 tablet (20 mg total) by mouth daily, Disp: 90 tablet, Rfl: 1    lidocaine (LIDODERM) 5 %, Apply 1 patch topically daily Remove & Discard patch within 12 hours or as directed by MD, Disp: 15 patch, Rfl: 0    methocarbamol (ROBAXIN) 750 mg tablet, Take 1 tablet (750 mg total) by mouth daily at bedtime as needed for muscle spasms (Patient not taking: Reported on 11/18/2019), Disp: 30 tablet, Rfl: 2    methylPREDNISolone 4 MG tablet therapy pack, Use as directed on package, Disp: 21 each, Rfl: 0    Current Allergies     Allergies as of 11/18/2019    (No Known Allergies)            The following portions of the patient's history were reviewed and updated as appropriate: allergies, current medications, past family history, past medical history, past social history, past surgical history and problem list      Past Medical History:   Diagnosis Date    Benign essential hypertension     LAST ASSESSED 5/1/17; RESOLVED 5/1/17    Controlled insulin dependent diabetes mellitus ([address] )     LAST ASSESSED 4/1/15; RESOLVED 5/2/16    Hyperlipidemia     Osteoarthritis     Pericarditis     Type 2 diabetes mellitus ([address] )        Past Surgical History:   Procedure Laterality Date    KNEE SURGERY      NERVE BLOCK Bilateral 7/19/2019    Procedure: C3 C4 C5 C6 Medial Branch Block #1 ([id number]);   Surgeon: Tahira Gomes Jeramy Davis MD;  Location: [place] [address] MAIN OR;  Service: Pain Management     NERVE BLOCK Bilateral 8/2/2019    Procedure: C3 C4 C5 C6 Medial Branch Block #2 ([id number]); Surgeon: Sylvia Roy MD;  Location: [facility] MAIN OR;  Service: Pain Management     RADIOFREQUENCY ABLATION Right 8/16/2019    Procedure: C3 C4 C5 C6 RADIOFREQUENCY ABLATION;  Surgeon: Sylvia Roy MD;  Location: [place] [address] MAIN OR;  Service: Pain Management     RADIOFREQUENCY ABLATION Left 8/30/2019    Procedure: C3 C4 C5 C6 RADIOFREQUENCY ABLATION;  Surgeon: Sylvia Roy MD;  Location: [place] [address] MAIN OR;  Service: Pain Management     REPLACEMENT TOTAL KNEE Right     WRIST SURGERY         Family History   Problem Relation Age of Onset    Cancer Family     Cancer Mother     No Known Problems Sister     No Known Problems Brother     No Known Problems Maternal Aunt     No Known Problems Maternal Uncle     No Known Problems Paternal Aunt     No Known Problems Paternal Uncle     No Known Problems Maternal Grandmother     No Known Problems Maternal Grandfather     No Known Problems Paternal Grandmother     No Known Problems Paternal Grandfather          Medications have been verified  Objective   /80 (BP Location: Left arm, Patient Position: Sitting, Cuff Size: Large)   Pulse 65   Temp 97 9 °F (36 6 °C) (Tympanic)   Resp 18   Ht 6' 1 5" (1 867 m)   Wt 107 kg (235 lb 6 4 oz)   SpO2 99%   BMI 30 64 kg/m²        Physical Exam     Physical Exam   Constitutional: He is oriented to person, place, and time  He appears well-developed and well-nourished  He appears distressed  HENT:   Head: Normocephalic and atraumatic  Eyes: Conjunctivae are normal    Pulmonary/Chest: Effort normal    Musculoskeletal: He exhibits tenderness (Right paraspinal muscles)  He exhibits no edema or deformity  Neurological: He is alert and oriented to person, place, and time  No sensory deficit  Skin: Skin is warm  He is not diaphoretic   No erythema  Psychiatric: He has a normal mood and affect  His behavior is normal  Judgment and thought content normal    Nursing note and vitals reviewed

## 2019-11-18 NOTE — LETTER
November 18, 2019     Patient: Liane Hashimoto  YOB: 1960   Date of Visit: 11/18/2019       To Whom It May Concern: It is my medical opinion that Elisabeth Carolina may return to work on 11/20/2019  Please excuse his prior abscesses  Appears to have sprained his lower back  If you have any questions or concerns, please don't hesitate to call           Sincerely,        Patricia Draper MD    CC: No Recipients

## 2019-11-21 ENCOUNTER — OFFICE VISIT (OUTPATIENT)
Dept: PAIN MEDICINE | Facility: CLINIC | Age: 59
End: 2019-11-21
Payer: COMMERCIAL

## 2019-11-21 ENCOUNTER — OFFICE VISIT (OUTPATIENT)
Dept: URGENT CARE | Facility: CLINIC | Age: 59
End: 2019-11-21
Payer: COMMERCIAL

## 2019-11-21 VITALS
DIASTOLIC BLOOD PRESSURE: 93 MMHG | HEIGHT: 73 IN | WEIGHT: 228 LBS | BODY MASS INDEX: 30.22 KG/M2 | SYSTOLIC BLOOD PRESSURE: 184 MMHG | OXYGEN SATURATION: 98 % | HEART RATE: 75 BPM | RESPIRATION RATE: 16 BRPM | TEMPERATURE: 97.1 F

## 2019-11-21 VITALS
DIASTOLIC BLOOD PRESSURE: 95 MMHG | SYSTOLIC BLOOD PRESSURE: 157 MMHG | WEIGHT: 228.4 LBS | HEIGHT: 74 IN | BODY MASS INDEX: 29.31 KG/M2 | HEART RATE: 78 BPM

## 2019-11-21 DIAGNOSIS — M54.2 CERVICALGIA: ICD-10-CM

## 2019-11-21 DIAGNOSIS — M47.812 CERVICAL SPONDYLOSIS WITHOUT MYELOPATHY: ICD-10-CM

## 2019-11-21 DIAGNOSIS — G89.4 CHRONIC PAIN SYNDROME: Primary | ICD-10-CM

## 2019-11-21 DIAGNOSIS — M54.50 MIDLINE LOW BACK PAIN WITHOUT SCIATICA, UNSPECIFIED CHRONICITY: Primary | ICD-10-CM

## 2019-11-21 PROCEDURE — 99213 OFFICE O/P EST LOW 20 MIN: CPT | Performed by: PHYSICIAN ASSISTANT

## 2019-11-21 PROCEDURE — 99213 OFFICE O/P EST LOW 20 MIN: CPT | Performed by: ANESTHESIOLOGY

## 2019-11-21 NOTE — PROGRESS NOTES
[facility]        NAME: Luciano Yanez is a 60 y o  male  : 1960    MRN: [id number]  DATE: 2019  TIME: 11:08 AM    Assessment and Plan   Midline low back pain without sciatica, unspecified chronicity [M54 5]  1  Midline low back pain without sciatica, unspecified chronicity       Patient Instructions     Complete the steroid and muscle relaxant as previously directed  You may take Tylenol for residual discomfort  Apply ice to your back for 20-30 minutes at a time, 3-4 times daily  You may apply heat for 20-30 minutes at a time, as needed for stiffness  Perform stretching exercises 2-3 times daily as reviewed in office  Follow up with your family doctor or spine doctor in 3-5 days if symptoms persist  Proceed to the ER if symptoms worsen  Pt continues to exhibit signs and symptoms of a lumbar strain  Encouraged above measures and follow up with Pain Management for further evaluation and management  He was agreeable  All questions answered  Precautions given  Work note provided  Chief Complaint     Chief Complaint   Patient presents with    Back Pain     Patient complains of lower back pain started last Thursday, pain has gotten worse  Was seen on Monday but has had no relief  History of Present Illness     23-year-old male presenting with complaint of back pain x 6 days  Reports he went to lift a garbage can jail full of water, weight approximately 50 lbs, and states he felt a pull in his lower back  Notes he attempted to hold the water away from his body, and pulled with his arms rather than lifting with the legs  Believes this is why he pulled his back  Since then has had a constant sharp pain in the left lower back that is worsened with prolonged sitting and with first movement  Loosens with prolonged activity and when lying down, but is still present at mild severity   Is currently on day 3 of medrol dose pack, and is finding relief with muscle relaxant and biofreeze  No relief with lidoderm patch  No numbness, tingling, incontinence, saddle anesthesia or muscle weakness  Is requesting note for work, as he performs manual labor and has been unable to go for past week secondary to pain  Review of Systems   Review of Systems   Constitutional: Negative for chills, diaphoresis, fatigue and fever  Respiratory: Negative for cough  Cardiovascular: Negative for chest pain  Gastrointestinal: Negative for abdominal pain, nausea and vomiting  Musculoskeletal: Negative for arthralgias and myalgias  Other than as noted in HPI  Skin: Negative for color change, rash and wound  Neurological: Negative for light-headedness and headaches       Current Medications       Current Outpatient Medications:     glimepiride (AMARYL) 2 mg tablet, Take 1 tablet (2 mg total) by mouth daily with breakfast, Disp: 30 tablet, Rfl: 5    glucose blood (ONETOUCH VERIO) test strip, Use as instructed 1x/d [E11 20], Disp: 100 each, Rfl: 1    lidocaine (LIDODERM) 5 %, Apply 1 patch topically daily Remove & Discard patch within 12 hours or as directed by MD, Disp: 15 patch, Rfl: 0    lisinopril (ZESTRIL) 10 mg tablet, TAKE ONE TABLET BY MOUTH EVERY DAY, Disp: 30 tablet, Rfl: 0    metFORMIN (GLUCOPHAGE) 1000 MG tablet, Take 1 tablet (1,000 mg total) by mouth 2 (two) times a day with meals, Disp: 60 tablet, Rfl: 5    methocarbamol (ROBAXIN) 750 mg tablet, Take 1 tablet (750 mg total) by mouth daily at bedtime as needed for muscle spasms, Disp: 30 tablet, Rfl: 2    methylPREDNISolone 4 MG tablet therapy pack, Use as directed on package, Disp: 21 each, Rfl: 0    ONE TOUCH LANCETS MISC, by Does not apply route daily Use as instructed 1x/d [E11 20], Disp: 100 each, Rfl: 3    pravastatin (PRAVACHOL) 20 mg tablet, Take 1 tablet (20 mg total) by mouth daily, Disp: 90 tablet, Rfl: 1    Current Allergies     Allergies as of 11/21/2019    (No Known Allergies)          The following portions of the patient's history were reviewed and updated as appropriate: allergies, current medications, past family history, past medical history, past social history, past surgical history and problem list      Past Medical History:   Diagnosis Date    Benign essential hypertension     LAST ASSESSED 5/1/17; RESOLVED 5/1/17    Chronic pain disorder     Controlled insulin dependent diabetes mellitus ([facility] )     LAST ASSESSED 4/1/15; RESOLVED 5/2/16    Hyperlipidemia     Low back pain     Neck pain     Osteoarthritis     Pericarditis     Type 2 diabetes mellitus ([facility] )        Past Surgical History:   Procedure Laterality Date    KNEE SURGERY      NERVE BLOCK Bilateral 7/19/2019    Procedure: C3 C4 C5 C6 Medial Branch Block #1 ([id number]); Surgeon: Alina Pelletier MD;  Location: [facility] MAIN OR;  Service: Pain Management     NERVE BLOCK Bilateral 8/2/2019    Procedure: C3 C4 C5 C6 Medial Branch Block #2 ([id number]);   Surgeon: Alina Pelletier MD;  Location: [facility] MAIN OR;  Service: Pain Management     RADIOFREQUENCY ABLATION Right 8/16/2019    Procedure: C3 C4 C5 C6 RADIOFREQUENCY ABLATION;  Surgeon: Alina Pelletier MD;  Location: [facility] MAIN OR;  Service: Pain Management     RADIOFREQUENCY ABLATION Left 8/30/2019    Procedure: C3 C4 C5 C6 RADIOFREQUENCY ABLATION;  Surgeon: Alina Pelletier MD;  Location: [facility] MAIN OR;  Service: Pain Management     REPLACEMENT TOTAL KNEE Right     WRIST SURGERY         Family History   Problem Relation Age of Onset    Cancer Family     Cancer Mother     No Known Problems Sister     No Known Problems Brother     No Known Problems Maternal Aunt     No Known Problems Maternal Uncle     No Known Problems Paternal Aunt     No Known Problems Paternal Uncle     No Known Problems Maternal Grandmother     No Known Problems Maternal Grandfather     No Known Problems Paternal Grandmother     No Known Problems Paternal Grandfather      Medications have been verified  Objective   BP (!) 184/93   Pulse 75   Temp (!) 97 1 °F (36 2 °C)   Resp 16   Ht 6' 1" (1 854 m)   Wt 103 kg (228 lb)   SpO2 98%   BMI 30 08 kg/m²        Physical Exam     Physical Exam   Constitutional: Vital signs are normal  He appears well-developed and well-nourished  He is cooperative  He does not appear ill  No distress  HENT:   Head: Normocephalic and atraumatic  Eyes: Conjunctivae and lids are normal  Right eye exhibits no chemosis, no discharge and no exudate  Left eye exhibits no chemosis, no discharge and no exudate  Right conjunctiva is not injected  Left conjunctiva is not injected  Cardiovascular: Normal rate, regular rhythm and normal heart sounds  Exam reveals no distant heart sounds  Pulmonary/Chest: Effort normal and breath sounds normal  No stridor  No respiratory distress  He has no decreased breath sounds  He has no wheezes  He has no rhonchi  He has no rales  Abdominal: Soft  Bowel sounds are normal  He exhibits no distension and no mass  There is no tenderness  There is no rigidity, no rebound and no guarding  Musculoskeletal:        Lumbar back: He exhibits decreased range of motion, tenderness (left lumbar psm), pain (with movement) and spasm (left psm)  He exhibits no bony tenderness, no swelling, no edema, no deformity, no laceration and normal pulse  LE neurovascularly intact  Neurological: He is alert  He is not disoriented  No cranial nerve deficit  Coordination and gait normal    Skin: Skin is warm, dry and intact  No rash noted  He is not diaphoretic  No erythema  No pallor  Psychiatric: He has a normal mood and affect  His behavior is normal  Judgment and thought content normal    Nursing note and vitals reviewed

## 2019-11-21 NOTE — PROGRESS NOTES
Pain Medicine Follow-Up Note    Assessment:  1  Chronic pain syndrome    2  Cervicalgia    3  Cervical spondylosis without myelopathy        Plan:  My impressions and treatment recommendations were discussed in detail with the patient who verbalized understanding and had no further questions  The patient is reporting excellent pain relief since undergoing the trigger point injections as well as the cervical facet radiofrequency ablation procedures  He is currently 1/10 pain on the verbal numerical pain rating scale and is very satisfied with the results  I did discuss with the patient that should his pain return, would be happy to continue to perform interventional pain procedures for him  The patient verbalized understanding  Follow-up is planned on an as-needed basis  Discharge instructions were provided  I personally saw and examined the patient and I agree with the above discussed plan of care  History of Present Illness:    Rossana Mansfield is a 60 y o  male who presents to [facility] for interval re-evaluation of the above stated pain complaints  The patient has a past medical and chronic pain history as outlined in the assessment section  He was last seen on October 24, 2019 at which time he underwent trigger point injections  At today's office visit, the patient's pain score is 1/10 on the verbal numerical pain rating scale  The patient states that his pain is primarily in his neck and occasional in nature  He reports the quality of his pain as dull/aching and pins and needles    He is reporting excellent relief of symptoms since the trigger point injections as well as the cervical facet radiofrequency ablation  Other than as stated above, the patient denies any interval changes in medications, medical condition, mental condition, symptoms, or allergies since the last office visit           Review of Systems:    Review of Systems   Constitutional: Negative for activity change and diaphoresis  HENT: Negative for congestion, ear pain, facial swelling, hearing loss, tinnitus and trouble swallowing  Eyes: Negative for redness and itching  Respiratory: Negative for choking, shortness of breath and wheezing  Cardiovascular: Negative for leg swelling  Gastrointestinal: Negative for abdominal pain, anal bleeding and blood in stool  Endocrine: Negative for heat intolerance and polydipsia  Genitourinary: Negative for decreased urine volume, discharge, dysuria, frequency and penile pain  Musculoskeletal: Positive for neck pain  Joint stiffness   Skin: Negative for pallor and rash  Allergic/Immunologic: Negative for environmental allergies  Neurological: Negative for syncope, speech difficulty and light-headedness  Hematological: Negative for adenopathy  Psychiatric/Behavioral: Negative for agitation, decreased concentration and self-injury  The patient is not nervous/anxious            Patient Active Problem List   Diagnosis    Arthritis    CKD stage 1 due to type 2 diabetes mellitus ([facility] [address] )    Type 2 diabetes mellitus with stage 1 chronic kidney disease, without long-term current use of insulin (HCC)    Edema extremities    Hyperlipidemia LDL goal <100    Low HDL (under 40)    Localized, primary osteoarthritis    Osteoarthritis of knee    Tear of meniscus of knee    Colon cancer screening    Chronic pain syndrome    Cervical spondylosis without myelopathy    Cervicalgia       Past Medical History:   Diagnosis Date    Benign essential hypertension     LAST ASSESSED 5/1/17; RESOLVED 5/1/17    Chronic pain disorder     Controlled insulin dependent diabetes mellitus ([address] )     LAST ASSESSED 4/1/15; RESOLVED 5/2/16    Hyperlipidemia     Low back pain     Neck pain     Osteoarthritis     Pericarditis     Type 2 diabetes mellitus ([address] )        Past Surgical History:   Procedure Laterality Date    KNEE SURGERY      NERVE BLOCK Bilateral 7/19/2019    Procedure: C3 C4 C5 C6 Medial Branch Block #1 ([id number]); Surgeon: Magno Jo MD;  Location: [facility] MAIN OR;  Service: Pain Management     NERVE BLOCK Bilateral 8/2/2019    Procedure: C3 C4 C5 C6 Medial Branch Block #2 ([id number]);   Surgeon: Magno Jo MD;  Location: [facility] MAIN OR;  Service: Pain Management     RADIOFREQUENCY ABLATION Right 8/16/2019    Procedure: C3 C4 C5 C6 RADIOFREQUENCY ABLATION;  Surgeon: Magno Jo MD;  Location: [place] [address] MAIN OR;  Service: Pain Management     RADIOFREQUENCY ABLATION Left 8/30/2019    Procedure: C3 C4 C5 C6 RADIOFREQUENCY ABLATION;  Surgeon: Magno Jo MD;  Location: [place] [address] MAIN OR;  Service: Pain Management     REPLACEMENT TOTAL KNEE Right     WRIST SURGERY         Family History   Problem Relation Age of Onset    Cancer Family     Cancer Mother     No Known Problems Sister     No Known Problems Brother     No Known Problems Maternal Aunt     No Known Problems Maternal Uncle     No Known Problems Paternal Aunt     No Known Problems Paternal Uncle     No Known Problems Maternal Grandmother     No Known Problems Maternal Grandfather     No Known Problems Paternal Grandmother     No Known Problems Paternal Grandfather        Social History     Occupational History    Not on file   Tobacco Use    Smoking status: Never Smoker    Smokeless tobacco: Never Used   Substance and Sexual Activity    Alcohol use: No    Drug use: Never    Sexual activity: Not on file         Current Outpatient Medications:     glimepiride (AMARYL) 2 mg tablet, Take 1 tablet (2 mg total) by mouth daily with breakfast, Disp: 30 tablet, Rfl: 5    glucose blood (ONETOUCH VERIO) test strip, Use as instructed 1x/d [E11 20], Disp: 100 each, Rfl: 1    lidocaine (LIDODERM) 5 %, Apply 1 patch topically daily Remove & Discard patch within 12 hours or as directed by MD, Disp: 15 patch, Rfl: 0    lisinopril (ZESTRIL) 10 mg tablet, TAKE ONE TABLET BY MOUTH EVERY DAY, Disp: 30 tablet, Rfl: 0    metFORMIN (GLUCOPHAGE) 1000 MG tablet, Take 1 tablet (1,000 mg total) by mouth 2 (two) times a day with meals, Disp: 60 tablet, Rfl: 5    methocarbamol (ROBAXIN) 750 mg tablet, Take 1 tablet (750 mg total) by mouth daily at bedtime as needed for muscle spasms, Disp: 30 tablet, Rfl: 2    methylPREDNISolone 4 MG tablet therapy pack, Use as directed on package, Disp: 21 each, Rfl: 0    ONE TOUCH LANCETS MISC, by Does not apply route daily Use as instructed 1x/d [E11 20], Disp: 100 each, Rfl: 3    pravastatin (PRAVACHOL) 20 mg tablet, Take 1 tablet (20 mg total) by mouth daily, Disp: 90 tablet, Rfl: 1    No Known Allergies    Physical Exam:    /95   Pulse 78   Ht 6' 1 5" (1 867 m)   Wt 104 kg (228 lb 6 4 oz)   BMI 29 73 kg/m²     Constitutional:normal, well developed, well nourished, alert, in no distress and non-toxic and no overt pain behavior    Eyes:anicteric  HEENT:grossly intact  Neck:supple, symmetric, trachea midline and no masses   Pulmonary:even and unlabored  Cardiovascular:No edema or pitting edema present  Skin:Normal without rashes or lesions and well hydrated  Psychiatric:Mood and affect appropriate  Neurologic:Cranial Nerves II-XII grossly intact  Musculoskeletal:normal

## 2019-11-21 NOTE — PATIENT INSTRUCTIONS
Complete the steroid and muscle relaxant as previously directed  You may take Tylenol for residual discomfort  Apply ice to your back for 20-30 minutes at a time, 3-4 times daily  You may apply heat for 20-30 minutes at a time, as needed for stiffness  Perform stretching exercises 2-3 times daily as reviewed in office  Follow up with your family doctor or spine doctor in 3-5 days if symptoms persist  Proceed to the ER if symptoms worsen

## 2019-11-21 NOTE — LETTER
November 21, 2019     Patient: Madison Delgado  YOB: 1960   Date of Visit: 11/21/2019       To Whom It May Concern: It is my medical opinion that Ric Arellano may return to work on 11/23/2019  If you have any questions or concerns, please don't hesitate to call           Sincerely,        Mariluz Romano PA-C

## 2020-04-08 DIAGNOSIS — E78.5 HYPERLIPIDEMIA LDL GOAL <100: ICD-10-CM

## 2020-04-08 RX ORDER — PRAVASTATIN SODIUM 20 MG
20 TABLET ORAL DAILY
Qty: 30 TABLET | Refills: 0 | Status: SHIPPED | OUTPATIENT
Start: 2020-04-08 | End: 2020-04-17 | Stop reason: SDUPTHER

## 2020-04-12 LAB
ALBUMIN SERPL-MCNC: 4.9 G/DL (ref 3.8–4.9)
ALBUMIN/CREAT UR: <4 MG/G CREAT (ref 0–29)
ALBUMIN/GLOB SERPL: 2.2 {RATIO} (ref 1.2–2.2)
ALP SERPL-CCNC: 81 IU/L (ref 39–117)
ALT SERPL-CCNC: 34 IU/L (ref 0–44)
AST SERPL-CCNC: 21 IU/L (ref 0–40)
BILIRUB SERPL-MCNC: 0.5 MG/DL (ref 0–1.2)
BUN SERPL-MCNC: 10 MG/DL (ref 8–27)
BUN/CREAT SERPL: 11 (ref 10–24)
CALCIUM SERPL-MCNC: 10 MG/DL (ref 8.6–10.2)
CHLORIDE SERPL-SCNC: 98 MMOL/L (ref 96–106)
CHOLEST SERPL-MCNC: 117 MG/DL (ref 100–199)
CO2 SERPL-SCNC: 23 MMOL/L (ref 20–29)
CREAT SERPL-MCNC: 0.95 MG/DL (ref 0.76–1.27)
CREAT UR-MCNC: 69.5 MG/DL
GLOBULIN SER-MCNC: 2.2 G/DL (ref 1.5–4.5)
GLUCOSE SERPL-MCNC: 153 MG/DL (ref 65–99)
HBA1C MFR BLD: 7.9 % (ref 4.8–5.6)
HDLC SERPL-MCNC: 40 MG/DL
LDLC SERPL CALC-MCNC: 54 MG/DL (ref 0–99)
MICROALBUMIN UR-MCNC: <3 UG/ML
MICRODELETION SYND BLD/T FISH: NORMAL
POTASSIUM SERPL-SCNC: 4.8 MMOL/L (ref 3.5–5.2)
PROT SERPL-MCNC: 7.1 G/DL (ref 6–8.5)
PSA SERPL-MCNC: 0.6 NG/ML (ref 0–4)
SL AMB EGFR AFRICAN AMERICAN: 100 ML/MIN/1.73
SL AMB EGFR NON AFRICAN AMERICAN: 87 ML/MIN/1.73
SODIUM SERPL-SCNC: 137 MMOL/L (ref 134–144)
TRIGL SERPL-MCNC: 116 MG/DL (ref 0–149)

## 2020-04-12 PROCEDURE — 3051F HG A1C>EQUAL 7.0%<8.0%: CPT | Performed by: FAMILY MEDICINE

## 2020-04-17 ENCOUNTER — TELEMEDICINE (OUTPATIENT)
Dept: FAMILY MEDICINE CLINIC | Facility: CLINIC | Age: 60
End: 2020-04-17
Payer: COMMERCIAL

## 2020-04-17 DIAGNOSIS — N18.1 TYPE 2 DIABETES MELLITUS WITH STAGE 1 CHRONIC KIDNEY DISEASE, WITHOUT LONG-TERM CURRENT USE OF INSULIN (HCC): Primary | ICD-10-CM

## 2020-04-17 DIAGNOSIS — M54.2 CERVICALGIA: ICD-10-CM

## 2020-04-17 DIAGNOSIS — E78.5 HYPERLIPIDEMIA LDL GOAL <100: ICD-10-CM

## 2020-04-17 DIAGNOSIS — E11.22 TYPE 2 DIABETES MELLITUS WITH STAGE 1 CHRONIC KIDNEY DISEASE, WITHOUT LONG-TERM CURRENT USE OF INSULIN (HCC): Primary | ICD-10-CM

## 2020-04-17 DIAGNOSIS — E66.9 OBESITY (BMI 30-39.9): ICD-10-CM

## 2020-04-17 DIAGNOSIS — E11.22 CKD STAGE 1 DUE TO TYPE 2 DIABETES MELLITUS (HCC): ICD-10-CM

## 2020-04-17 DIAGNOSIS — N18.1 CKD STAGE 1 DUE TO TYPE 2 DIABETES MELLITUS (HCC): ICD-10-CM

## 2020-04-17 PROCEDURE — 99214 OFFICE O/P EST MOD 30 MIN: CPT | Performed by: FAMILY MEDICINE

## 2020-04-17 PROCEDURE — 3066F NEPHROPATHY DOC TX: CPT | Performed by: FAMILY MEDICINE

## 2020-04-17 RX ORDER — PRAVASTATIN SODIUM 20 MG
20 TABLET ORAL DAILY
Qty: 90 TABLET | Refills: 1 | Status: SHIPPED | OUTPATIENT
Start: 2020-04-17 | End: 2020-07-07

## 2020-04-17 RX ORDER — METFORMIN HYDROCHLORIDE 500 MG/1
2000 TABLET, EXTENDED RELEASE ORAL DAILY
Qty: 360 TABLET | Refills: 1 | Status: SHIPPED | OUTPATIENT
Start: 2020-04-17 | End: 2021-05-04

## 2020-04-17 RX ORDER — GLIMEPIRIDE 2 MG/1
2 TABLET ORAL
Qty: 90 TABLET | Refills: 1 | Status: SHIPPED | OUTPATIENT
Start: 2020-04-17 | End: 2021-05-07 | Stop reason: SDUPTHER

## 2020-04-19 ENCOUNTER — NURSE TRIAGE (OUTPATIENT)
Dept: OTHER | Facility: OTHER | Age: 60
End: 2020-04-19

## 2020-04-19 ENCOUNTER — TELEMEDICINE (OUTPATIENT)
Dept: FAMILY MEDICINE CLINIC | Facility: CLINIC | Age: 60
End: 2020-04-19
Payer: COMMERCIAL

## 2020-04-19 DIAGNOSIS — E11.22 TYPE 2 DIABETES MELLITUS WITH STAGE 1 CHRONIC KIDNEY DISEASE, WITHOUT LONG-TERM CURRENT USE OF INSULIN (HCC): ICD-10-CM

## 2020-04-19 DIAGNOSIS — N18.1 TYPE 2 DIABETES MELLITUS WITH STAGE 1 CHRONIC KIDNEY DISEASE, WITHOUT LONG-TERM CURRENT USE OF INSULIN (HCC): ICD-10-CM

## 2020-04-19 DIAGNOSIS — N18.1 CKD STAGE 1 DUE TO TYPE 2 DIABETES MELLITUS (HCC): ICD-10-CM

## 2020-04-19 DIAGNOSIS — R50.9 FEVER, UNSPECIFIED FEVER CAUSE: Primary | ICD-10-CM

## 2020-04-19 DIAGNOSIS — E11.22 CKD STAGE 1 DUE TO TYPE 2 DIABETES MELLITUS (HCC): ICD-10-CM

## 2020-04-19 PROCEDURE — 99213 OFFICE O/P EST LOW 20 MIN: CPT | Performed by: FAMILY MEDICINE

## 2020-05-04 ENCOUNTER — TELEPHONE (OUTPATIENT)
Dept: FAMILY MEDICINE CLINIC | Facility: CLINIC | Age: 60
End: 2020-05-04

## 2020-05-06 ENCOUNTER — TELEMEDICINE (OUTPATIENT)
Dept: FAMILY MEDICINE CLINIC | Facility: CLINIC | Age: 60
End: 2020-05-06
Payer: COMMERCIAL

## 2020-05-06 DIAGNOSIS — E11.22 TYPE 2 DIABETES MELLITUS WITH STAGE 1 CHRONIC KIDNEY DISEASE, WITHOUT LONG-TERM CURRENT USE OF INSULIN (HCC): ICD-10-CM

## 2020-05-06 DIAGNOSIS — Z12.11 COLON CANCER SCREENING: ICD-10-CM

## 2020-05-06 DIAGNOSIS — N18.1 TYPE 2 DIABETES MELLITUS WITH STAGE 1 CHRONIC KIDNEY DISEASE, WITHOUT LONG-TERM CURRENT USE OF INSULIN (HCC): ICD-10-CM

## 2020-05-06 DIAGNOSIS — E78.5 HYPERLIPIDEMIA LDL GOAL <100: ICD-10-CM

## 2020-05-06 DIAGNOSIS — E11.22 CKD STAGE 1 DUE TO TYPE 2 DIABETES MELLITUS (HCC): ICD-10-CM

## 2020-05-06 DIAGNOSIS — N18.1 CKD STAGE 1 DUE TO TYPE 2 DIABETES MELLITUS (HCC): ICD-10-CM

## 2020-05-06 DIAGNOSIS — R19.7 DIARRHEA, UNSPECIFIED TYPE: Primary | ICD-10-CM

## 2020-05-06 PROCEDURE — 99214 OFFICE O/P EST MOD 30 MIN: CPT | Performed by: FAMILY MEDICINE

## 2020-06-08 DIAGNOSIS — E11.29 TYPE 2 DIABETES MELLITUS WITH OTHER DIABETIC KIDNEY COMPLICATION, WITHOUT LONG-TERM CURRENT USE OF INSULIN (HCC): ICD-10-CM

## 2020-06-08 PROCEDURE — 4010F ACE/ARB THERAPY RXD/TAKEN: CPT | Performed by: FAMILY MEDICINE

## 2020-06-08 RX ORDER — LISINOPRIL 10 MG/1
TABLET ORAL
Qty: 30 TABLET | Refills: 5 | Status: SHIPPED | OUTPATIENT
Start: 2020-06-08 | End: 2021-05-07 | Stop reason: SDUPTHER

## 2020-07-03 DIAGNOSIS — E78.5 HYPERLIPIDEMIA LDL GOAL <100: ICD-10-CM

## 2020-07-07 RX ORDER — PRAVASTATIN SODIUM 20 MG
TABLET ORAL
Qty: 90 TABLET | Refills: 1 | Status: SHIPPED | OUTPATIENT
Start: 2020-07-07 | End: 2021-05-07 | Stop reason: SDUPTHER

## 2020-07-14 ENCOUNTER — TELEPHONE (OUTPATIENT)
Dept: FAMILY MEDICINE CLINIC | Facility: CLINIC | Age: 60
End: 2020-07-14

## 2020-07-14 NOTE — TELEPHONE ENCOUNTER
Pt wants a covid test, he has no symptoms but his son is quarantined because he was exposed at work and now his work has quarantined him too and if he doesn't work he doesn't get paid, please call to ask more questions

## 2020-10-20 ENCOUNTER — TELEPHONE (OUTPATIENT)
Dept: GASTROENTEROLOGY | Facility: CLINIC | Age: 60
End: 2020-10-20

## 2020-11-01 ENCOUNTER — OFFICE VISIT (OUTPATIENT)
Dept: URGENT CARE | Facility: CLINIC | Age: 60
End: 2020-11-01
Payer: COMMERCIAL

## 2020-11-01 VITALS
SYSTOLIC BLOOD PRESSURE: 139 MMHG | TEMPERATURE: 96 F | RESPIRATION RATE: 18 BRPM | HEIGHT: 73 IN | BODY MASS INDEX: 30.75 KG/M2 | DIASTOLIC BLOOD PRESSURE: 83 MMHG | WEIGHT: 232 LBS | OXYGEN SATURATION: 96 % | HEART RATE: 62 BPM

## 2020-11-01 DIAGNOSIS — M54.9 ACUTE LEFT-SIDED BACK PAIN, UNSPECIFIED BACK LOCATION: Primary | ICD-10-CM

## 2020-11-01 PROCEDURE — 99213 OFFICE O/P EST LOW 20 MIN: CPT | Performed by: PHYSICIAN ASSISTANT

## 2020-11-01 RX ORDER — NAPROXEN 500 MG/1
500 TABLET ORAL 2 TIMES DAILY WITH MEALS
Qty: 20 TABLET | Refills: 0 | Status: SHIPPED | OUTPATIENT
Start: 2020-11-01 | End: 2021-05-07

## 2020-11-01 RX ORDER — METHOCARBAMOL 750 MG/1
750 TABLET, FILM COATED ORAL
Qty: 15 TABLET | Refills: 0 | Status: SHIPPED | OUTPATIENT
Start: 2020-11-01 | End: 2021-05-07

## 2020-11-01 RX ORDER — METHOCARBAMOL 750 MG/1
750 TABLET, FILM COATED ORAL
Qty: 15 TABLET | Refills: 2 | Status: SHIPPED | OUTPATIENT
Start: 2020-11-01 | End: 2020-11-01

## 2021-03-03 ENCOUNTER — IMMUNIZATIONS (OUTPATIENT)
Dept: FAMILY MEDICINE CLINIC | Facility: HOSPITAL | Age: 61
End: 2021-03-03

## 2021-03-03 DIAGNOSIS — Z23 ENCOUNTER FOR IMMUNIZATION: Primary | ICD-10-CM

## 2021-03-03 PROCEDURE — 91301 SARS-COV-2 / COVID-19 MRNA VACCINE (MODERNA) 100 MCG: CPT

## 2021-03-03 PROCEDURE — 0011A SARS-COV-2 / COVID-19 MRNA VACCINE (MODERNA) 100 MCG: CPT

## 2021-03-20 LAB
LEFT EYE DIABETIC RETINOPATHY: NORMAL
RIGHT EYE DIABETIC RETINOPATHY: NORMAL

## 2021-04-02 ENCOUNTER — IMMUNIZATIONS (OUTPATIENT)
Dept: FAMILY MEDICINE CLINIC | Facility: HOSPITAL | Age: 61
End: 2021-04-02

## 2021-04-02 DIAGNOSIS — Z23 ENCOUNTER FOR IMMUNIZATION: Primary | ICD-10-CM

## 2021-04-02 PROCEDURE — 0012A SARS-COV-2 / COVID-19 MRNA VACCINE (MODERNA) 100 MCG: CPT

## 2021-04-02 PROCEDURE — 91301 SARS-COV-2 / COVID-19 MRNA VACCINE (MODERNA) 100 MCG: CPT

## 2021-04-28 DIAGNOSIS — E11.22 TYPE 2 DIABETES MELLITUS WITH STAGE 1 CHRONIC KIDNEY DISEASE, WITHOUT LONG-TERM CURRENT USE OF INSULIN (HCC): ICD-10-CM

## 2021-04-28 DIAGNOSIS — N18.1 TYPE 2 DIABETES MELLITUS WITH STAGE 1 CHRONIC KIDNEY DISEASE, WITHOUT LONG-TERM CURRENT USE OF INSULIN (HCC): ICD-10-CM

## 2021-05-03 ENCOUNTER — TELEPHONE (OUTPATIENT)
Dept: OBGYN CLINIC | Facility: HOSPITAL | Age: 61
End: 2021-05-03

## 2021-05-03 NOTE — TELEPHONE ENCOUNTER
Sonja Cam, patients wife is calling to schedule patient for his knee pain but has [employer]  I could not locate in our insurance guide  She was given tax id # & will call them to see if we par

## 2021-05-04 RX ORDER — METFORMIN HYDROCHLORIDE 500 MG/1
TABLET, EXTENDED RELEASE ORAL
Qty: 360 TABLET | Refills: 1 | Status: SHIPPED | OUTPATIENT
Start: 2021-05-04 | End: 2021-05-07 | Stop reason: SDUPTHER

## 2021-05-05 ENCOUNTER — APPOINTMENT (OUTPATIENT)
Dept: RADIOLOGY | Facility: CLINIC | Age: 61
End: 2021-05-05
Payer: COMMERCIAL

## 2021-05-05 ENCOUNTER — OFFICE VISIT (OUTPATIENT)
Dept: OBGYN CLINIC | Facility: CLINIC | Age: 61
End: 2021-05-05
Payer: COMMERCIAL

## 2021-05-05 VITALS
HEART RATE: 90 BPM | SYSTOLIC BLOOD PRESSURE: 150 MMHG | HEIGHT: 73 IN | BODY MASS INDEX: 31.17 KG/M2 | DIASTOLIC BLOOD PRESSURE: 90 MMHG | WEIGHT: 235.2 LBS

## 2021-05-05 DIAGNOSIS — M25.562 LEFT KNEE PAIN, UNSPECIFIED CHRONICITY: ICD-10-CM

## 2021-05-05 DIAGNOSIS — Z01.89 ENCOUNTER FOR LOWER EXTREMITY COMPARISON IMAGING STUDY: ICD-10-CM

## 2021-05-05 DIAGNOSIS — M17.12 PRIMARY OSTEOARTHRITIS OF LEFT KNEE: Primary | ICD-10-CM

## 2021-05-05 DIAGNOSIS — M25.562 ACUTE PAIN OF LEFT KNEE: ICD-10-CM

## 2021-05-05 PROCEDURE — 73562 X-RAY EXAM OF KNEE 3: CPT

## 2021-05-05 PROCEDURE — 73560 X-RAY EXAM OF KNEE 1 OR 2: CPT

## 2021-05-05 PROCEDURE — 20610 DRAIN/INJ JOINT/BURSA W/O US: CPT | Performed by: ORTHOPAEDIC SURGERY

## 2021-05-05 PROCEDURE — 99214 OFFICE O/P EST MOD 30 MIN: CPT | Performed by: ORTHOPAEDIC SURGERY

## 2021-05-05 RX ORDER — BUPIVACAINE HYDROCHLORIDE 5 MG/ML
6 INJECTION, SOLUTION EPIDURAL; INTRACAUDAL
Status: COMPLETED | OUTPATIENT
Start: 2021-05-05 | End: 2021-05-05

## 2021-05-05 RX ORDER — TRIAMCINOLONE ACETONIDE 40 MG/ML
80 INJECTION, SUSPENSION INTRA-ARTICULAR; INTRAMUSCULAR
Status: COMPLETED | OUTPATIENT
Start: 2021-05-05 | End: 2021-05-05

## 2021-05-05 RX ADMIN — TRIAMCINOLONE ACETONIDE 80 MG: 40 INJECTION, SUSPENSION INTRA-ARTICULAR; INTRAMUSCULAR at 10:53

## 2021-05-05 RX ADMIN — BUPIVACAINE HYDROCHLORIDE 6 ML: 5 INJECTION, SOLUTION EPIDURAL; INTRACAUDAL at 10:53

## 2021-05-05 NOTE — LETTER
May 5, 2021     Patient: Laurent Martin YOB: 1960   Date of Visit: 5/5/2021       To Whom it May Concern:    Yakov Suarez is under my professional care  He was seen in my office on 5/5/2021  He may return to work on 5/6/2021  If you have any questions or concerns, please don't hesitate to call  Sincerely,          Teagan Gomes DO        CC: Gallito Hernandez

## 2021-05-05 NOTE — PROGRESS NOTES
Assessment/Plan:  1  Primary osteoarthritis of left knee     2  Acute pain of left knee  XR knee 3 vw left non injury     Scribe Attestation    I,:  Lisy He am acting as a scribe while in the presence of the attending physician :       I,:  Gege Guthrie, DO personally performed the services described in this documentation    as scribed in my presence :         Shawn Wei is a pleasant 63year old who presents to the office today for an initial evaluation of his left knee pain  Upon evaluation and review of today his x-ray's, he has clinical findings consistent with  moderate medial compartment osteoarthritis of his left knee  I discussed with the patient that since he lacks full range of motion of his right knee status post right total knee arthroplasty, his gait is off due to an abnormality  Non operative treatments were discussed in the forms of corticosteroid injection and gel injection  Since he did see or positive results from previous steroid injections in the past, I offered him a left knee corticosteroid injection at today's visit  We discussed the risks and benefits of corticosteroid injection today in the office and he did elect to proceed  The left knee injection was administered without issue and well tolerated by the patient  Post injection instructions were provided  He understands can be repeated no sooner than three months  A work note was provided to him at today's visit to return to work tomorrow 05/06/2021  I will follow-up with him in 3 months for a clinical re-evaluation  He understood and had no further questions  Large joint arthrocentesis: L knee  Universal Protocol:  Consent: Verbal consent obtained    Risks and benefits: risks, benefits and alternatives were discussed  Consent given by: patient  Site marked: the operative site was marked  Supporting Documentation  Indications: pain   Procedure Details  Location: knee - L knee  Needle size: 20 G  Ultrasound guidance: no  Approach: anterolateral  Medications administered: 6 mL bupivacaine (PF) 0 5 %; 80 mg triamcinolone acetonide 40 mg/mL    Patient tolerance: patient tolerated the procedure well with no immediate complications  Dressing:  Sterile dressing applied      Subjective: Initial Evaluation Left Knee    Patient ID: Gilberto Ruiz  is a 63 y o  Bee Tony presents to the office today for an initial evaluation of his left knee pain  He states that his left knee pain started on Friday with no known mechanism of injury  He states that  He is on his feet at least 10 hours per day at work and walks at least 6 miles  During the course of the day  He states that he will experience sharp anterior knee pain with any type of twisting and ambulating up and down stairs  He will experience a dull pain while walking  He denies any instability or locking  He states that his pain is located over the medial aspect of his left knee  Has been using Advil, heat, Voltaren gel, CBD oils to help alleviate his pain with minimal relief  He states that he is received previous left knee corticosteroid injections into his left knee with the last one being 2 years ago by Dr Edel Simons  He states that he received good pain relief from previous corticosteroid injection  He denies any previous injury  Review of Systems   Constitutional: Negative for appetite change and unexpected weight change  HENT: Negative for congestion and trouble swallowing  Eyes: Negative for visual disturbance  Respiratory: Negative for cough and shortness of breath  Cardiovascular: Negative for chest pain and palpitations  Gastrointestinal: Negative for nausea and vomiting  Endocrine: Negative for cold intolerance and heat intolerance  Genitourinary: Negative  Musculoskeletal: Positive for arthralgias and gait problem  Negative for myalgias  Skin: Negative for rash  Allergic/Immunologic: Negative  Neurological: Negative for numbness     Hematological: Negative  Psychiatric/Behavioral: Negative  Past Medical History:   Diagnosis Date    Benign essential hypertension     LAST ASSESSED 5/1/17; RESOLVED 5/1/17    Chronic pain disorder     Controlled insulin dependent diabetes mellitus     LAST ASSESSED 4/1/15; RESOLVED 5/2/16    Hyperlipidemia     Low back pain     Neck pain     Osteoarthritis     Pericarditis     Type 2 diabetes mellitus ([facility] [address] )        Past Surgical History:   Procedure Laterality Date    KNEE SURGERY      NERVE BLOCK Bilateral 7/19/2019    Procedure: C3 C4 C5 C6 Medial Branch Block #1 ([id number]); Surgeon: Oliva Mckenzie MD;  Location: [facility] MAIN OR;  Service: Pain Management     NERVE BLOCK Bilateral 8/2/2019    Procedure: C3 C4 C5 C6 Medial Branch Block #2 ([id number]);   Surgeon: Oliva Mckenzie MD;  Location: [facility] MAIN OR;  Service: Pain Management     RADIOFREQUENCY ABLATION Right 8/16/2019    Procedure: C3 C4 C5 C6 RADIOFREQUENCY ABLATION;  Surgeon: Oliva Mckenzie MD;  Location: [facility] MAIN OR;  Service: Pain Management     RADIOFREQUENCY ABLATION Left 8/30/2019    Procedure: C3 C4 C5 C6 RADIOFREQUENCY ABLATION;  Surgeon: Oliva Mckenzie MD;  Location: [facility] MAIN OR;  Service: Pain Management     REPLACEMENT TOTAL KNEE Right     WRIST SURGERY         Family History   Problem Relation Age of Onset    Cancer Family     Cancer Mother     No Known Problems Sister     No Known Problems Brother     No Known Problems Maternal Aunt     No Known Problems Maternal Uncle     No Known Problems Paternal Aunt     No Known Problems Paternal Uncle     No Known Problems Maternal Grandmother     No Known Problems Maternal Grandfather     No Known Problems Paternal Grandmother     No Known Problems Paternal Grandfather        Social History     Occupational History    Not on file   Tobacco Use    Smoking status: Never Smoker    Smokeless tobacco: Never Used   Substance and Sexual Activity    Alcohol use: No    Drug use: Never    Sexual activity: Not on file         Current Outpatient Medications:     glimepiride (AMARYL) 2 mg tablet, Take 1 tablet (2 mg total) by mouth daily with breakfast, Disp: 90 tablet, Rfl: 1    glucose blood (ONETOUCH VERIO) test strip, Use as instructed 1x/d [E11 20], Disp: 100 each, Rfl: 1    lidocaine (LIDODERM) 5 %, Apply 1 patch topically daily Remove & Discard patch within 12 hours or as directed by MD, Disp: 15 patch, Rfl: 0    lisinopril (ZESTRIL) 10 mg tablet, TAKE ONE TABLET BY MOUTH EVERY DAY, Disp: 30 tablet, Rfl: 5    metFORMIN (GLUCOPHAGE-XR) 500 mg 24 hr tablet, TAKE FOUR TABLETS BY MOUTH EVERY DAY (GENERIC FOR GLUCOPHAGE XR), Disp: 360 tablet, Rfl: 1    methocarbamol (ROBAXIN) 750 mg tablet, Take 1 tablet (750 mg total) by mouth daily at bedtime as needed for muscle spasms, Disp: 15 tablet, Rfl: 0    naproxen (EC NAPROSYN) 500 MG EC tablet, Take 1 tablet (500 mg total) by mouth 2 (two) times a day with meals, Disp: 20 tablet, Rfl: 0    ONE TOUCH LANCETS MISC, by Does not apply route daily Use as instructed 1x/d [E11 20], Disp: 100 each, Rfl: 3    pravastatin (PRAVACHOL) 20 mg tablet, TAKE ONE TABLET BY MOUTH EVERY DAY, Disp: 90 tablet, Rfl: 1    No Known Allergies    Objective:  Vitals:    05/05/21 0951   BP: 150/90   Pulse: 90       Body mass index is 31 03 kg/m²  Left Knee Exam     Tenderness   The patient is experiencing tenderness in the medial joint line  Range of Motion   Extension: 0   Flexion: 120     Tests   Varus: negative Valgus: negative  Drawer:  Anterior - negative       Patellar apprehension: negative    Other   Erythema: absent  Scars: absent  Sensation: normal  Pulse: present  Swelling: none  Effusion: no effusion present    Comments:  Stable knee on ligamentous exam at 0, 30, 90 degrees  Peripatellar Crepitus            Observations   Left Knee   Negative for effusion  Physical Exam  Vitals signs and nursing note reviewed  Constitutional:       Appearance: He is well-developed  HENT:      Head: Normocephalic and atraumatic  Eyes:      Conjunctiva/sclera: Conjunctivae normal       Pupils: Pupils are equal, round, and reactive to light  Neck:      Musculoskeletal: Normal range of motion and neck supple  Cardiovascular:      Rate and Rhythm: Normal rate  Pulmonary:      Effort: Pulmonary effort is normal  No respiratory distress  Musculoskeletal:      Left knee: He exhibits no effusion  Comments: As noted in HPI   Skin:     General: Skin is warm and dry  Neurological:      Mental Status: He is alert and oriented to person, place, and time  Psychiatric:         Behavior: Behavior normal            I have personally reviewed pertinent films in PACS  X-ray's taken today of his left knee demonstrates moderate medial compartment osteoarthritis  There is evidence of joint space narrowing, sclerosis, osteophytes and subchondral cyst  There is no acute fracture, dislocation, lytic or blastic lesion

## 2021-05-06 PROBLEM — R19.7 DIARRHEA: Status: RESOLVED | Noted: 2020-05-06 | Resolved: 2021-05-06

## 2021-05-06 PROBLEM — N18.2 CKD STAGE 2 DUE TO TYPE 2 DIABETES MELLITUS (HCC): Status: ACTIVE | Noted: 2017-01-29

## 2021-05-06 PROBLEM — R50.9 FEVER: Status: RESOLVED | Noted: 2020-04-19 | Resolved: 2021-05-06

## 2021-05-07 ENCOUNTER — TELEPHONE (OUTPATIENT)
Dept: ADMINISTRATIVE | Facility: OTHER | Age: 61
End: 2021-05-07

## 2021-05-07 ENCOUNTER — OFFICE VISIT (OUTPATIENT)
Dept: FAMILY MEDICINE CLINIC | Facility: CLINIC | Age: 61
End: 2021-05-07
Payer: COMMERCIAL

## 2021-05-07 VITALS
WEIGHT: 232 LBS | OXYGEN SATURATION: 96 % | HEART RATE: 70 BPM | BODY MASS INDEX: 30.75 KG/M2 | TEMPERATURE: 96.8 F | HEIGHT: 73 IN | RESPIRATION RATE: 16 BRPM | SYSTOLIC BLOOD PRESSURE: 126 MMHG | DIASTOLIC BLOOD PRESSURE: 80 MMHG

## 2021-05-07 DIAGNOSIS — Z12.11 COLON CANCER SCREENING: ICD-10-CM

## 2021-05-07 DIAGNOSIS — E78.5 HYPERLIPIDEMIA LDL GOAL <100: ICD-10-CM

## 2021-05-07 DIAGNOSIS — E11.22 TYPE 2 DIABETES MELLITUS WITH STAGE 2 CHRONIC KIDNEY DISEASE, WITHOUT LONG-TERM CURRENT USE OF INSULIN (HCC): Primary | ICD-10-CM

## 2021-05-07 DIAGNOSIS — Z23 IMMUNIZATION DUE: ICD-10-CM

## 2021-05-07 DIAGNOSIS — L02.93 CARBUNCLE: ICD-10-CM

## 2021-05-07 DIAGNOSIS — N18.2 TYPE 2 DIABETES MELLITUS WITH STAGE 2 CHRONIC KIDNEY DISEASE, WITHOUT LONG-TERM CURRENT USE OF INSULIN (HCC): Primary | ICD-10-CM

## 2021-05-07 DIAGNOSIS — N18.2 CKD STAGE 2 DUE TO TYPE 2 DIABETES MELLITUS (HCC): ICD-10-CM

## 2021-05-07 DIAGNOSIS — Z12.5 PROSTATE CANCER SCREENING: ICD-10-CM

## 2021-05-07 DIAGNOSIS — E11.22 CKD STAGE 2 DUE TO TYPE 2 DIABETES MELLITUS (HCC): ICD-10-CM

## 2021-05-07 PROCEDURE — 90715 TDAP VACCINE 7 YRS/> IM: CPT

## 2021-05-07 PROCEDURE — 99214 OFFICE O/P EST MOD 30 MIN: CPT | Performed by: FAMILY MEDICINE

## 2021-05-07 PROCEDURE — 90471 IMMUNIZATION ADMIN: CPT

## 2021-05-07 RX ORDER — GLIMEPIRIDE 2 MG/1
2 TABLET ORAL
Qty: 90 TABLET | Refills: 1 | Status: SHIPPED | OUTPATIENT
Start: 2021-05-07 | End: 2021-06-11 | Stop reason: SDUPTHER

## 2021-05-07 RX ORDER — METFORMIN HYDROCHLORIDE 500 MG/1
2000 TABLET, EXTENDED RELEASE ORAL
Qty: 360 TABLET | Refills: 1 | Status: SHIPPED | OUTPATIENT
Start: 2021-05-07

## 2021-05-07 RX ORDER — CEPHALEXIN 500 MG/1
500 CAPSULE ORAL 3 TIMES DAILY
Qty: 30 CAPSULE | Refills: 0 | Status: SHIPPED | OUTPATIENT
Start: 2021-05-07 | End: 2021-05-12 | Stop reason: ALTCHOICE

## 2021-05-07 RX ORDER — PRAVASTATIN SODIUM 20 MG
20 TABLET ORAL DAILY
Qty: 90 TABLET | Refills: 1 | Status: SHIPPED | OUTPATIENT
Start: 2021-05-07

## 2021-05-07 RX ORDER — LISINOPRIL 10 MG/1
10 TABLET ORAL DAILY
Qty: 90 TABLET | Refills: 1 | Status: SHIPPED | OUTPATIENT
Start: 2021-05-07

## 2021-05-07 NOTE — LETTER
Diabetic Eye Exam Form    Date Requested: 21  Patient: Corrine Choudhary Sr   Patient : 1960   Referring Provider: Tigist Gilman,     Dilated Retinal Exam, Optomap-Iris Exam, or Fundus Photography Done         Yes ([place] one above)         No     Date of Diabetic Eye Exam ______________________________  Left Eye      Exam did show retinopathy    Exam did not show retinopathy         Mild       Moderate       None       Proliferative       Severe     Right Eye     Exam did show retinopathy    Exam did not show retinopathy         Mild       Moderate       None       Proliferative       Severe     Comments __________________________________________________________    Practice Providing Exam ______________________________________________    Exam Performed By (print name) _______________________________________      Provider Signature ___________________________________________________      These reports are needed for  compliance  Please fax this completed form and a copy of the Diabetic Eye Exam report to our office located at [place] [address] as soon as possible to [phone] makenna Douglas: Phone [phone]    We thank you for your assistance in treating our mutual patient

## 2021-05-07 NOTE — TELEPHONE ENCOUNTER
Upon review of the In Basket request and the patient's chart, initial outreach has been made via fax, please see Contacts section for details       Thank you  Serge Wiseman, [id number] [phone] Fax [phone]

## 2021-05-07 NOTE — TELEPHONE ENCOUNTER
----- Message from Michael Boothe MA sent at 5/7/2021  9:20 AM EDT -----  Regarding: Diabetic Eye Exam  05/07/21 9:20 AM    Hello, our patient Leo Luevano  has had Diabetic Eye Exam completed/performed  Please assist in updating the patient chart by making an External outreach to [facility] located in [place], [place], [phone]   The date of service is within the last 2 months       Thank you,  Michael Boothe MA  [facility]

## 2021-05-07 NOTE — PROGRESS NOTES
Assessment/Plan:    No problem-specific Assessment & Plan notes found for this encounter  DM2 unchanged, pt understands needs f/u q3m  Risks of meds and complications of DM advised/uknderstood  HLD, continue statin for risk reduction    Left arm carbuncle with cellulitis, warm compresses, cephalexin, f/u if no better     Diagnoses and all orders for this visit:    Type 2 diabetes mellitus with stage 2 chronic kidney disease, without long-term current use of insulin ([facility])  -     Comprehensive metabolic panel; Future  -     Hemoglobin A1C; Future  -     Microalbumin / creatinine urine ratio; Future  -     glimepiride (AMARYL) 2 mg tablet; Take 1 tablet (2 mg total) by mouth daily with breakfast  -     metFORMIN (GLUCOPHAGE-XR) 500 mg 24 hr tablet; Take 4 tablets (2,000 mg total) by mouth daily with breakfast  -     lisinopril (ZESTRIL) 10 mg tablet; Take 1 tablet (10 mg total) by mouth daily    CKD stage 2 due to type 2 diabetes mellitus ([email or web address] )    Hyperlipidemia LDL goal <100  -     Lipid Panel with Direct LDL reflex; Future  -     pravastatin (PRAVACHOL) 20 mg tablet; Take 1 tablet (20 mg total) by mouth daily    Colon cancer screening  -     Ambulatory referral to Gastroenterology; Future    Prostate cancer screening  -     PSA, Total Screen; Future    Immunization due  -     TDAP VACCINE GREATER THAN OR EQUAL TO 6YO IM    Carbuncle  -     cephalexin (KEFLEX) 500 mg capsule; Take 1 capsule (500 mg total) by mouth 3 (three) times a day for 10 days          Return in about 2 weeks (around 5/21/2021) for Annual physical     Subjective:      Patient ID: Paula Fink  is a 63 y o  male      Chief Complaint   Patient presents with    Follow-up     follow up on diabetes, jlopezcma     Insect Bite     possible insect bite on left arm, jlopezcma        HPI  q3m f/u dm advised again  Less stress currently with new job  Ran out of DM meds    Watching carbs  Not counting calories  No exercise program    Off dm meds for 2w    Left arm bug bite? Not sure  Red  4d  Some pus  Itchy  No f/c  No hand stiffness    The following portions of the patient's history were reviewed and updated as appropriate: allergies, current medications, past family history, past medical history, past social history, past surgical history and problem list     Review of Systems   Constitutional: Negative for fever  Respiratory: Negative for shortness of breath  Current Outpatient Medications   Medication Sig Dispense Refill    glimepiride (AMARYL) 2 mg tablet Take 1 tablet (2 mg total) by mouth daily with breakfast 90 tablet 1    glucose blood (ONETOUCH VERIO) test strip Use as instructed 1x/d [E11 20] 100 each 1    lidocaine (LIDODERM) 5 % Apply 1 patch topically daily Remove & Discard patch within 12 hours or as directed by MD 15 patch 0    lisinopril (ZESTRIL) 10 mg tablet Take 1 tablet (10 mg total) by mouth daily 90 tablet 1    metFORMIN (GLUCOPHAGE-XR) 500 mg 24 hr tablet Take 4 tablets (2,000 mg total) by mouth daily with breakfast 360 tablet 1    ONE TOUCH LANCETS MISC by Does not apply route daily Use as instructed 1x/d [E11 20] 100 each 3    pravastatin (PRAVACHOL) 20 mg tablet Take 1 tablet (20 mg total) by mouth daily 90 tablet 1    cephalexin (KEFLEX) 500 mg capsule Take 1 capsule (500 mg total) by mouth 3 (three) times a day for 10 days 30 capsule 0     No current facility-administered medications for this visit  Objective:    /80   Pulse 70   Temp (!) 96 8 °F (36 °C)   Resp 16   Ht 6' 1" (1 854 m)   Wt 105 kg (232 lb)   SpO2 96%   BMI 30 61 kg/m²        Physical Exam  Vitals signs and nursing note reviewed  Constitutional:       Appearance: He is well-developed  He is not ill-appearing  HENT:      Head: Normocephalic  Right Ear: Tympanic membrane normal       Left Ear: Tympanic membrane normal    Eyes:      General: No scleral icterus       Conjunctiva/sclera: Conjunctivae normal    Neck: Musculoskeletal: Neck supple  Cardiovascular:      Rate and Rhythm: Normal rate and regular rhythm  Pulses: no weak pulses          Dorsalis pedis pulses are 2+ on the right side and 2+ on the left side  Heart sounds: No murmur  Pulmonary:      Effort: Pulmonary effort is normal  No respiratory distress  Abdominal:      Palpations: Abdomen is soft  Musculoskeletal:         General: No deformity  Skin:     General: Skin is warm and dry  Coloration: Skin is not pale  Neurological:      Mental Status: He is alert  Motor: No weakness  Gait: Gait normal    Psychiatric:         Behavior: Behavior normal          Thought Content: Thought content normal        BMI Counseling: Body mass index is 30 61 kg/m²  The BMI is above normal  Nutrition recommendations include decreasing portion sizes and moderation in carbohydrate intake  Exercise recommendations include exercising 3-5 times per week  No pharmacotherapy was ordered  Diabetic Foot Exam    Patient's shoes and socks removed  Right Foot/Ankle   Right Foot Inspection  Skin Exam: skin not intact and no abnormal color                            Sensory       Monofilament testing: intact  Vascular    The right DP pulse is 2+  Left Foot/Ankle  Left Foot Inspection  Skin Exam: skin not intact and normal color                                         Sensory       Monofilament: intact  Vascular    The left DP pulse is 2+  Assign Risk Category:  No deformity present; No loss of protective sensation;  No weak pulses       Risk: 0           Cory Ferguson, DO

## 2021-05-09 ENCOUNTER — OFFICE VISIT (OUTPATIENT)
Dept: URGENT CARE | Facility: CLINIC | Age: 61
End: 2021-05-09
Payer: COMMERCIAL

## 2021-05-09 VITALS
TEMPERATURE: 97 F | RESPIRATION RATE: 16 BRPM | SYSTOLIC BLOOD PRESSURE: 160 MMHG | WEIGHT: 234 LBS | DIASTOLIC BLOOD PRESSURE: 100 MMHG | OXYGEN SATURATION: 97 % | BODY MASS INDEX: 30.03 KG/M2 | HEART RATE: 74 BPM | HEIGHT: 74 IN

## 2021-05-09 DIAGNOSIS — T63.301D SPIDER BITE WOUND, ACCIDENTAL OR UNINTENTIONAL, SUBSEQUENT ENCOUNTER: Primary | ICD-10-CM

## 2021-05-09 PROCEDURE — 99243 OFF/OP CNSLTJ NEW/EST LOW 30: CPT | Performed by: PHYSICIAN ASSISTANT

## 2021-05-09 RX ORDER — SULFAMETHOXAZOLE AND TRIMETHOPRIM 800; 160 MG/1; MG/1
1 TABLET ORAL EVERY 12 HOURS SCHEDULED
Qty: 14 TABLET | Refills: 0 | Status: SHIPPED | OUTPATIENT
Start: 2021-05-09 | End: 2021-05-12 | Stop reason: SDUPTHER

## 2021-05-09 NOTE — PROGRESS NOTES
[facility]        NAME: Korey Sultana is a 63 y o  male  : 1960    MRN: [id number]  DATE: May 9, 2021  TIME: 10:37 AM    Assessment and Plan   Spider bite wound, accidental or unintentional, subsequent encounter [T63 301D]  1  Spider bite wound, accidental or unintentional, subsequent encounter  sulfamethoxazole-trimethoprim (BACTRIM DS) 800-160 mg per tablet    mupirocin (BACTROBAN) 2 % ointment         Patient Instructions     Patient Instructions     He is on Keflex but not improving  We will start with Bactrim and mupirocin  Continue warm compresses  Follow up with PCP in 2-3 days  If symptoms are worse go to the ER  Follow up with PCP in 3-5 days  Proceed to  ER if symptoms worsen  Chief Complaint     Chief Complaint   Patient presents with    Abscess     Pt reports of worsening left arm abscess  History of Present Illness         58-year-old male presents with left forearm wound for last 3-4 days  He saw his family doctor was started on Keflex  It is gotten worse  He was putting drawing salve on it  He has some scant drainage  Very firm and red  Unsure of injury  Had a tetanus shot on Friday  Review of Systems   Review of Systems   Constitutional: Negative  HENT: Negative  Respiratory: Negative  Cardiovascular: Negative  Gastrointestinal: Negative  Skin: Positive for wound           Current Medications       Current Outpatient Medications:     cephalexin (KEFLEX) 500 mg capsule, Take 1 capsule (500 mg total) by mouth 3 (three) times a day for 10 days, Disp: 30 capsule, Rfl: 0    glimepiride (AMARYL) 2 mg tablet, Take 1 tablet (2 mg total) by mouth daily with breakfast, Disp: 90 tablet, Rfl: 1    glucose blood (ONETOUCH VERIO) test strip, Use as instructed 1x/d [E11 20], Disp: 100 each, Rfl: 1    lidocaine (LIDODERM) 5 %, Apply 1 patch topically daily Remove & Discard patch within 12 hours or as directed by MD, Disp: 15 patch, Rfl: 0    lisinopril (ZESTRIL) 10 mg tablet, Take 1 tablet (10 mg total) by mouth daily, Disp: 90 tablet, Rfl: 1    metFORMIN (GLUCOPHAGE-XR) 500 mg 24 hr tablet, Take 4 tablets (2,000 mg total) by mouth daily with breakfast, Disp: 360 tablet, Rfl: 1    mupirocin (BACTROBAN) 2 % ointment, Apply topically 3 (three) times a day, Disp: 22 g, Rfl: 0    ONE TOUCH LANCETS MISC, by Does not apply route daily Use as instructed 1x/d [E11 20], Disp: 100 each, Rfl: 3    pravastatin (PRAVACHOL) 20 mg tablet, Take 1 tablet (20 mg total) by mouth daily, Disp: 90 tablet, Rfl: 1    sulfamethoxazole-trimethoprim (BACTRIM DS) 800-160 mg per tablet, Take 1 tablet by mouth every 12 (twelve) hours for 7 days, Disp: 14 tablet, Rfl: 0    Current Allergies     Allergies as of 05/09/2021    (No Known Allergies)            The following portions of the patient's history were reviewed and updated as appropriate: allergies, current medications, past family history, past medical history, past social history, past surgical history and problem list      Past Medical History:   Diagnosis Date    Benign essential hypertension     LAST ASSESSED 5/1/17; RESOLVED 5/1/17    Chronic pain disorder     Controlled insulin dependent diabetes mellitus     LAST ASSESSED 4/1/15; RESOLVED 5/2/16    Hyperlipidemia     Low back pain     Neck pain     Osteoarthritis     Pericarditis     Type 2 diabetes mellitus ([facility] [address] )        Past Surgical History:   Procedure Laterality Date    KNEE SURGERY      NERVE BLOCK Bilateral 7/19/2019    Procedure: C3 C4 C5 C6 Medial Branch Block #1 ([id number]); Surgeon: Alejandra Carrion MD;  Location: [facility] MAIN OR;  Service: Pain Management     NERVE BLOCK Bilateral 8/2/2019    Procedure: C3 C4 C5 C6 Medial Branch Block #2 ([id number]);   Surgeon: Alejandra Carrion MD;  Location: [facility] MAIN OR;  Service: Pain Management     RADIOFREQUENCY ABLATION Right 8/16/2019    Procedure: C3 C4 C5 C6 RADIOFREQUENCY ABLATION;  Surgeon: Nehemais Arango MD;  [facility];  Service: Pain Management     RADIOFREQUENCY ABLATION Left 8/30/2019    Procedure: C3 C4 C5 C6 RADIOFREQUENCY ABLATION;  Surgeon: Nehemias Arango MD;  Location: [facility] MA[facility] OR;  Service: Pain Management     REPLACEMENT TOTAL KNEE Right     WRIST SURGERY         Family History   Problem Relation Age of Onset    Cancer Family     Cancer Mother     No Known Problems Sister     No Known Problems Brother     No Known Problems Maternal Aunt     No Known Problems Maternal Uncle     No Known Problems Paternal Aunt     No Known Problems Paternal Uncle     No Known Problems Maternal Grandmother     No Known Problems Maternal Grandfather     No Known Problems Paternal Grandmother     No Known Problems Paternal Grandfather          Medications have been verified  Objective   /100   Pulse 74   Temp (!) 97 °F (36 1 °C)   Resp 16   Ht 6' 1 5" (1 867 m)   Wt 106 kg (234 lb)   SpO2 97%   BMI 30 45 kg/m²        Physical Exam     Physical Exam  Constitutional:       General: He is not in acute distress  Appearance: He is well-developed  Pulmonary:      Effort: Pulmonary effort is normal    Skin:     General: Skin is warm and dry  Comments: Left radial forearm with small ulcer with scant serous drainage  Very tender indurated  Erythema induration extending to 3 cm  No streaking  No drainage expressed  Not fluctuant  Neurological:      Mental Status: He is alert and oriented to person, place, and time

## 2021-05-09 NOTE — PATIENT INSTRUCTIONS
He is on Keflex but not improving  We will start with Bactrim and mupirocin  Continue warm compresses  Follow up with PCP in 2-3 days  If symptoms are worse go to the ER

## 2021-05-12 ENCOUNTER — OFFICE VISIT (OUTPATIENT)
Dept: URGENT CARE | Facility: CLINIC | Age: 61
End: 2021-05-12
Payer: COMMERCIAL

## 2021-05-12 VITALS
DIASTOLIC BLOOD PRESSURE: 91 MMHG | TEMPERATURE: 97.8 F | BODY MASS INDEX: 30.75 KG/M2 | HEIGHT: 73 IN | SYSTOLIC BLOOD PRESSURE: 179 MMHG | OXYGEN SATURATION: 95 % | RESPIRATION RATE: 18 BRPM | WEIGHT: 232 LBS | HEART RATE: 88 BPM

## 2021-05-12 DIAGNOSIS — T63.301D SPIDER BITE WOUND, ACCIDENTAL OR UNINTENTIONAL, SUBSEQUENT ENCOUNTER: Primary | ICD-10-CM

## 2021-05-12 PROCEDURE — 10060 I&D ABSCESS SIMPLE/SINGLE: CPT | Performed by: PHYSICIAN ASSISTANT

## 2021-05-12 PROCEDURE — 99243 OFF/OP CNSLTJ NEW/EST LOW 30: CPT | Performed by: PHYSICIAN ASSISTANT

## 2021-05-12 RX ORDER — SULFAMETHOXAZOLE AND TRIMETHOPRIM 800; 160 MG/1; MG/1
1 TABLET ORAL EVERY 12 HOURS SCHEDULED
Qty: 14 TABLET | Refills: 0 | Status: SHIPPED | OUTPATIENT
Start: 2021-05-12 | End: 2021-05-19

## 2021-05-12 NOTE — PATIENT INSTRUCTIONS
Scant purulent fluid expressed  We will add an additional week of Bactrim  Follow-up with PCP  If symptoms are worse or fever go to the ER

## 2021-05-12 NOTE — TELEPHONE ENCOUNTER
Upon review of the In Basket request we were able to locate, review, and update the patient chart as requested for Pap Smear (HPV) aka Cervical Cancer Screening  Any additional questions or concerns should be emailed to the Practice Liaisons via [email or web address] email, please do not reply via In Basket      Thank you  Cristina Hernandez

## 2021-05-12 NOTE — PROGRESS NOTES
[facility]        NAME: Laurent Martin is a 63 y o  male  : 1960    MRN: [id number]  DATE: May 12, 2021  TIME: 7:48 PM    Assessment and Plan   Spider bite wound, accidental or unintentional, subsequent encounter [T63 301D]  1  Spider bite wound, accidental or unintentional, subsequent encounter  sulfamethoxazole-trimethoprim (BACTRIM DS) 800-160 mg per tablet         Patient Instructions     Patient Instructions     Scant purulent fluid expressed  We will add an additional week of Bactrim  Follow-up with PCP  If symptoms are worse or fever go to the ER  Follow up with PCP in 3-5 days  Proceed to  ER if symptoms worsen  Chief Complaint     Chief Complaint   Patient presents with    Wound Infection     left forearm draining serous liquid reddness and hard area inside         History of Present Illness         69-year-old male presents with continued left forearm abscess and wound  He says he had some white drainage  It is not resolved seems to be improving  No fever  Review of Systems   Review of Systems   Constitutional: Negative  HENT: Negative  Respiratory: Negative  Cardiovascular: Negative  Gastrointestinal: Negative  Skin: Positive for color change and wound           Current Medications       Current Outpatient Medications:     glimepiride (AMARYL) 2 mg tablet, Take 1 tablet (2 mg total) by mouth daily with breakfast, Disp: 90 tablet, Rfl: 1    glucose blood (ONETOUCH VERIO) test strip, Use as instructed 1x/d [E11 20], Disp: 100 each, Rfl: 1    lidocaine (LIDODERM) 5 %, Apply 1 patch topically daily Remove & Discard patch within 12 hours or as directed by MD, Disp: 15 patch, Rfl: 0    lisinopril (ZESTRIL) 10 mg tablet, Take 1 tablet (10 mg total) by mouth daily, Disp: 90 tablet, Rfl: 1    metFORMIN (GLUCOPHAGE-XR) 500 mg 24 hr tablet, Take 4 tablets (2,000 mg total) by mouth daily with breakfast, Disp: 360 tablet, Rfl: 1    mupirocin (BACTROBAN) 2 % ointment, Apply topically 3 (three) times a day, Disp: 22 g, Rfl: 0    ONE TOUCH LANCETS MISC, by Does not apply route daily Use as instructed 1x/d [E11 20], Disp: 100 each, Rfl: 3    pravastatin (PRAVACHOL) 20 mg tablet, Take 1 tablet (20 mg total) by mouth daily, Disp: 90 tablet, Rfl: 1    sulfamethoxazole-trimethoprim (BACTRIM DS) 800-160 mg per tablet, Take 1 tablet by mouth every 12 (twelve) hours for 7 days, Disp: 14 tablet, Rfl: 0    Current Allergies     Allergies as of 05/12/2021    (No Known Allergies)            The following portions of the patient's history were reviewed and updated as appropriate: allergies, current medications, past family history, past medical history, past social history, past surgical history and problem list      Past Medical History:   Diagnosis Date    Benign essential hypertension     LAST ASSESSED 5/1/17; RESOLVED 5/1/17    Chronic pain disorder     Controlled insulin dependent diabetes mellitus     LAST ASSESSED 4/1/15; RESOLVED 5/2/16    Hyperlipidemia     Low back pain     Neck pain     Osteoarthritis     Pericarditis     Type 2 diabetes mellitus ([facility] [address] )        Past Surgical History:   Procedure Laterality Date    KNEE SURGERY      NERVE BLOCK Bilateral 7/19/2019    Procedure: C3 C4 C5 C6 Medial Branch Block #1 ([id number]); Surgeon: Mary Fonseca MD;  Location: [facility] MAIN OR;  Service: Pain Management     NERVE BLOCK Bilateral 8/2/2019    Procedure: C3 C4 C5 C6 Medial Branch Block #2 ([id number]);   Surgeon: Mary Fonseca MD;  Location: [facility] MAIN OR;  Service: Pain Management     RADIOFREQUENCY ABLATION Right 8/16/2019    Procedure: C3 C4 C5 C6 RADIOFREQUENCY ABLATION;  Surgeon: Mary Fonseca MD;  Location: [facility] MAIN OR;  Service: Pain Management     RADIOFREQUENCY ABLATION Left 8/30/2019    Procedure: C3 C4 C5 C6 RADIOFREQUENCY ABLATION;  Surgeon: Mary Fonseca MD;  Location: [facility] MAIN OR;  Service: Pain Management  REPLACEMENT TOTAL KNEE Right     WRIST SURGERY         Family History   Problem Relation Age of Onset    Cancer Family     Cancer Mother     No Known Problems Sister     No Known Problems Brother     No Known Problems Maternal Aunt     No Known Problems Maternal Uncle     No Known Problems Paternal Aunt     No Known Problems Paternal Uncle     No Known Problems Maternal Grandmother     No Known Problems Maternal Grandfather     No Known Problems Paternal Grandmother     No Known Problems Paternal Grandfather          Medications have been verified  Objective   BP (!) 179/91   Pulse 88   Temp 97 8 °F (36 6 °C)   Resp 18   Ht 6' 1" (1 854 m)   Wt 105 kg (232 lb)   SpO2 95%   BMI 30 61 kg/m²          Physical Exam     Physical Exam  Constitutional:       General: He is not in acute distress  Appearance: He is well-developed  Pulmonary:      Effort: Pulmonary effort is normal    Skin:     General: Skin is warm and dry  Comments: Left radial forearm there is a small spider bite with granulation tissue  Some wrinkling the skin and improving erythema compared to last visit  He has induration extending the 1 5 cm  3 cm of erythema  Neurological:      Mental Status: He is alert and oriented to person, place, and time  Incision and drain    Date/Time: 5/12/2021 7:47 PM  Performed by: Alexandra Man PA-C  Authorized by: Alexandra Man PA-C   Universal Protocol:  Consent given by: patient and spouse  Patient identity confirmed: verbally with patient      Patient location:  Clinic  Location:     Type:  Abscess    Size:  1 cm    Location:  Upper extremity    Upper extremity location:  L arm  Pre-procedure details:     Skin preparation:  Antiseptic wash  Anesthesia (see MAR for exact dosages):      Anesthesia method:  Topical application and local infiltration    Local anesthetic:  Lidocaine 1% WITH epi  Procedure details:     Complexity:  Simple    Needle aspiration: no Incision types:  Stab incision    Scalpel blade:  11    Incision depth:  Subcutaneous    Drainage:  Purulent    Drainage amount:  Scant    Wound treatment:  Wound left open  Post-procedure details:     Patient tolerance of procedure: Tolerated well, no immediate complications  Comments:        Single stab incision made  Some loculations felt and expressed scant purulent drainage  Improved pain and swelling after expression

## 2021-05-20 ENCOUNTER — TELEPHONE (OUTPATIENT)
Dept: FAMILY MEDICINE CLINIC | Facility: CLINIC | Age: 61
End: 2021-05-20

## 2021-05-20 NOTE — TELEPHONE ENCOUNTER
We don't usually see cramping as a side effect from antibiotics  He should make sure he is well hydrated and can try gatorade for extra electrolytes or half a banana at bedtime, which helps a lot of patients with the cramping

## 2021-05-20 NOTE — TELEPHONE ENCOUNTER
Patients wife calling stating that patient is getting leg cramps and wants to know if its from the ABX he is on for the spider bite      Please advise

## 2021-05-23 LAB
ALBUMIN SERPL-MCNC: 4.7 G/DL (ref 3.8–4.8)
ALBUMIN/CREAT UR: 26 MG/G CREAT (ref 0–29)
ALBUMIN/GLOB SERPL: 1.8 {RATIO} (ref 1.2–2.2)
ALP SERPL-CCNC: 99 IU/L (ref 48–121)
ALT SERPL-CCNC: 15 IU/L (ref 0–44)
AST SERPL-CCNC: 15 IU/L (ref 0–40)
BILIRUB SERPL-MCNC: 0.5 MG/DL (ref 0–1.2)
BUN SERPL-MCNC: 16 MG/DL (ref 8–27)
BUN/CREAT SERPL: 17 (ref 10–24)
CALCIUM SERPL-MCNC: 9.8 MG/DL (ref 8.6–10.2)
CHLORIDE SERPL-SCNC: 97 MMOL/L (ref 96–106)
CHOLEST SERPL-MCNC: 110 MG/DL (ref 100–199)
CO2 SERPL-SCNC: 21 MMOL/L (ref 20–29)
CREAT SERPL-MCNC: 0.96 MG/DL (ref 0.76–1.27)
CREAT UR-MCNC: 46 MG/DL
GLOBULIN SER-MCNC: 2.6 G/DL (ref 1.5–4.5)
GLUCOSE SERPL-MCNC: 144 MG/DL (ref 65–99)
HBA1C MFR BLD: 8.1 % (ref 4.8–5.6)
HDLC SERPL-MCNC: 42 MG/DL
LDLC SERPL CALC-MCNC: 54 MG/DL (ref 0–99)
MICROALBUMIN UR-MCNC: 11.8 UG/ML
MICRODELETION SYND BLD/T FISH: NORMAL
POTASSIUM SERPL-SCNC: 4.9 MMOL/L (ref 3.5–5.2)
PROT SERPL-MCNC: 7.3 G/DL (ref 6–8.5)
PSA SERPL-MCNC: 0.5 NG/ML (ref 0–4)
SL AMB EGFR AFRICAN AMERICAN: 98 ML/MIN/1.73
SL AMB EGFR NON AFRICAN AMERICAN: 85 ML/MIN/1.73
SODIUM SERPL-SCNC: 133 MMOL/L (ref 134–144)
TRIGL SERPL-MCNC: 65 MG/DL (ref 0–149)

## 2021-05-24 ENCOUNTER — OFFICE VISIT (OUTPATIENT)
Dept: URGENT CARE | Facility: CLINIC | Age: 61
End: 2021-05-24
Payer: COMMERCIAL

## 2021-05-24 VITALS
HEIGHT: 74 IN | TEMPERATURE: 97 F | OXYGEN SATURATION: 99 % | DIASTOLIC BLOOD PRESSURE: 80 MMHG | SYSTOLIC BLOOD PRESSURE: 116 MMHG | HEART RATE: 79 BPM | WEIGHT: 221.6 LBS | RESPIRATION RATE: 18 BRPM | BODY MASS INDEX: 28.44 KG/M2

## 2021-05-24 DIAGNOSIS — R10.84 GENERALIZED ABDOMINAL PAIN: Primary | ICD-10-CM

## 2021-05-24 PROCEDURE — 99213 OFFICE O/P EST LOW 20 MIN: CPT | Performed by: PHYSICIAN ASSISTANT

## 2021-05-24 NOTE — PATIENT INSTRUCTIONS
Likely related to gas due to antibiotic use  Recommend over the counter gas-x and colace  Also recommend activia yogurt  Make sure to increase water intake as well  If no improvement f/u with PCP    Follow up with PCP in 3-5 days  Proceed to  ER if symptoms worsen  Gas and Bloating   WHAT YOU NEED TO KNOW:   Gas forms inside your body when you eat certain foods or swallow too much air  Bloating is the tight, full feeling you get from too much gas  DISCHARGE INSTRUCTIONS:   Medicines:   · Gas relief medicines: These may help decrease gas pain and bloating  These can be bought without a doctor's order  · Take your medicine as directed  Contact your healthcare provider if you think your medicine is not helping or if you have side effects  Tell him or her if you are allergic to any medicine  Keep a list of the medicines, vitamins, and herbs you take  Include the amounts, and when and why you take them  Bring the list or the pill bottles to follow-up visits  Carry your medicine list with you in case of an emergency  How to manage gas and bloating:   · Keep a log:  Write down what you eat and drink and how often you pass gas each day  · Eat and drink slowly:  Choose foods that do not cause gas, such as meat, poultry, fish, and eggs  Avoid high-fat foods and vegetables or starches that can cause gas  Do not drink carbonated drinks  Add foods back into your diet one at a time after about a week  If the food causes symptoms, avoid it  · Exercise:  Exercise can help relieve gas  · Do not smoke or chew gum: This can cause you to swallow air  · Make sure your dentures fit properly:  Have your dentures fixed if they are loose  Loose dentures can cause you to swallow too much air  Follow up with your healthcare provider as directed:  Write down your questions so you remember to ask them during your visits  Contact your healthcare provider if:   · You have a fever      · You vomit or have diarrhea  · You lose weight without trying  · You have questions or concerns about your condition or care  Return to the emergency department if:   · You have severe abdominal pain  · You have blood in your bowel movement  © Copyright [address] Information is for End User's use only and may not be sold, redistributed or otherwise used for commercial purposes  All illustrations and images included in CareNotes® are the copyrighted property of A D A M , Inc  or [facility] Misty Higuera   The above information is an  only  It is not intended as medical advice for individual conditions or treatments  Talk to your doctor, nurse or pharmacist before following any medical regimen to see if it is safe and effective for you

## 2021-05-24 NOTE — PROGRESS NOTES
[facility]    NAME: Omayra Perez is a 63 y o  male  : 1960    MRN: [id number]  DATE: May 29, 2021  TIME: 10:06 PM    Assessment and Plan   Generalized abdominal pain [R10 84]  1  Generalized abdominal pain           Patient Instructions   Likely related to gas due to antibiotic use  Recommend over the counter gas-x and colace  Also recommend activia yogurt  Make sure to increase water intake as well  If no improvement f/u with PCP    Follow up with PCP in 3-5 days  Proceed to  ER if symptoms worsen  Chief Complaint     Chief Complaint   Patient presents with    Abdominal Pain     Has been on Bactrim x 2 weeks for brown recluse spider bite L forearm  Stopped it Thursday after developing mid abdominal cramping that comes and goes  States he feels gassy and bloated  No N V/D - has some constipation  Started stool softener x 2 and one dose Miralax Saturday  History of Present Illness         Marvin Doyle is a 56-year-old male who presents to the clinic complaining of abdominal pain x6 days  He states that the pain is intermittent and sharp in quality  He denies any radiation and localizes the pain as diffuse and generalized  He denies any fever, chills, nausea, vomiting, diarrhea, or constipation  He states that his last bowel movement was this morning and that the pain improved with bowel movements  He states that his normal amount of bowel movements is 2 per day and currently he is experiencing 1 per day  Review of Systems   Review of Systems   Constitutional: Negative for activity change, appetite change, chills, fatigue and fever  Gastrointestinal: Positive for abdominal pain  Negative for abdominal distention, constipation, diarrhea, nausea and vomiting       Current Medications       Current Outpatient Medications:     glimepiride (AMARYL) 2 mg tablet, Take 1 tablet (2 mg total) by mouth daily with breakfast, Disp: 90 tablet, Rfl: 1    glucose blood (ONETOUCH VERIO) test strip, Use as instructed 1x/d [E11 20], Disp: 100 each, Rfl: 1    lisinopril (ZESTRIL) 10 mg tablet, Take 1 tablet (10 mg total) by mouth daily, Disp: 90 tablet, Rfl: 1    metFORMIN (GLUCOPHAGE-XR) 500 mg 24 hr tablet, Take 4 tablets (2,000 mg total) by mouth daily with breakfast, Disp: 360 tablet, Rfl: 1    ONE TOUCH LANCETS MISC, by Does not apply route daily Use as instructed 1x/d [E11 20], Disp: 100 each, Rfl: 3    pravastatin (PRAVACHOL) 20 mg tablet, Take 1 tablet (20 mg total) by mouth daily, Disp: 90 tablet, Rfl: 1    lidocaine (LIDODERM) 5 %, Apply 1 patch topically daily Remove & Discard patch within 12 hours or as directed by MD, Disp: 15 patch, Rfl: 0    mupirocin (BACTROBAN) 2 % ointment, Apply topically 3 (three) times a day, Disp: 22 g, Rfl: 0    Current Allergies     Allergies as of 05/24/2021    (No Known Allergies)            The following portions of the patient's history were reviewed and updated as appropriate: allergies, current medications, past family history, past medical history, past social history, past surgical history and problem list      Past Medical History:   Diagnosis Date    Benign essential hypertension     LAST ASSESSED 5/1/17; RESOLVED 5/1/17    Chronic pain disorder     Controlled insulin dependent diabetes mellitus     LAST ASSESSED 4/1/15; RESOLVED 5/2/16    Hyperlipidemia     Low back pain     Neck pain     Osteoarthritis     Pericarditis     Type 2 diabetes mellitus ([facility] [address] )        Past Surgical History:   Procedure Laterality Date    KNEE SURGERY      NERVE BLOCK Bilateral 7/19/2019    Procedure: C3 C4 C5 C6 Medial Branch Block #1 ([id number]); Surgeon: Kulwinder Galvan MD;  Location: [facility] MAIN OR;  Service: Pain Management     NERVE BLOCK Bilateral 8/2/2019    Procedure: C3 C4 C5 C6 Medial Branch Block #2 ([id number]);   Surgeon: Kulwinder Galvan MD;  Location: [facility] MAIN OR;  Service: Pain Management     RADIOFREQUENCY ABLATION Right 8/16/2019    Procedure: C3 C4 C5 C6 RADIOFREQUENCY ABLATION;  Surgeon: Mary Fonseca MD;  Location: [facility] MAIN OR;  Service: Pain Management     RADIOFREQUENCY ABLATION Left 8/30/2019    Procedure: C3 C4 C5 C6 RADIOFREQUENCY ABLATION;  Surgeon: Mary Fonseca MD;  Location: [facility] MAIN OR;  Service: Pain Management     REPLACEMENT TOTAL KNEE Right     WRIST SURGERY         Family History   Problem Relation Age of Onset    Cancer Family     Cancer Mother     No Known Problems Sister     No Known Problems Brother     No Known Problems Maternal Aunt     No Known Problems Maternal Uncle     No Known Problems Paternal Aunt     No Known Problems Paternal Uncle     No Known Problems Maternal Grandmother     No Known Problems Maternal Grandfather     No Known Problems Paternal Grandmother     No Known Problems Paternal Grandfather          Medications have been verified  Objective   /80   Pulse 79   Temp (!) 97 °F (36 1 °C)   Resp 18   Ht 6' 1 5" (1 867 m)   Wt 101 kg (221 lb 9 6 oz)   SpO2 99%   BMI 28 84 kg/m²   No LMP for male patient  Physical Exam     Physical Exam  Vitals signs and nursing note reviewed  Constitutional:       General: He is not in acute distress  Appearance: He is well-developed  He is not ill-appearing  Cardiovascular:      Rate and Rhythm: Normal rate and regular rhythm  Heart sounds: Normal heart sounds  Pulmonary:      Effort: Pulmonary effort is normal       Breath sounds: Normal breath sounds  Abdominal:      General: Bowel sounds are normal       Palpations: Abdomen is soft  Tenderness: There is no abdominal tenderness  There is no guarding or rebound  Hernia: No hernia is present  Neurological:      Mental Status: He is alert

## 2021-05-24 NOTE — LETTER
May 24, 2021     Patient: Manfred Zaldivar YOB: 1960   Date of Visit: 5/24/2021       To Whom it May Concern:    Kayla Benavides was seen in my clinic on 5/24/2021  Please excuse from work on 5/24/2021  If you have any questions or concerns, please don't hesitate to call           Sincerely,          Alba Michael PA-C        CC: No Recipients

## 2021-06-02 ENCOUNTER — TELEPHONE (OUTPATIENT)
Dept: FAMILY MEDICINE CLINIC | Facility: CLINIC | Age: 61
End: 2021-06-02

## 2021-06-11 ENCOUNTER — OFFICE VISIT (OUTPATIENT)
Dept: FAMILY MEDICINE CLINIC | Facility: CLINIC | Age: 61
End: 2021-06-11
Payer: COMMERCIAL

## 2021-06-11 VITALS
HEART RATE: 67 BPM | OXYGEN SATURATION: 97 % | TEMPERATURE: 96.8 F | DIASTOLIC BLOOD PRESSURE: 70 MMHG | HEIGHT: 73 IN | SYSTOLIC BLOOD PRESSURE: 120 MMHG | BODY MASS INDEX: 29.16 KG/M2 | RESPIRATION RATE: 16 BRPM | WEIGHT: 220 LBS

## 2021-06-11 DIAGNOSIS — N18.2 TYPE 2 DIABETES MELLITUS WITH STAGE 2 CHRONIC KIDNEY DISEASE, WITHOUT LONG-TERM CURRENT USE OF INSULIN (HCC): ICD-10-CM

## 2021-06-11 DIAGNOSIS — N18.2 CKD STAGE 2 DUE TO TYPE 2 DIABETES MELLITUS (HCC): ICD-10-CM

## 2021-06-11 DIAGNOSIS — E78.5 HYPERLIPIDEMIA LDL GOAL <100: ICD-10-CM

## 2021-06-11 DIAGNOSIS — E11.22 TYPE 2 DIABETES MELLITUS WITH STAGE 2 CHRONIC KIDNEY DISEASE, WITHOUT LONG-TERM CURRENT USE OF INSULIN (HCC): ICD-10-CM

## 2021-06-11 DIAGNOSIS — Z00.00 HEALTHCARE MAINTENANCE: Primary | ICD-10-CM

## 2021-06-11 DIAGNOSIS — E11.22 CKD STAGE 2 DUE TO TYPE 2 DIABETES MELLITUS (HCC): ICD-10-CM

## 2021-06-11 PROBLEM — L02.93 CARBUNCLE: Status: RESOLVED | Noted: 2021-05-07 | Resolved: 2021-06-11

## 2021-06-11 PROCEDURE — 99396 PREV VISIT EST AGE 40-64: CPT | Performed by: FAMILY MEDICINE

## 2021-06-11 RX ORDER — GLIMEPIRIDE 4 MG/1
4 TABLET ORAL
Qty: 90 TABLET | Refills: 1 | Status: SHIPPED | OUTPATIENT
Start: 2021-06-11

## 2021-06-11 NOTE — PATIENT INSTRUCTIONS
Please increase your glimepiride 2mg in the morning to 4mg in the morning to better control your diabetes

## 2021-06-11 NOTE — PROGRESS NOTES
Assessment/Plan:    No problem-specific Assessment & Plan notes found for this encounter  cpe  DM not controlled  Increase amaryl 2mg/d to 4mg/d  Labs reviewed  F/u 3m  Diet/exercise advised     Diagnoses and all orders for this visit:    Healthcare maintenance    Type 2 diabetes mellitus with stage 2 chronic kidney disease, without long-term current use of insulin (HCC)  -     glimepiride (AMARYL) 4 mg tablet; Take 1 tablet (4 mg total) by mouth daily with breakfast  -     Comprehensive metabolic panel; Future  -     Hemoglobin A1C; Future    CKD stage 2 due to type 2 diabetes mellitus ([facility] [address] )    Hyperlipidemia LDL goal <100        Return in about 3 months (around 9/16/2021)  Subjective:      Patient ID: Joan Velazquez is a 63 y o  male  Chief Complaint   Patient presents with    Physical Exam     [id number]        HPI  Follows diet  Low carb  Taking all meds  Some exercise planned  No smbg done    The following portions of the patient's history were reviewed and updated as appropriate: allergies, current medications, past family history, past medical history, past social history, past surgical history and problem list     Review of Systems   Respiratory: Negative for shortness of breath  Cardiovascular: Negative for chest pain           Current Outpatient Medications   Medication Sig Dispense Refill    glimepiride (AMARYL) 4 mg tablet Take 1 tablet (4 mg total) by mouth daily with breakfast 90 tablet 1    glucose blood (ONETOUCH VERIO) test strip Use as instructed 1x/d [E11 20] 100 each 1    lisinopril (ZESTRIL) 10 mg tablet Take 1 tablet (10 mg total) by mouth daily 90 tablet 1    metFORMIN (GLUCOPHAGE-XR) 500 mg 24 hr tablet Take 4 tablets (2,000 mg total) by mouth daily with breakfast 360 tablet 1    ONE TOUCH LANCETS MISC by Does not apply route daily Use as instructed 1x/d [E11 20] 100 each 3    pravastatin (PRAVACHOL) 20 mg tablet Take 1 tablet (20 mg total) by mouth daily 90 tablet 1  lidocaine (LIDODERM) 5 % Apply 1 patch topically daily Remove & Discard patch within 12 hours or as directed by MD (Patient not taking: Reported on 6/11/2021) 15 patch 0    mupirocin (BACTROBAN) 2 % ointment Apply topically 3 (three) times a day (Patient not taking: Reported on 6/11/2021) 22 g 0     No current facility-administered medications for this visit  Objective:    /70   Pulse 67   Temp (!) 96 8 °F (36 °C)   Resp 16   Ht 6' 1" (1 854 m)   Wt 99 8 kg (220 lb)   SpO2 97%   BMI 29 03 kg/m²        Physical Exam  Vitals signs and nursing note reviewed  Constitutional:       Appearance: He is well-developed  He is not ill-appearing  HENT:      Head: Normocephalic  Right Ear: Tympanic membrane normal       Left Ear: Tympanic membrane normal    Eyes:      General: No scleral icterus  Conjunctiva/sclera: Conjunctivae normal    Neck:      Musculoskeletal: Neck supple  Vascular: No carotid bruit  Cardiovascular:      Rate and Rhythm: Normal rate and regular rhythm  Heart sounds: No murmur  Pulmonary:      Effort: Pulmonary effort is normal  No respiratory distress  Breath sounds: No wheezing  Abdominal:      General: There is no distension  Palpations: Abdomen is soft  Tenderness: There is no abdominal tenderness  Hernia: No hernia is present  Genitourinary:     Penis: Normal        Scrotum/Testes: Normal       Prostate: Normal       Rectum: Normal    Musculoskeletal:         General: No deformity  Right lower leg: No edema  Left lower leg: No edema  Skin:     General: Skin is warm and dry  Neurological:      Mental Status: He is alert  Motor: No weakness  Gait: Gait normal    Psychiatric:         Mood and Affect: Mood normal          Behavior: Behavior normal          Thought Content:  Thought content normal                 Nadege Herr DO

## 2021-07-26 ENCOUNTER — APPOINTMENT (OUTPATIENT)
Dept: RADIOLOGY | Facility: CLINIC | Age: 61
End: 2021-07-26
Payer: COMMERCIAL

## 2021-07-26 ENCOUNTER — OFFICE VISIT (OUTPATIENT)
Dept: OBGYN CLINIC | Facility: CLINIC | Age: 61
End: 2021-07-26
Payer: COMMERCIAL

## 2021-07-26 VITALS
HEART RATE: 78 BPM | WEIGHT: 220 LBS | DIASTOLIC BLOOD PRESSURE: 83 MMHG | BODY MASS INDEX: 29.16 KG/M2 | HEIGHT: 73 IN | SYSTOLIC BLOOD PRESSURE: 133 MMHG

## 2021-07-26 DIAGNOSIS — M25.511 RIGHT SHOULDER PAIN, UNSPECIFIED CHRONICITY: ICD-10-CM

## 2021-07-26 DIAGNOSIS — M75.21 BICEPS TENDINITIS OF RIGHT SHOULDER: ICD-10-CM

## 2021-07-26 DIAGNOSIS — M75.41 SHOULDER IMPINGEMENT SYNDROME, RIGHT: Primary | ICD-10-CM

## 2021-07-26 PROCEDURE — 1036F TOBACCO NON-USER: CPT | Performed by: ORTHOPAEDIC SURGERY

## 2021-07-26 PROCEDURE — 73030 X-RAY EXAM OF SHOULDER: CPT

## 2021-07-26 PROCEDURE — 20610 DRAIN/INJ JOINT/BURSA W/O US: CPT | Performed by: ORTHOPAEDIC SURGERY

## 2021-07-26 PROCEDURE — 99214 OFFICE O/P EST MOD 30 MIN: CPT | Performed by: ORTHOPAEDIC SURGERY

## 2021-07-26 PROCEDURE — 3008F BODY MASS INDEX DOCD: CPT | Performed by: ORTHOPAEDIC SURGERY

## 2021-07-26 RX ORDER — DEXAMETHASONE SODIUM PHOSPHATE 100 MG/10ML
40 INJECTION INTRAMUSCULAR; INTRAVENOUS
Status: COMPLETED | OUTPATIENT
Start: 2021-07-26 | End: 2021-07-26

## 2021-07-26 RX ORDER — LIDOCAINE HYDROCHLORIDE 10 MG/ML
4 INJECTION, SOLUTION INFILTRATION; PERINEURAL
Status: COMPLETED | OUTPATIENT
Start: 2021-07-26 | End: 2021-07-26

## 2021-07-26 RX ADMIN — LIDOCAINE HYDROCHLORIDE 4 ML: 10 INJECTION, SOLUTION INFILTRATION; PERINEURAL at 11:55

## 2021-07-26 RX ADMIN — DEXAMETHASONE SODIUM PHOSPHATE 40 MG: 100 INJECTION INTRAMUSCULAR; INTRAVENOUS at 11:55

## 2021-07-26 NOTE — PROGRESS NOTES
Assessment/Plan:  1  Shoulder impingement syndrome, right  XR shoulder 2+ vw right   2  Biceps tendinitis of right shoulder           Lydia Nguyễn has right-sided shoulder pain consistent with subacromial impingement  I recommended physical therapy or home exercises  It is expensive in difficult for him to get therapy he would like to try home exercises first   I recommended continued use of ice and anti-inflammatories  We did  Proceed with a right subacromial cortisone injection in the office today  He tolerated the injection well  Hopefully this gives him reduced discomfort along the bursa  He also has discomfort along the biceps tendon which can improve with activity modification and the above recommended exercises and treatment modalities  He will follow up in 6 weeks if the pain persists or worsens  Large joint arthrocentesis: R subacromial bursa  Universal Protocol:  Consent given by: patient  Time out: Immediately prior to procedure a "time out" was called to verify the correct patient, procedure, equipment, support staff and site/side marked as required  Site marked: the operative site was marked  Supporting Documentation  Indications: pain   Procedure Details  Location: shoulder - R subacromial bursa  Preparation: Patient was prepped and draped in the usual sterile fashion  Needle size: 22 G  Ultrasound guidance: no  Approach: posterior  Medications administered: 40 mg dexamethasone 100 mg/10 mL; 4 mL lidocaine 1 %    Patient tolerance: patient tolerated the procedure well with no immediate complications  Dressing:  Sterile dressing applied          Subjective:   Riaz Eaton  is a 63 y o  male who presents   To the office for evaluation for the acute onset of right-sided shoulder pain for the last 2 months  He denies any specific injury or trauma  He has been doing a new job which requires him to do a lot of lifting with his right shoulder    He has been pulling heavy chains and states that this has led most likely aggravated his right shoulder  He denies any  Specific day where the pain began  He denies feeling a pop  He denies any bruising  He denies any history of shoulder pain like this in the past   He denies any radiating pain or numbness down his arm  His pain worsens with overhead movement at times  He has tried ibuprofen which helps slightly  He has not been icing his shoulder  Review of Systems   Constitutional: Negative for chills, fever and unexpected weight change  HENT: Negative for hearing loss, nosebleeds and sore throat  Eyes: Negative for pain, redness and visual disturbance  Respiratory: Negative for cough, shortness of breath and wheezing  Cardiovascular: Negative for chest pain, palpitations and leg swelling  Gastrointestinal: Negative for abdominal pain, nausea and vomiting  Endocrine: Negative for polyphagia and polyuria  Genitourinary: Negative for dysuria and hematuria  Musculoskeletal:        See HPI   Skin: Negative for rash and wound  Neurological: Negative for dizziness, numbness and headaches  Psychiatric/Behavioral: Negative for decreased concentration and suicidal ideas  The patient is not nervous/anxious  Past Medical History:   Diagnosis Date    Benign essential hypertension     LAST ASSESSED 5/1/17; RESOLVED 5/1/17    Chronic pain disorder     Controlled insulin dependent diabetes mellitus     LAST ASSESSED 4/1/15; RESOLVED 5/2/16    Hyperlipidemia     Low back pain     Neck pain     Osteoarthritis     Pericarditis     Type 2 diabetes mellitus ([facility] [address] )        Past Surgical History:   Procedure Laterality Date    KNEE SURGERY      NERVE BLOCK Bilateral 7/19/2019    Procedure: C3 C4 C5 C6 Medial Branch Block #1 ([id number]);   Surgeon: Saul Spencer MD;  Location: [facility] MAIN OR;  Service: Pain Management     NERVE BLOCK Bilateral 8/2/2019    Procedure: C3 C4 C5 C6 Medial Branch Block #2 ([id number]); Surgeon: Ben Chamberlain MD;  Location: [facility] MAIN OR;  Service: Pain Management     RADIOFREQUENCY ABLATION Right 8/16/2019    Procedure: C3 C4 C5 C6 RADIOFREQUENCY ABLATION;  Surgeon: Ben Chamberlain MD;  Location: [facility] MAIN OR;  Service: Pain Management     RADIOFREQUENCY ABLATION Left 8/30/2019    Procedure: C3 C4 C5 C6 RADIOFREQUENCY ABLATION;  Surgeon: Ben Chamberlain MD;  Location: [facility] MAIN OR;  Service: Pain Management     REPLACEMENT TOTAL KNEE Right     WRIST SURGERY         Family History   Problem Relation Age of Onset    Cancer Family     Cancer Mother     No Known Problems Sister     No Known Problems Brother     No Known Problems Maternal Aunt     No Known Problems Maternal Uncle     No Known Problems Paternal Aunt     No Known Problems Paternal Uncle     No Known Problems Maternal Grandmother     No Known Problems Maternal Grandfather     No Known Problems Paternal Grandmother     No Known Problems Paternal Grandfather        Social History     Occupational History    Not on file   Tobacco Use    Smoking status: Never Smoker    Smokeless tobacco: Never Used   Vaping Use    Vaping Use: Never used   Substance and Sexual Activity    Alcohol use: No    Drug use: Never    Sexual activity: Not on file         Current Outpatient Medications:     glimepiride (AMARYL) 4 mg tablet, Take 1 tablet (4 mg total) by mouth daily with breakfast, Disp: 90 tablet, Rfl: 1    glucose blood (ONETOUCH VERIO) test strip, Use as instructed 1x/d [E11 20], Disp: 100 each, Rfl: 1    lisinopril (ZESTRIL) 10 mg tablet, Take 1 tablet (10 mg total) by mouth daily, Disp: 90 tablet, Rfl: 1    metFORMIN (GLUCOPHAGE-XR) 500 mg 24 hr tablet, Take 4 tablets (2,000 mg total) by mouth daily with breakfast, Disp: 360 tablet, Rfl: 1    ONE TOUCH LANCETS MISC, by Does not apply route daily Use as instructed 1x/d [E11 20], Disp: 100 each, Rfl: 3    pravastatin (PRAVACHOL) 20 mg tablet, Take 1 tablet (20 mg total) by mouth daily, Disp: 90 tablet, Rfl: 1    lidocaine (LIDODERM) 5 %, Apply 1 patch topically daily Remove & Discard patch within 12 hours or as directed by MD (Patient not taking: Reported on 6/11/2021), Disp: 15 patch, Rfl: 0    mupirocin (BACTROBAN) 2 % ointment, Apply topically 3 (three) times a day (Patient not taking: Reported on 6/11/2021), Disp: 22 g, Rfl: 0    No Known Allergies    Objective:  Vitals:    07/26/21 1057   BP: 133/83   Pulse: 78       Right Shoulder Exam     Tenderness   The patient is experiencing tenderness in the biceps tendon (Subacromial bursa)  Range of Motion   Active abduction: normal   Passive abduction: normal   Extension: normal   External rotation: normal   Forward flexion: normal   Internal rotation 0 degrees: normal     Muscle Strength   Abduction: 5/5   Internal rotation: 5/5   External rotation: 5/5   Supraspinatus: 5/5   Subscapularis: 5/5   Biceps: 5/5     Tests   Ybarra test: positive  Cross arm: negative  Impingement: positive  Drop arm: negative    Other   Erythema: absent  Sensation: normal  Pulse: present    Comments:   Positive  Empty can test            Physical Exam  Vitals reviewed  Constitutional:       Appearance: He is well-developed  HENT:      Head: Normocephalic and atraumatic  Eyes:      Conjunctiva/sclera: Conjunctivae normal       Pupils: Pupils are equal, round, and reactive to light  Cardiovascular:      Rate and Rhythm: Normal rate  Pulmonary:      Effort: Pulmonary effort is normal  No respiratory distress  Musculoskeletal:      Cervical back: Normal range of motion and neck supple  Comments: As noted in HPI   Skin:     General: Skin is warm and dry  Neurological:      Mental Status: He is alert and oriented to person, place, and time     Psychiatric:         Behavior: Behavior normal            I have personally reviewed pertinent films in PACS and my interpretation is as follows:   three-view x-rays of the right shoulder demonstrates no evidence of acute fracture or significant degenerative changes

## 2021-09-21 ENCOUNTER — APPOINTMENT (OUTPATIENT)
Dept: RADIOLOGY | Facility: CLINIC | Age: 61
End: 2021-09-21
Payer: COMMERCIAL

## 2021-09-21 ENCOUNTER — OFFICE VISIT (OUTPATIENT)
Dept: URGENT CARE | Facility: CLINIC | Age: 61
End: 2021-09-21
Payer: COMMERCIAL

## 2021-09-21 VITALS
DIASTOLIC BLOOD PRESSURE: 87 MMHG | TEMPERATURE: 98 F | SYSTOLIC BLOOD PRESSURE: 161 MMHG | OXYGEN SATURATION: 95 % | RESPIRATION RATE: 14 BRPM | HEART RATE: 70 BPM

## 2021-09-21 DIAGNOSIS — L03.113 CELLULITIS OF RIGHT ELBOW: Primary | ICD-10-CM

## 2021-09-21 DIAGNOSIS — M25.521 RIGHT ELBOW PAIN: ICD-10-CM

## 2021-09-21 PROCEDURE — 73080 X-RAY EXAM OF ELBOW: CPT

## 2021-09-21 PROCEDURE — 99214 OFFICE O/P EST MOD 30 MIN: CPT | Performed by: PHYSICIAN ASSISTANT

## 2021-09-21 RX ORDER — SULFAMETHOXAZOLE AND TRIMETHOPRIM 800; 160 MG/1; MG/1
1 TABLET ORAL EVERY 12 HOURS SCHEDULED
Qty: 14 TABLET | Refills: 0 | Status: SHIPPED | OUTPATIENT
Start: 2021-09-21 | End: 2021-09-28

## 2021-09-21 NOTE — LETTER
September 21, 2021     Patient: Refugio Chavez YOB: 1960   Date of Visit: 9/21/2021       To Whom It May Concern: It is my medical opinion that Edward Brown may return to work on 9/23/2021 or sooner if feeling better  If you have any questions or concerns, please don't hesitate to call           Sincerely,        Diana Moreno PA-C

## 2021-09-21 NOTE — PATIENT INSTRUCTIONS
Take the antibiotic as directed with food  While on this medication take a probiotic or eat yogurt  Take Tylenol for discomfort please  Apply ice for 20-30 at a time, as needed for swelling discomfort relief  Follow-up with your family doctor in 3-5 days  Proceed to the ER if symptoms worsen

## 2021-09-21 NOTE — PROGRESS NOTES
[facility]        NAME: Lizz Conway is a 57 y o  male  : 1960    MRN: [id number]  DATE: 2021  TIME: 3:29 PM    Assessment and Plan   Cellulitis of right elbow [W97 887]  1  Cellulitis of right elbow  sulfamethoxazole-trimethoprim (BACTRIM DS) 800-160 mg per tablet   2  Right elbow pain  XR elbow 3+ vw right     Reviewed with patient this appears to be a soft tissue infection rather than a septic joint  Given injuries from his animals will treat with Bactrim for coverage of scratch/bites  Pt advised to closely monitor and to go to ER with worsening  Pt and wife agreeable  All questions answered  Precautions given  Patient Instructions   Take the antibiotic as directed with food  While on this medication take a probiotic or eat yogurt  Take Tylenol for discomfort please  Apply ice for 20-30 at a time, as needed for swelling discomfort relief  Follow-up with your family doctor in 3-5 days  Proceed to the ER if symptoms worsen  Chief Complaint     Chief Complaint   Patient presents with    Elbow Pain     right elbow pain, denies trauma, reports the elbow feels warm      History of Present Illness     56 y/o male with c/o right elbow pain  Pain began gradually, described as pressure in the posterior elbow  It is constant and does not radiate  Over the past 2-3 days has noted warmth and increased swelling of the skin  In office noticed redness  Denies recent injury but reports he frequently has scratches of the lower arm from his dogs and cats  No fever, chills, sweats, HA, or dizziness  No joint swelling  Review of Systems   Review of Systems   Constitutional: Negative for chills, diaphoresis and fever  Musculoskeletal: Negative for joint swelling  Skin: Positive for color change  Negative for wound  Neurological: Negative for dizziness, weakness, numbness and headaches       Current Medications       Current Outpatient Medications:     glimepiride (AMARYL) 4 mg tablet, Take 1 tablet (4 mg total) by mouth daily with breakfast, Disp: 90 tablet, Rfl: 1    glucose blood (ONETOUCH VERIO) test strip, Use as instructed 1x/d [E11 20], Disp: 100 each, Rfl: 1    lidocaine (LIDODERM) 5 %, Apply 1 patch topically daily Remove & Discard patch within 12 hours or as directed by MD (Patient not taking: Reported on 6/11/2021), Disp: 15 patch, Rfl: 0    lisinopril (ZESTRIL) 10 mg tablet, Take 1 tablet (10 mg total) by mouth daily, Disp: 90 tablet, Rfl: 1    metFORMIN (GLUCOPHAGE-XR) 500 mg 24 hr tablet, Take 4 tablets (2,000 mg total) by mouth daily with breakfast, Disp: 360 tablet, Rfl: 1    mupirocin (BACTROBAN) 2 % ointment, Apply topically 3 (three) times a day (Patient not taking: Reported on 6/11/2021), Disp: 22 g, Rfl: 0    ONE TOUCH LANCETS MISC, by Does not apply route daily Use as instructed 1x/d [E11 20], Disp: 100 each, Rfl: 3    pravastatin (PRAVACHOL) 20 mg tablet, Take 1 tablet (20 mg total) by mouth daily, Disp: 90 tablet, Rfl: 1    sulfamethoxazole-trimethoprim (BACTRIM DS) 800-160 mg per tablet, Take 1 tablet by mouth every 12 (twelve) hours for 7 days, Disp: 14 tablet, Rfl: 0    Current Allergies     Allergies as of 09/21/2021    (No Known Allergies)          The following portions of the patient's history were reviewed and updated as appropriate: allergies, current medications, past family history, past medical history, past social history, past surgical history and problem list      Past Medical History:   Diagnosis Date    Benign essential hypertension     LAST ASSESSED 5/1/17; RESOLVED 5/1/17    Chronic pain disorder     Controlled insulin dependent diabetes mellitus     LAST ASSESSED 4/1/15; RESOLVED 5/2/16    Hyperlipidemia     Low back pain     Neck pain     Osteoarthritis     Pericarditis     Type 2 diabetes mellitus ([facility] [address] )      Past Surgical History:   Procedure Laterality Date    KNEE SURGERY      NERVE BLOCK Bilateral 7/19/2019    Procedure: C3 C4 C5 C6 Medial Branch Block #1 ([id number]); Surgeon: Brock Alas MD;  Location: [facility] MAIN OR;  Service: Pain Management     NERVE BLOCK Bilateral 8/2/2019    Procedure: C3 C4 C5 C6 Medial Branch Block #2 ([id number]); Surgeon: Brock Alas MD;  Location: [facility] MAIN OR;  Service: Pain Management     RADIOFREQUENCY ABLATION Right 8/16/2019    Procedure: C3 C4 C5 C6 RADIOFREQUENCY ABLATION;  Surgeon: Brock Alas MD;  Location: [facility] MAIN OR;  Service: Pain Management     RADIOFREQUENCY ABLATION Left 8/30/2019    Procedure: C3 C4 C5 C6 RADIOFREQUENCY ABLATION;  Surgeon: Brock Alas MD;  Location: [facility] MAIN OR;  Service: Pain Management     REPLACEMENT TOTAL KNEE Right     WRIST SURGERY       Family History   Problem Relation Age of Onset    Cancer Family     Cancer Mother     No Known Problems Sister     No Known Problems Brother     No Known Problems Maternal Aunt     No Known Problems Maternal Uncle     No Known Problems Paternal Aunt     No Known Problems Paternal Uncle     No Known Problems Maternal Grandmother     No Known Problems Maternal Grandfather     No Known Problems Paternal Grandmother     No Known Problems Paternal Grandfather      Medications have been verified  Objective   /87   Pulse 70   Temp 98 °F (36 7 °C)   Resp 14   SpO2 95%   No LMP for male patient  Physical Exam     Physical Exam  Vitals and nursing note reviewed  Constitutional:       General: He is not in acute distress  Appearance: He is well-developed  He is not ill-appearing or diaphoretic  HENT:      Head: Normocephalic and atraumatic  Eyes:      General: Lids are normal          Right eye: No discharge  Left eye: No discharge  Conjunctiva/sclera: Conjunctivae normal    Cardiovascular:      Rate and Rhythm: Normal rate and regular rhythm  Heart sounds: Normal heart sounds     Pulmonary:      Effort: Pulmonary effort is normal       Breath sounds: Normal breath sounds  No decreased breath sounds, wheezing, rhonchi or rales  Musculoskeletal:      Comments: Warm erythema and soft edema of the soft tissue overlying the right elbow  No streaking redness or wounds  FROM  Skin:     General: Skin is warm and dry  Neurological:      General: No focal deficit present  Mental Status: He is alert  He is not disoriented  Gait: Gait normal    Psychiatric:         Attention and Perception: Attention normal          Behavior: Behavior is cooperative  Thought Content:  Thought content normal

## 2021-09-24 ENCOUNTER — OFFICE VISIT (OUTPATIENT)
Dept: OBGYN CLINIC | Facility: CLINIC | Age: 61
End: 2021-09-24
Payer: COMMERCIAL

## 2021-09-24 VITALS
HEIGHT: 73 IN | SYSTOLIC BLOOD PRESSURE: 158 MMHG | DIASTOLIC BLOOD PRESSURE: 92 MMHG | HEART RATE: 65 BPM | BODY MASS INDEX: 29.16 KG/M2 | WEIGHT: 220 LBS

## 2021-09-24 DIAGNOSIS — M70.21 OLECRANON BURSITIS OF RIGHT ELBOW: Primary | ICD-10-CM

## 2021-09-24 PROCEDURE — 99214 OFFICE O/P EST MOD 30 MIN: CPT | Performed by: ORTHOPAEDIC SURGERY

## 2021-09-24 PROCEDURE — 3008F BODY MASS INDEX DOCD: CPT | Performed by: ORTHOPAEDIC SURGERY

## 2021-09-24 PROCEDURE — 1036F TOBACCO NON-USER: CPT | Performed by: ORTHOPAEDIC SURGERY

## 2021-09-24 RX ORDER — NAPROXEN 500 MG/1
500 TABLET ORAL 2 TIMES DAILY WITH MEALS
Qty: 60 TABLET | Refills: 0 | Status: SHIPPED | OUTPATIENT
Start: 2021-09-24 | End: 2022-02-25

## 2021-09-24 NOTE — PROGRESS NOTES
Assessment/Plan:  No diagnosis found  ***    Subjective:   Arielle Rae is a 57 y o  male who presents ***      Review of Systems      Past Medical History:   Diagnosis Date    Benign essential hypertension     LAST ASSESSED 5/1/17; RESOLVED 5/1/17    Chronic pain disorder     Controlled insulin dependent diabetes mellitus     LAST ASSESSED 4/1/15; RESOLVED 5/2/16    Hyperlipidemia     Low back pain     Neck pain     Osteoarthritis     Pericarditis     Type 2 diabetes mellitus ([facility] [address] )        Past Surgical History:   Procedure Laterality Date    KNEE SURGERY      NERVE BLOCK Bilateral 7/19/2019    Procedure: C3 C4 C5 C6 Medial Branch Block #1 ([id number]); Surgeon: Saul Spencer MD;  Location: [facility] MAIN OR;  Service: Pain Management     NERVE BLOCK Bilateral 8/2/2019    Procedure: C3 C4 C5 C6 Medial Branch Block #2 ([id number]);   Surgeon: Saul Spencer MD;  Location: [facility] MAIN OR;  Service: Pain Management     RADIOFREQUENCY ABLATION Right 8/16/2019    Procedure: C3 C4 C5 C6 RADIOFREQUENCY ABLATION;  Surgeon: Saul Spencer MD;  Location: [facility] MAIN OR;  Service: Pain Management     RADIOFREQUENCY ABLATION Left 8/30/2019    Procedure: C3 C4 C5 C6 RADIOFREQUENCY ABLATION;  Surgeon: Saul Spencer MD;  Location: [facility] MAIN OR;  Service: Pain Management     REPLACEMENT TOTAL KNEE Right     WRIST SURGERY         Family History   Problem Relation Age of Onset    Cancer Family     Cancer Mother     No Known Problems Sister     No Known Problems Brother     No Known Problems Maternal Aunt     No Known Problems Maternal Uncle     No Known Problems Paternal Aunt     No Known Problems Paternal Uncle     No Known Problems Maternal Grandmother     No Known Problems Maternal Grandfather     No Known Problems Paternal Grandmother     No Known Problems Paternal Grandfather        Social History     Occupational History    Not on file   Tobacco Use    Smoking status: Never Smoker    Smokeless tobacco: Never Used   Vaping Use    Vaping Use: Never used   Substance and Sexual Activity    Alcohol use: No    Drug use: Never    Sexual activity: Not on file         Current Outpatient Medications:     glimepiride (AMARYL) 4 mg tablet, Take 1 tablet (4 mg total) by mouth daily with breakfast, Disp: 90 tablet, Rfl: 1    glucose blood (ONETOUCH VERIO) test strip, Use as instructed 1x/d [E11 20], Disp: 100 each, Rfl: 1    lisinopril (ZESTRIL) 10 mg tablet, Take 1 tablet (10 mg total) by mouth daily, Disp: 90 tablet, Rfl: 1    metFORMIN (GLUCOPHAGE-XR) 500 mg 24 hr tablet, Take 4 tablets (2,000 mg total) by mouth daily with breakfast, Disp: 360 tablet, Rfl: 1    ONE TOUCH LANCETS MISC, by Does not apply route daily Use as instructed 1x/d [E11 20], Disp: 100 each, Rfl: 3    pravastatin (PRAVACHOL) 20 mg tablet, Take 1 tablet (20 mg total) by mouth daily, Disp: 90 tablet, Rfl: 1    sulfamethoxazole-trimethoprim (BACTRIM DS) 800-160 mg per tablet, Take 1 tablet by mouth every 12 (twelve) hours for 7 days, Disp: 14 tablet, Rfl: 0    lidocaine (LIDODERM) 5 %, Apply 1 patch topically daily Remove & Discard patch within 12 hours or as directed by MD (Patient not taking: Reported on 6/11/2021), Disp: 15 patch, Rfl: 0    mupirocin (BACTROBAN) 2 % ointment, Apply topically 3 (three) times a day (Patient not taking: Reported on 6/11/2021), Disp: 22 g, Rfl: 0    No Known Allergies    Objective:  Vitals:    09/24/21 1153   BP: 158/92   Pulse: 65       Ortho Exam    Physical Exam    I have personally reviewed pertinent films in PACS and my interpretation is as follows:  ***

## 2021-09-24 NOTE — PROGRESS NOTES
Assessment/Plan:  1  Olecranon bursitis of right elbow  naproxen (NAPROSYN) 500 mg tablet         Jonny Garduno has right-sided elbow pain consistent with olecranon bursitis  I recommended conservative measures of oral anti-inflammatory medications, icing, compression for now  Does not appear to have a septic bursitis and no signs of infection however he has started oral antibiotics and recommended continuing these until we see resolution of his symptoms  He was counseled extensively on increasing pain, warmth, fevers or chills and any of the symptoms should warrant him to call our office or proceed to the emergency room  He will follow next week to ensure that he is improving and has no signs of worsening pain or erythema  Subjective:   Mamie Funze is a 57 y o  male who presents   To the office for evaluation for right-sided elbow pain that has been present for the last 3  Months but seems to have significantly increased in the last 1 week  He recently went to urgent care for this pain and was diagnosed with cellulitis and placed on oral antibiotics  He feels like the antibiotics are not significantly helping  He does not feel like the situation is worsening but he continues to have an aching throbbing pain over the lateral aspect of his right elbow  He   Can have sharp pain if he bumps the elbow at any time  He has increased pain with movement of the arm  He denies any fevers or chills  He has not been icing, compressing or taking anti-inflammatories and only taking antibiotics  He denies any radiating pain up his arm or down into the forearm and hand  He denies any numbness in his hand  Review of Systems   Constitutional: Negative for chills, fever and unexpected weight change  HENT: Negative for hearing loss, nosebleeds and sore throat  Eyes: Negative for pain, redness and visual disturbance  Respiratory: Negative for cough, shortness of breath and wheezing      Cardiovascular: Negative for chest pain, palpitations and leg swelling  Gastrointestinal: Negative for abdominal pain, nausea and vomiting  Endocrine: Negative for polyphagia and polyuria  Genitourinary: Negative for dysuria and hematuria  Musculoskeletal:        See HPI   Skin: Negative for rash and wound  Neurological: Negative for dizziness, numbness and headaches  Psychiatric/Behavioral: Negative for decreased concentration and suicidal ideas  The patient is not nervous/anxious  Past Medical History:   Diagnosis Date    Benign essential hypertension     LAST ASSESSED 5/1/17; RESOLVED 5/1/17    Chronic pain disorder     Controlled insulin dependent diabetes mellitus     LAST ASSESSED 4/1/15; RESOLVED 5/2/16    Hyperlipidemia     Low back pain     Neck pain     Osteoarthritis     Pericarditis     Type 2 diabetes mellitus ([facility] [address] )        Past Surgical History:   Procedure Laterality Date    KNEE SURGERY      NERVE BLOCK Bilateral 7/19/2019    Procedure: C3 C4 C5 C6 Medial Branch Block #1 ([id number]); Surgeon: Jameel Hua MD;  Location: [facility] MAIN OR;  Service: Pain Management     NERVE BLOCK Bilateral 8/2/2019    Procedure: C3 C4 C5 C6 Medial Branch Block #2 ([id number]);   Surgeon: Jameel Hua MD;  Location: [facility] MAIN OR;  Service: Pain Management     RADIOFREQUENCY ABLATION Right 8/16/2019    Procedure: C3 C4 C5 C6 RADIOFREQUENCY ABLATION;  Surgeon: aJmeel Hua MD;  Location: [facility] MAIN OR;  Service: Pain Management     RADIOFREQUENCY ABLATION Left 8/30/2019    Procedure: C3 C4 C5 C6 RADIOFREQUENCY ABLATION;  Surgeon: Jameel Hua MD;  Location: [facility] MAIN OR;  Service: Pain Management     REPLACEMENT TOTAL KNEE Right     WRIST SURGERY         Family History   Problem Relation Age of Onset    Cancer Family     Cancer Mother     No Known Problems Sister     No Known Problems Brother     No Known Problems Maternal Aunt     No Known Problems Maternal Uncle     No Known Problems Paternal Aunt     No Known Problems Paternal Uncle     No Known Problems Maternal Grandmother     No Known Problems Maternal Grandfather     No Known Problems Paternal Grandmother     No Known Problems Paternal Grandfather        Social History     Occupational History    Not on file   Tobacco Use    Smoking status: Never Smoker    Smokeless tobacco: Never Used   Vaping Use    Vaping Use: Never used   Substance and Sexual Activity    Alcohol use: No    Drug use: Never    Sexual activity: Not on file         Current Outpatient Medications:     glimepiride (AMARYL) 4 mg tablet, Take 1 tablet (4 mg total) by mouth daily with breakfast, Disp: 90 tablet, Rfl: 1    glucose blood (ONETOUCH VERIO) test strip, Use as instructed 1x/d [E11 20], Disp: 100 each, Rfl: 1    lisinopril (ZESTRIL) 10 mg tablet, Take 1 tablet (10 mg total) by mouth daily, Disp: 90 tablet, Rfl: 1    metFORMIN (GLUCOPHAGE-XR) 500 mg 24 hr tablet, Take 4 tablets (2,000 mg total) by mouth daily with breakfast, Disp: 360 tablet, Rfl: 1    ONE TOUCH LANCETS MISC, by Does not apply route daily Use as instructed 1x/d [E11 20], Disp: 100 each, Rfl: 3    pravastatin (PRAVACHOL) 20 mg tablet, Take 1 tablet (20 mg total) by mouth daily, Disp: 90 tablet, Rfl: 1    sulfamethoxazole-trimethoprim (BACTRIM DS) 800-160 mg per tablet, Take 1 tablet by mouth every 12 (twelve) hours for 7 days, Disp: 14 tablet, Rfl: 0    lidocaine (LIDODERM) 5 %, Apply 1 patch topically daily Remove & Discard patch within 12 hours or as directed by MD (Patient not taking: Reported on 6/11/2021), Disp: 15 patch, Rfl: 0    mupirocin (BACTROBAN) 2 % ointment, Apply topically 3 (three) times a day (Patient not taking: Reported on 6/11/2021), Disp: 22 g, Rfl: 0    naproxen (NAPROSYN) 500 mg tablet, Take 1 tablet (500 mg total) by mouth 2 (two) times a day with meals, Disp: 60 tablet, Rfl: 0    No Known Allergies    Objective:  Vitals:    09/24/21 1153 BP: 158/92   Pulse: 65       Right Elbow Exam     Tenderness   Right elbow tenderness location: Tenderness to palpation over olecranon bursa  Slight warmth no induration  Range of Motion   Extension:  -10 abnormal   Flexion: normal   Pronation: normal   Supination: normal     Tests   Varus: negative  Valgus: negative    Other   Erythema: present  Scars: absent  Sensation: normal  Pulse: present            Physical Exam  Vitals reviewed  Constitutional:       Appearance: He is well-developed  HENT:      Head: Normocephalic and atraumatic  Eyes:      Conjunctiva/sclera: Conjunctivae normal       Pupils: Pupils are equal, round, and reactive to light  Cardiovascular:      Rate and Rhythm: Normal rate  Pulmonary:      Effort: Pulmonary effort is normal  No respiratory distress  Musculoskeletal:      Cervical back: Normal range of motion and neck supple  Comments: As noted in HPI   Skin:     General: Skin is warm and dry  Neurological:      Mental Status: He is alert and oriented to person, place, and time  Psychiatric:         Behavior: Behavior normal          I have personally reviewed pertinent films in PACS and my interpretation is as follows: Three-view  X-ray of the right elbow dated 9/21/2021 demonstrates calcific tendinitis without evidence of fracture or other acute abnormality

## 2021-10-08 ENCOUNTER — OFFICE VISIT (OUTPATIENT)
Dept: OBGYN CLINIC | Facility: CLINIC | Age: 61
End: 2021-10-08
Payer: COMMERCIAL

## 2021-10-08 VITALS
DIASTOLIC BLOOD PRESSURE: 90 MMHG | HEART RATE: 88 BPM | WEIGHT: 220 LBS | HEIGHT: 73 IN | BODY MASS INDEX: 29.16 KG/M2 | SYSTOLIC BLOOD PRESSURE: 166 MMHG

## 2021-10-08 DIAGNOSIS — M70.21 OLECRANON BURSITIS OF RIGHT ELBOW: Primary | ICD-10-CM

## 2021-10-08 PROCEDURE — 99213 OFFICE O/P EST LOW 20 MIN: CPT | Performed by: ORTHOPAEDIC SURGERY

## 2021-10-19 ENCOUNTER — TELEPHONE (OUTPATIENT)
Dept: OBGYN CLINIC | Facility: HOSPITAL | Age: 61
End: 2021-10-19

## 2021-10-20 ENCOUNTER — OFFICE VISIT (OUTPATIENT)
Dept: OBGYN CLINIC | Facility: CLINIC | Age: 61
End: 2021-10-20
Payer: COMMERCIAL

## 2021-10-20 VITALS
DIASTOLIC BLOOD PRESSURE: 82 MMHG | WEIGHT: 220 LBS | SYSTOLIC BLOOD PRESSURE: 141 MMHG | HEART RATE: 77 BPM | HEIGHT: 73 IN | BODY MASS INDEX: 29.16 KG/M2

## 2021-10-20 DIAGNOSIS — M25.521 PAIN AND SWELLING OF RIGHT ELBOW: Primary | ICD-10-CM

## 2021-10-20 DIAGNOSIS — M25.421 PAIN AND SWELLING OF RIGHT ELBOW: Primary | ICD-10-CM

## 2021-10-20 PROCEDURE — 1036F TOBACCO NON-USER: CPT | Performed by: ORTHOPAEDIC SURGERY

## 2021-10-20 PROCEDURE — 99213 OFFICE O/P EST LOW 20 MIN: CPT | Performed by: ORTHOPAEDIC SURGERY

## 2021-10-20 PROCEDURE — 3008F BODY MASS INDEX DOCD: CPT | Performed by: ORTHOPAEDIC SURGERY

## 2021-10-30 ENCOUNTER — HOSPITAL ENCOUNTER (EMERGENCY)
Facility: HOSPITAL | Age: 61
Discharge: HOME | End: 2021-10-30
Attending: EMERGENCY MEDICINE | Admitting: EMERGENCY MEDICINE
Payer: OTHER MISCELLANEOUS

## 2021-10-30 VITALS
OXYGEN SATURATION: 95 % | DIASTOLIC BLOOD PRESSURE: 103 MMHG | HEART RATE: 75 BPM | BODY MASS INDEX: 31 KG/M2 | TEMPERATURE: 97 F | RESPIRATION RATE: 18 BRPM | SYSTOLIC BLOOD PRESSURE: 182 MMHG | WEIGHT: 235 LBS

## 2021-10-30 DIAGNOSIS — S61.419A HAND LACERATION: Primary | ICD-10-CM

## 2021-10-30 PROCEDURE — 99282 EMERGENCY DEPT VISIT SF MDM: CPT

## 2021-10-30 PROCEDURE — 90471 IMMUNIZATION ADMIN: CPT

## 2021-10-30 PROCEDURE — 12002 RPR S/N/AX/GEN/TRNK2.6-7.5CM: CPT | Performed by: EMERGENCY MEDICINE

## 2021-10-30 PROCEDURE — 90715 TDAP VACCINE 7 YRS/> IM: CPT

## 2021-10-30 PROCEDURE — 99282 EMERGENCY DEPT VISIT SF MDM: CPT | Performed by: EMERGENCY MEDICINE

## 2021-10-30 RX ORDER — LIDOCAINE HYDROCHLORIDE 10 MG/ML
5 INJECTION, SOLUTION EPIDURAL; INFILTRATION; INTRACAUDAL; PERINEURAL ONCE
Status: COMPLETED | OUTPATIENT
Start: 2021-10-30 | End: 2021-10-30

## 2021-10-30 RX ORDER — GINSENG 100 MG
1 CAPSULE ORAL ONCE
Status: COMPLETED | OUTPATIENT
Start: 2021-10-30 | End: 2021-10-30

## 2021-10-30 RX ADMIN — BACITRACIN 1 SMALL APPLICATION: 500 OINTMENT TOPICAL at 02:39

## 2021-10-30 RX ADMIN — LIDOCAINE HYDROCHLORIDE 5 ML: 10 INJECTION, SOLUTION EPIDURAL; INFILTRATION; INTRACAUDAL; PERINEURAL at 02:40

## 2021-10-30 RX ADMIN — TETANUS TOXOID, REDUCED DIPHTHERIA TOXOID AND ACELLULAR PERTUSSIS VACCINE, ADSORBED 0.5 ML: 5; 2.5; 8; 8; 2.5 SUSPENSION INTRAMUSCULAR at 02:39

## 2021-11-08 ENCOUNTER — OCCMED (OUTPATIENT)
Dept: URGENT CARE | Facility: CLINIC | Age: 61
End: 2021-11-08
Payer: OTHER MISCELLANEOUS

## 2021-11-08 DIAGNOSIS — Z48.02 ENCOUNTER FOR REMOVAL OF SUTURES: Primary | ICD-10-CM

## 2021-11-08 PROCEDURE — 99203 OFFICE O/P NEW LOW 30 MIN: CPT | Performed by: PHYSICIAN ASSISTANT

## 2021-11-16 ENCOUNTER — HOSPITAL ENCOUNTER (OUTPATIENT)
Dept: RADIOLOGY | Facility: HOSPITAL | Age: 61
Discharge: HOME | End: 2021-11-16
Attending: ORTHOPAEDIC SURGERY
Payer: COMMERCIAL

## 2021-11-16 DIAGNOSIS — M25.421 PAIN AND SWELLING OF RIGHT ELBOW: ICD-10-CM

## 2021-11-16 DIAGNOSIS — M25.521 PAIN AND SWELLING OF RIGHT ELBOW: ICD-10-CM

## 2021-11-16 PROCEDURE — G1004 CDSM NDSC: HCPCS

## 2021-11-16 PROCEDURE — 73221 MRI JOINT UPR EXTREM W/O DYE: CPT

## 2021-11-23 ENCOUNTER — OFFICE VISIT (OUTPATIENT)
Dept: OBGYN CLINIC | Facility: CLINIC | Age: 61
End: 2021-11-23
Payer: COMMERCIAL

## 2021-11-23 VITALS
SYSTOLIC BLOOD PRESSURE: 131 MMHG | DIASTOLIC BLOOD PRESSURE: 84 MMHG | WEIGHT: 235 LBS | HEIGHT: 73 IN | BODY MASS INDEX: 31.14 KG/M2 | HEART RATE: 72 BPM | TEMPERATURE: 98 F

## 2021-11-23 DIAGNOSIS — M70.21 OLECRANON BURSITIS OF RIGHT ELBOW: Primary | ICD-10-CM

## 2021-11-23 PROCEDURE — 3008F BODY MASS INDEX DOCD: CPT | Performed by: ORTHOPAEDIC SURGERY

## 2021-11-23 PROCEDURE — 1036F TOBACCO NON-USER: CPT | Performed by: ORTHOPAEDIC SURGERY

## 2021-11-23 PROCEDURE — 99213 OFFICE O/P EST LOW 20 MIN: CPT | Performed by: ORTHOPAEDIC SURGERY

## 2021-12-02 ENCOUNTER — OFFICE VISIT (OUTPATIENT)
Dept: OBGYN CLINIC | Facility: CLINIC | Age: 61
End: 2021-12-02
Payer: COMMERCIAL

## 2021-12-02 VITALS
HEIGHT: 73 IN | WEIGHT: 230.6 LBS | BODY MASS INDEX: 30.56 KG/M2 | SYSTOLIC BLOOD PRESSURE: 129 MMHG | HEART RATE: 82 BPM | DIASTOLIC BLOOD PRESSURE: 73 MMHG

## 2021-12-02 DIAGNOSIS — N18.2 TYPE 2 DIABETES MELLITUS WITH STAGE 2 CHRONIC KIDNEY DISEASE, WITHOUT LONG-TERM CURRENT USE OF INSULIN (HCC): ICD-10-CM

## 2021-12-02 DIAGNOSIS — E11.22 TYPE 2 DIABETES MELLITUS WITH STAGE 2 CHRONIC KIDNEY DISEASE, WITHOUT LONG-TERM CURRENT USE OF INSULIN (HCC): ICD-10-CM

## 2021-12-02 DIAGNOSIS — M25.562 ACUTE PAIN OF LEFT KNEE: ICD-10-CM

## 2021-12-02 DIAGNOSIS — M17.12 PRIMARY OSTEOARTHRITIS OF LEFT KNEE: Primary | ICD-10-CM

## 2021-12-02 PROCEDURE — 3008F BODY MASS INDEX DOCD: CPT | Performed by: ORTHOPAEDIC SURGERY

## 2021-12-02 PROCEDURE — 3066F NEPHROPATHY DOC TX: CPT | Performed by: ORTHOPAEDIC SURGERY

## 2021-12-02 PROCEDURE — 99213 OFFICE O/P EST LOW 20 MIN: CPT | Performed by: ORTHOPAEDIC SURGERY

## 2021-12-02 PROCEDURE — 20610 DRAIN/INJ JOINT/BURSA W/O US: CPT | Performed by: ORTHOPAEDIC SURGERY

## 2021-12-02 PROCEDURE — 1036F TOBACCO NON-USER: CPT | Performed by: ORTHOPAEDIC SURGERY

## 2021-12-02 RX ORDER — TRIAMCINOLONE ACETONIDE 40 MG/ML
80 INJECTION, SUSPENSION INTRA-ARTICULAR; INTRAMUSCULAR
Status: COMPLETED | OUTPATIENT
Start: 2021-12-02 | End: 2021-12-02

## 2021-12-02 RX ORDER — BUPIVACAINE HYDROCHLORIDE 5 MG/ML
6 INJECTION, SOLUTION EPIDURAL; INTRACAUDAL
Status: COMPLETED | OUTPATIENT
Start: 2021-12-02 | End: 2021-12-02

## 2021-12-02 RX ADMIN — TRIAMCINOLONE ACETONIDE 80 MG: 40 INJECTION, SUSPENSION INTRA-ARTICULAR; INTRAMUSCULAR at 10:42

## 2021-12-02 RX ADMIN — BUPIVACAINE HYDROCHLORIDE 6 ML: 5 INJECTION, SOLUTION EPIDURAL; INTRACAUDAL at 10:42

## 2021-12-06 ENCOUNTER — TELEPHONE (OUTPATIENT)
Dept: OBGYN CLINIC | Facility: CLINIC | Age: 61
End: 2021-12-06

## 2022-01-04 ENCOUNTER — IMMUNIZATIONS (OUTPATIENT)
Dept: FAMILY MEDICINE CLINIC | Facility: HOSPITAL | Age: 62
End: 2022-01-04

## 2022-01-04 DIAGNOSIS — Z23 ENCOUNTER FOR IMMUNIZATION: Primary | ICD-10-CM

## 2022-01-04 PROCEDURE — 91306 COVID-19 MODERNA VACC 0.25 ML BOOSTER: CPT

## 2022-01-04 PROCEDURE — 0064A COVID-19 MODERNA VACC 0.25 ML BOOSTER: CPT

## 2022-02-02 ENCOUNTER — OFFICE VISIT (OUTPATIENT)
Dept: OBGYN CLINIC | Facility: CLINIC | Age: 62
End: 2022-02-02
Payer: COMMERCIAL

## 2022-02-02 VITALS
DIASTOLIC BLOOD PRESSURE: 88 MMHG | SYSTOLIC BLOOD PRESSURE: 142 MMHG | BODY MASS INDEX: 29.69 KG/M2 | WEIGHT: 224 LBS | HEIGHT: 73 IN | HEART RATE: 90 BPM

## 2022-02-02 DIAGNOSIS — N18.2 TYPE 2 DIABETES MELLITUS WITH STAGE 2 CHRONIC KIDNEY DISEASE, WITHOUT LONG-TERM CURRENT USE OF INSULIN (HCC): ICD-10-CM

## 2022-02-02 DIAGNOSIS — M25.562 ACUTE PAIN OF LEFT KNEE: ICD-10-CM

## 2022-02-02 DIAGNOSIS — E11.22 TYPE 2 DIABETES MELLITUS WITH STAGE 2 CHRONIC KIDNEY DISEASE, WITHOUT LONG-TERM CURRENT USE OF INSULIN (HCC): ICD-10-CM

## 2022-02-02 DIAGNOSIS — M17.12 PRIMARY OSTEOARTHRITIS OF LEFT KNEE: Primary | ICD-10-CM

## 2022-02-02 PROCEDURE — 99213 OFFICE O/P EST LOW 20 MIN: CPT | Performed by: ORTHOPAEDIC SURGERY

## 2022-02-02 PROCEDURE — 3066F NEPHROPATHY DOC TX: CPT | Performed by: ORTHOPAEDIC SURGERY

## 2022-02-02 PROCEDURE — 1036F TOBACCO NON-USER: CPT | Performed by: ORTHOPAEDIC SURGERY

## 2022-02-02 NOTE — PROGRESS NOTES
Assessment/Plan:  1  Primary osteoarthritis of left knee  Injection Procedure Prior Authorization   2  Acute pain of left knee  Injection Procedure Prior Authorization   3  Type 2 diabetes mellitus with stage 2 chronic kidney disease, without long-term current use of insulin ([address] )  Injection Procedure Prior Nehemias Rabago is a pleasant 65-year-old gentleman presenting today for follow-up of his activity related left knee pain due to his severe underlying osteoarthritis  Unfortunately, he is now demonstrating failure of cortisone injection therapy and continues to be limited in his ability to perform activities of daily living due to his left knee pain  Therefore, we will submit for authorization of viscosupplementation for his left knee  He may return to initiate series when it is improved  In the interim, he may continue with ice and Tylenol or Advil as needed  He expressed understanding all of his questions were addressed    Subjective: Left knee follow up    Patient ID: Adilson Mcdowell  is a 57 y o  male  Kyle is a pleasant 57year old gentleman presenting today for follow-up 2 months from his previous appointment for his activity related left knee pain and underlying osteoarthritis  He reports that unfortunately, the cortisone injection failed to provide significant long-lasting relief as it had previously  He continues to have pain with prolonged sitting and pain at nighttime especially of the medial knee  While walking, he does not have significant discomfort  He denies any new injuries or feelings of instability  Review of Systems   Constitutional: Positive for activity change  HENT: Negative  Eyes: Negative  Respiratory: Negative  Cardiovascular: Negative  Gastrointestinal: Negative  Endocrine: Negative  Genitourinary: Negative  Musculoskeletal: Positive for arthralgias, gait problem, joint swelling and myalgias  Skin: Negative      Allergic/Immunologic: Negative  Hematological: Negative  Psychiatric/Behavioral: Negative  Past Medical History:   Diagnosis Date    Benign essential hypertension     LAST ASSESSED 5/1/17; RESOLVED 5/1/17    Chronic pain disorder     Controlled insulin dependent diabetes mellitus     LAST ASSESSED 4/1/15; RESOLVED 5/2/16    Diabetes mellitus ([facility] [address] ) Long time ago    Hyperlipidemia     Low back pain     Neck pain     Osteoarthritis     Pericarditis     Type 2 diabetes mellitus ([facility] [address] )        Past Surgical History:   Procedure Laterality Date    KNEE SURGERY      NERVE BLOCK Bilateral 7/19/2019    Procedure: C3 C4 C5 C6 Medial Branch Block #1 ([id number]); Surgeon: Jovita Oseguera MD;  Location: [facility] MAIN OR;  Service: Pain Management     NERVE BLOCK Bilateral 8/2/2019    Procedure: C3 C4 C5 C6 Medial Branch Block #2 ([id number]);   Surgeon: Jovita Oseguera MD;  Location: [facility] MAIN OR;  Service: Pain Management     RADIOFREQUENCY ABLATION Right 8/16/2019    Procedure: C3 C4 C5 C6 RADIOFREQUENCY ABLATION;  Surgeon: Jovita Oseguera MD;  Location: [place] [address] MAIN OR;  Service: Pain Management     RADIOFREQUENCY ABLATION Left 8/30/2019    Procedure: C3 C4 C5 C6 RADIOFREQUENCY ABLATION;  Surgeon: Jovita Oseguera MD;  Location: [place] [address] MAIN OR;  Service: Pain Management     REPLACEMENT TOTAL KNEE Right     WRIST SURGERY         Family History   Problem Relation Age of Onset    Cancer Family     Cancer Mother     Cancer Father     No Known Problems Sister     Cancer Brother     No Known Problems Maternal Aunt     No Known Problems Maternal Uncle     No Known Problems Paternal Aunt     No Known Problems Paternal Uncle     No Known Problems Maternal Grandmother     No Known Problems Maternal Grandfather     No Known Problems Paternal Grandmother     No Known Problems Paternal Grandfather     Diabetes Brother        Social History     Occupational History    Not on file   Tobacco Use    Smoking status: Never Smoker    Smokeless tobacco: Never Used   Vaping Use    Vaping Use: Never used   Substance and Sexual Activity    Alcohol use: No    Drug use: Never    Sexual activity: Not Currently     Partners: Female         Current Outpatient Medications:     glimepiride (AMARYL) 4 mg tablet, Take 1 tablet (4 mg total) by mouth daily with breakfast, Disp: 90 tablet, Rfl: 1    glucose blood (ONETOUCH VERIO) test strip, Use as instructed 1x/d [E11 20], Disp: 100 each, Rfl: 1    lisinopril (ZESTRIL) 10 mg tablet, Take 1 tablet (10 mg total) by mouth daily, Disp: 90 tablet, Rfl: 1    metFORMIN (GLUCOPHAGE-XR) 500 mg 24 hr tablet, Take 4 tablets (2,000 mg total) by mouth daily with breakfast, Disp: 360 tablet, Rfl: 1    naproxen (NAPROSYN) 500 mg tablet, Take 1 tablet (500 mg total) by mouth 2 (two) times a day with meals, Disp: 60 tablet, Rfl: 0    ONE TOUCH LANCETS MISC, by Does not apply route daily Use as instructed 1x/d [E11 20], Disp: 100 each, Rfl: 3    pravastatin (PRAVACHOL) 20 mg tablet, Take 1 tablet (20 mg total) by mouth daily, Disp: 90 tablet, Rfl: 1    No Known Allergies    Objective:  Vitals:    02/02/22 1405   BP: 142/88   Pulse: 90       Body mass index is 29 55 kg/m²  Left Knee Exam     Muscle Strength   The patient has normal left knee strength  Tenderness   The patient is experiencing tenderness in the medial joint line, lateral joint line and MCL      Range of Motion   Extension:  0 normal   Flexion:  120 normal     Tests   Varus: negative Valgus: negative  Drawer:  Anterior - negative       Patellar apprehension: negative    Other   Erythema: absent  Scars: absent  Sensation: normal  Pulse: present  Swelling: none  Effusion: no effusion present    Comments:  Stable knee on ligamentous exam at 0, 30, 90 degrees  Peripatellar Crepitus with positive patellofemoral grind   thigh calf soft nontender   ambulates with a slightly antalgic gait            Observations   Left Knee Negative for effusion  Physical Exam  Vitals and nursing note reviewed  Constitutional:       Appearance: He is well-developed  Comments: Body mass index is 29 55 kg/m²  HENT:      Head: Normocephalic and atraumatic  Right Ear: External ear normal       Left Ear: External ear normal    Cardiovascular:      Rate and Rhythm: Normal rate  Pulmonary:      Effort: Pulmonary effort is normal    Abdominal:      Palpations: Abdomen is soft  Musculoskeletal:      Cervical back: Normal range of motion  Left knee: No effusion  Comments: See ortho exam   Skin:     General: Skin is warm and dry  Neurological:      General: No focal deficit present  Mental Status: He is alert and oriented to person, place, and time  Mental status is at baseline  Psychiatric:         Mood and Affect: Mood normal          Behavior: Behavior normal          Thought Content:  Thought content normal          Judgment: Judgment normal

## 2022-02-11 ENCOUNTER — PROCEDURE VISIT (OUTPATIENT)
Dept: OBGYN CLINIC | Facility: CLINIC | Age: 62
End: 2022-02-11
Payer: COMMERCIAL

## 2022-02-11 DIAGNOSIS — M17.12 PRIMARY OSTEOARTHRITIS OF LEFT KNEE: Primary | ICD-10-CM

## 2022-02-11 DIAGNOSIS — M25.562 CHRONIC PAIN OF LEFT KNEE: ICD-10-CM

## 2022-02-11 DIAGNOSIS — G89.29 CHRONIC PAIN OF LEFT KNEE: ICD-10-CM

## 2022-02-11 PROCEDURE — 20610 DRAIN/INJ JOINT/BURSA W/O US: CPT | Performed by: ORTHOPAEDIC SURGERY

## 2022-02-11 NOTE — PROGRESS NOTES
Assessment/Plan:  1  Primary osteoarthritis of left knee  Large joint arthrocentesis   2  Chronic pain of left knee  Large joint arthrocentesis     Vladislav Cuenca is a pleasant 41-year-old gentleman presenting today for the 1st of 3 Orthovisc injections for his left knee osteoarthritis and activity related pain  He consented to and underwent the injection as detailed below, which he tolerated well without difficulty or complication  Post injection instructions were provided  We will see him back next week and a week after to complete the series  Large joint arthrocentesis: L knee  Universal Protocol:  Consent: Verbal consent obtained  Risks and benefits: risks, benefits and alternatives were discussed  Consent given by: patient  Time out: Immediately prior to procedure a "time out" was called to verify the correct patient, procedure, equipment, support staff and site/side marked as required  Timeout called at: 2/11/2022 11:15 AM   Site marked: the operative site was marked  Patient identity confirmed: verbally with patient    Supporting Documentation  Indications: pain   Procedure Details  Location: knee - L knee  Preparation: Patient was prepped and draped in the usual sterile fashion  Needle size: 20 G  Ultrasound guidance: no  Approach: anterolateral  Medications administered: 30 mg sodium hyaluronate 30 mg/2 mL    Patient tolerance: patient tolerated the procedure well with no immediate complications  Dressing:  Sterile dressing applied        Subjective: Left knee Orthovisc #1    Patient ID: Sridhar Arguello  is a 57 y o  male  Vladislav Cuenca is a pleasant 41-year-old gentleman presenting today for the 1st of 3 Orthovisc injections for his left knee osteoarthritis and activity related pain    He denies any new injuries from his previous visit    Review of Systems    Past Medical History:   Diagnosis Date    Benign essential hypertension     LAST ASSESSED 5/1/17; RESOLVED 5/1/17    Chronic pain disorder     Controlled insulin dependent diabetes mellitus     LAST ASSESSED 4/1/15; RESOLVED 5/2/16    Diabetes mellitus ([facility] [address] ) Long time ago    Hyperlipidemia     Low back pain     Neck pain     Osteoarthritis     Pericarditis     Type 2 diabetes mellitus ([facility] [address] )        Past Surgical History:   Procedure Laterality Date    KNEE SURGERY      NERVE BLOCK Bilateral 7/19/2019    Procedure: C3 C4 C5 C6 Medial Branch Block #1 ([id number]); Surgeon: Vanessa Petty MD;  Location: [facility] MAIN OR;  Service: Pain Management     NERVE BLOCK Bilateral 8/2/2019    Procedure: C3 C4 C5 C6 Medial Branch Block #2 ([id number]);   Surgeon: Vanessa Petty MD;  Location: [facility] MAIN OR;  Service: Pain Management     RADIOFREQUENCY ABLATION Right 8/16/2019    Procedure: C3 C4 C5 C6 RADIOFREQUENCY ABLATION;  Surgeon: Vanessa Petty MD;  Location: [facility] MAIN OR;  Service: Pain Management     RADIOFREQUENCY ABLATION Left 8/30/2019    Procedure: C3 C4 C5 C6 RADIOFREQUENCY ABLATION;  Surgeon: Vanessa Petty MD;  Location: [facility] MAIN OR;  Service: Pain Management     REPLACEMENT TOTAL KNEE Right     WRIST SURGERY         Family History   Problem Relation Age of Onset    Cancer Family     Cancer Mother     Cancer Father     No Known Problems Sister     Cancer Brother     No Known Problems Maternal Aunt     No Known Problems Maternal Uncle     No Known Problems Paternal Aunt     No Known Problems Paternal Uncle     No Known Problems Maternal Grandmother     No Known Problems Maternal Grandfather     No Known Problems Paternal Grandmother     No Known Problems Paternal Grandfather     Diabetes Brother        Social History     Occupational History    Not on file   Tobacco Use    Smoking status: Never Smoker    Smokeless tobacco: Never Used   Vaping Use    Vaping Use: Never used   Substance and Sexual Activity    Alcohol use: No    Drug use: Never    Sexual activity: Not Currently     Partners: Female Current Outpatient Medications:     glimepiride (AMARYL) 4 mg tablet, Take 1 tablet (4 mg total) by mouth daily with breakfast, Disp: 90 tablet, Rfl: 1    glucose blood (ONETOUCH VERIO) test strip, Use as instructed 1x/d [E11 20], Disp: 100 each, Rfl: 1    lisinopril (ZESTRIL) 10 mg tablet, Take 1 tablet (10 mg total) by mouth daily, Disp: 90 tablet, Rfl: 1    metFORMIN (GLUCOPHAGE-XR) 500 mg 24 hr tablet, Take 4 tablets (2,000 mg total) by mouth daily with breakfast, Disp: 360 tablet, Rfl: 1    naproxen (NAPROSYN) 500 mg tablet, Take 1 tablet (500 mg total) by mouth 2 (two) times a day with meals, Disp: 60 tablet, Rfl: 0    ONE TOUCH LANCETS MISC, by Does not apply route daily Use as instructed 1x/d [E11 20], Disp: 100 each, Rfl: 3    pravastatin (PRAVACHOL) 20 mg tablet, Take 1 tablet (20 mg total) by mouth daily, Disp: 90 tablet, Rfl: 1    No Known Allergies    Objective: There were no vitals filed for this visit  There is no height or weight on file to calculate BMI      Ortho Exam    Physical Exam

## 2022-02-18 ENCOUNTER — PROCEDURE VISIT (OUTPATIENT)
Dept: OBGYN CLINIC | Facility: CLINIC | Age: 62
End: 2022-02-18
Payer: COMMERCIAL

## 2022-02-18 VITALS
HEART RATE: 77 BPM | HEIGHT: 73 IN | BODY MASS INDEX: 30.09 KG/M2 | RESPIRATION RATE: 17 BRPM | WEIGHT: 227 LBS | SYSTOLIC BLOOD PRESSURE: 122 MMHG | DIASTOLIC BLOOD PRESSURE: 80 MMHG

## 2022-02-18 DIAGNOSIS — M17.12 PRIMARY OSTEOARTHRITIS OF LEFT KNEE: Primary | ICD-10-CM

## 2022-02-18 DIAGNOSIS — M25.562 CHRONIC PAIN OF LEFT KNEE: ICD-10-CM

## 2022-02-18 DIAGNOSIS — G89.29 CHRONIC PAIN OF LEFT KNEE: ICD-10-CM

## 2022-02-18 PROCEDURE — 20610 DRAIN/INJ JOINT/BURSA W/O US: CPT | Performed by: ORTHOPAEDIC SURGERY

## 2022-02-18 PROCEDURE — 3008F BODY MASS INDEX DOCD: CPT | Performed by: ORTHOPAEDIC SURGERY

## 2022-02-18 NOTE — LETTER
February 18, 2022     Patient: Manjula Velazquez YOB: 1960   Date of Visit: 2/18/2022       To Whom it May Concern:    Alondra Ag is under my professional care  He was seen in my office on 2/18/2022  He may return to work on 2/21/22 without restrictions  He should remain out of work until that time       If you have any questions or concerns, please don't hesitate to call           Sincerely,          Cathie Thompson,         CC: No Recipients

## 2022-02-18 NOTE — PROGRESS NOTES
Assessment/Plan:  1  Primary osteoarthritis of left knee  Large joint arthrocentesis   2  Chronic pain of left knee  Large joint arthrocentesis     Klaus Navarro is a pleasant 40-year-old gentleman presenting today for the 2nd of 3 Orthovisc injections for his left knee osteoarthritis and activity related pain  He consented to and underwent the injection as detailed below, which he tolerated well without difficulty or complication  Post injection instructions were provided  We will see him back next week to complete the series  Large joint arthrocentesis: L knee  Universal Protocol:  Consent: Verbal consent obtained  Risks and benefits: risks, benefits and alternatives were discussed  Consent given by: patient  Time out: Immediately prior to procedure a "time out" was called to verify the correct patient, procedure, equipment, support staff and site/side marked as required  Timeout called at: 2/18/2022 11:35 AM   Site marked: the operative site was marked  Patient identity confirmed: verbally with patient    Supporting Documentation  Indications: pain   Procedure Details  Location: knee - L knee  Preparation: Patient was prepped and draped in the usual sterile fashion  Needle size: 20 G  Ultrasound guidance: no  Approach: anterolateral  Medications administered: 30 mg sodium hyaluronate 30 mg/2 mL    Patient tolerance: patient tolerated the procedure well with no immediate complications  Dressing:  Sterile dressing applied        Subjective: Left knee Orthovisc #2    Patient ID: Marcie Trinidad  is a 57 y o  male  Klaus Navarro is a pleasant 40-year-old gentleman presenting today for the 2nd of 3 Orthovisc injections for his left knee osteoarthritis and activity related pain    He denies any new injuries from his previous visit    Review of Systems    Past Medical History:   Diagnosis Date    Benign essential hypertension     LAST ASSESSED 5/1/17; RESOLVED 5/1/17    Chronic pain disorder     Controlled insulin dependent diabetes mellitus     LAST ASSESSED 4/1/15; RESOLVED 5/2/16    Diabetes mellitus ([facility] [address] ) Long time ago    Hyperlipidemia     Low back pain     Neck pain     Osteoarthritis     Pericarditis     Type 2 diabetes mellitus ([facility] [address] )        Past Surgical History:   Procedure Laterality Date    KNEE SURGERY      NERVE BLOCK Bilateral 7/19/2019    Procedure: C3 C4 C5 C6 Medial Branch Block #1 ([id number]); Surgeon: Daphne Strickland MD;  Location: [facility] MAIN OR;  Service: Pain Management     NERVE BLOCK Bilateral 8/2/2019    Procedure: C3 C4 C5 C6 Medial Branch Block #2 ([id number]);   Surgeon: Daphne Strickland MD;  Location: [facility] MAIN OR;  Service: Pain Management     RADIOFREQUENCY ABLATION Right 8/16/2019    Procedure: C3 C4 C5 C6 RADIOFREQUENCY ABLATION;  Surgeon: Daphne Strickland MD;  Location: [facility] MAIN OR;  Service: Pain Management     RADIOFREQUENCY ABLATION Left 8/30/2019    Procedure: C3 C4 C5 C6 RADIOFREQUENCY ABLATION;  Surgeon: Daphne Strickland MD;  Location: [facility] MAIN OR;  Service: Pain Management     REPLACEMENT TOTAL KNEE Right     WRIST SURGERY         Family History   Problem Relation Age of Onset    Cancer Family     Cancer Mother     Cancer Father     No Known Problems Sister     Cancer Brother     No Known Problems Maternal Aunt     No Known Problems Maternal Uncle     No Known Problems Paternal Aunt     No Known Problems Paternal Uncle     No Known Problems Maternal Grandmother     No Known Problems Maternal Grandfather     No Known Problems Paternal Grandmother     No Known Problems Paternal Grandfather     Diabetes Brother        Social History     Occupational History    Not on file   Tobacco Use    Smoking status: Never Smoker    Smokeless tobacco: Never Used   Vaping Use    Vaping Use: Never used   Substance and Sexual Activity    Alcohol use: No    Drug use: Never    Sexual activity: Not Currently     Partners: Female         Current Outpatient Medications:     glimepiride (AMARYL) 4 mg tablet, Take 1 tablet (4 mg total) by mouth daily with breakfast, Disp: 90 tablet, Rfl: 1    glucose blood (ONETOUCH VERIO) test strip, Use as instructed 1x/d [E11 20], Disp: 100 each, Rfl: 1    lisinopril (ZESTRIL) 10 mg tablet, Take 1 tablet (10 mg total) by mouth daily, Disp: 90 tablet, Rfl: 1    metFORMIN (GLUCOPHAGE-XR) 500 mg 24 hr tablet, Take 4 tablets (2,000 mg total) by mouth daily with breakfast, Disp: 360 tablet, Rfl: 1    ONE TOUCH LANCETS MISC, by Does not apply route daily Use as instructed 1x/d [E11 20], Disp: 100 each, Rfl: 3    pravastatin (PRAVACHOL) 20 mg tablet, Take 1 tablet (20 mg total) by mouth daily, Disp: 90 tablet, Rfl: 1    naproxen (NAPROSYN) 500 mg tablet, Take 1 tablet (500 mg total) by mouth 2 (two) times a day with meals (Patient not taking: Reported on 2/18/2022 ), Disp: 60 tablet, Rfl: 0    No Known Allergies    Objective:  Vitals:    02/18/22 1125   BP: 122/80   Pulse: 77   Resp: 17       Body mass index is 29 95 kg/m²      Ortho Exam    Physical Exam

## 2022-02-25 ENCOUNTER — PROCEDURE VISIT (OUTPATIENT)
Dept: OBGYN CLINIC | Facility: CLINIC | Age: 62
End: 2022-02-25
Payer: COMMERCIAL

## 2022-02-25 DIAGNOSIS — M17.12 PRIMARY OSTEOARTHRITIS OF LEFT KNEE: Primary | ICD-10-CM

## 2022-02-25 DIAGNOSIS — M25.562 CHRONIC PAIN OF LEFT KNEE: ICD-10-CM

## 2022-02-25 DIAGNOSIS — G89.29 CHRONIC PAIN OF LEFT KNEE: ICD-10-CM

## 2022-02-25 PROCEDURE — 20610 DRAIN/INJ JOINT/BURSA W/O US: CPT | Performed by: ORTHOPAEDIC SURGERY

## 2022-02-25 NOTE — LETTER
February 25, 2022     Patient: Harshil Tang YOB: 1960   Date of Visit: 2/25/2022       To Whom it May Concern:    Ca Girard is under my professional care  He was seen in my office on 2/25/2022  He should be excused from work tonight  He may return without restrictions on 2/28/22  If you have any questions or concerns, please don't hesitate to call           Sincerely,          Ortiz Mcgrath DO        CC: No Recipients

## 2022-02-25 NOTE — PROGRESS NOTES
Assessment/Plan:  1  Primary osteoarthritis of left knee  Large joint arthrocentesis   2  Chronic pain of left knee  Large joint arthrocentesis     Jian Cramer is a pleasant 40-year-old gentleman presenting today for the 3rd of 3 Orthovisc injections for his left knee osteoarthritis and activity related pain  He consented to and underwent the injection as detailed below, which he tolerated well without difficulty or complication  Post injection instructions were provided  We will see him back in 6 months  Large joint arthrocentesis: L knee  Universal Protocol:  Consent: Verbal consent obtained  Risks and benefits: risks, benefits and alternatives were discussed  Consent given by: patient  Time out: Immediately prior to procedure a "time out" was called to verify the correct patient, procedure, equipment, support staff and site/side marked as required  Timeout called at: 2/25/2022 11:34 AM   Site marked: the operative site was marked  Patient identity confirmed: verbally with patient    Supporting Documentation  Indications: pain   Procedure Details  Location: knee - L knee  Preparation: Patient was prepped and draped in the usual sterile fashion  Needle size: 20 G  Ultrasound guidance: no  Approach: anterolateral  Medications administered: 30 mg sodium hyaluronate 30 mg/2 mL    Patient tolerance: patient tolerated the procedure well with no immediate complications  Dressing:  Sterile dressing applied        Subjective: Left knee Orthovisc #3    Patient ID: Mag Mena  is a 57 y o  male  Jian Cramer is a pleasant 40-year-old gentleman presenting today for the 3rd of 3 Orthovisc injections for his left knee osteoarthritis and activity related pain    He denies any new injuries from his previous visit    Review of Systems    Past Medical History:   Diagnosis Date    Benign essential hypertension     LAST ASSESSED 5/1/17; RESOLVED 5/1/17    Chronic pain disorder     Controlled insulin dependent diabetes mellitus LAST ASSESSED 4/1/15; RESOLVED 5/2/16    Diabetes mellitus ([facility] [address] ) Long time ago    Hyperlipidemia     Low back pain     Neck pain     Osteoarthritis     Pericarditis     Type 2 diabetes mellitus ([facility] [address] )        Past Surgical History:   Procedure Laterality Date    KNEE SURGERY      NERVE BLOCK Bilateral 7/19/2019    Procedure: C3 C4 C5 C6 Medial Branch Block #1 ([id number]); Surgeon: Suze Angulo MD;  Location: [facility] MAIN OR;  Service: Pain Management     NERVE BLOCK Bilateral 8/2/2019    Procedure: C3 C4 C5 C6 Medial Branch Block #2 ([id number]);   Surgeon: Suze Angulo MD;  Location: [facility] MAIN OR;  Service: Pain Management     RADIOFREQUENCY ABLATION Right 8/16/2019    Procedure: C3 C4 C5 C6 RADIOFREQUENCY ABLATION;  Surgeon: Suze Angulo MD;  Location: [place] [address] MAIN OR;  Service: Pain Management     RADIOFREQUENCY ABLATION Left 8/30/2019    Procedure: C3 C4 C5 C6 RADIOFREQUENCY ABLATION;  Surgeon: Suze Angulo MD;  Location: [place] [address] MAIN OR;  Service: Pain Management     REPLACEMENT TOTAL KNEE Right     WRIST SURGERY         Family History   Problem Relation Age of Onset    Cancer Family     Cancer Mother     Cancer Father     No Known Problems Sister     Cancer Brother     No Known Problems Maternal Aunt     No Known Problems Maternal Uncle     No Known Problems Paternal Aunt     No Known Problems Paternal Uncle     No Known Problems Maternal Grandmother     No Known Problems Maternal Grandfather     No Known Problems Paternal Grandmother     No Known Problems Paternal Grandfather     Diabetes Brother        Social History     Occupational History    Not on file   Tobacco Use    Smoking status: Never Smoker    Smokeless tobacco: Never Used   Vaping Use    Vaping Use: Never used   Substance and Sexual Activity    Alcohol use: No    Drug use: Never    Sexual activity: Not Currently     Partners: Female         Current Outpatient Medications:     glimepiride (AMARYL) 4 mg tablet, Take 1 tablet (4 mg total) by mouth daily with breakfast, Disp: 90 tablet, Rfl: 1    glucose blood (ONETOUCH VERIO) test strip, Use as instructed 1x/d [E11 20], Disp: 100 each, Rfl: 1    lisinopril (ZESTRIL) 10 mg tablet, Take 1 tablet (10 mg total) by mouth daily, Disp: 90 tablet, Rfl: 1    metFORMIN (GLUCOPHAGE-XR) 500 mg 24 hr tablet, Take 4 tablets (2,000 mg total) by mouth daily with breakfast, Disp: 360 tablet, Rfl: 1    ONE TOUCH LANCETS MISC, by Does not apply route daily Use as instructed 1x/d [E11 20], Disp: 100 each, Rfl: 3    pravastatin (PRAVACHOL) 20 mg tablet, Take 1 tablet (20 mg total) by mouth daily, Disp: 90 tablet, Rfl: 1    No Known Allergies    Objective: There were no vitals filed for this visit  There is no height or weight on file to calculate BMI      Ortho Exam    Physical Exam

## 2022-07-05 ENCOUNTER — TELEPHONE (OUTPATIENT)
Dept: FAMILY MEDICINE CLINIC | Facility: CLINIC | Age: 62
End: 2022-07-05

## 2022-07-14 ENCOUNTER — APPOINTMENT (OUTPATIENT)
Dept: RADIOLOGY | Facility: CLINIC | Age: 62
End: 2022-07-14
Payer: COMMERCIAL

## 2022-07-14 ENCOUNTER — OFFICE VISIT (OUTPATIENT)
Dept: OBGYN CLINIC | Facility: CLINIC | Age: 62
End: 2022-07-14
Payer: COMMERCIAL

## 2022-07-14 VITALS
HEART RATE: 71 BPM | DIASTOLIC BLOOD PRESSURE: 77 MMHG | HEIGHT: 73 IN | WEIGHT: 225.6 LBS | BODY MASS INDEX: 29.9 KG/M2 | SYSTOLIC BLOOD PRESSURE: 124 MMHG

## 2022-07-14 DIAGNOSIS — Z01.89 ENCOUNTER FOR OTHER SPECIFIED SPECIAL EXAMINATIONS: ICD-10-CM

## 2022-07-14 DIAGNOSIS — M17.12 PRIMARY OSTEOARTHRITIS OF LEFT KNEE: ICD-10-CM

## 2022-07-14 DIAGNOSIS — M23.92 INTERNAL DERANGEMENT OF LEFT KNEE: Primary | ICD-10-CM

## 2022-07-14 DIAGNOSIS — G89.29 CHRONIC PAIN OF LEFT KNEE: ICD-10-CM

## 2022-07-14 DIAGNOSIS — M25.562 CHRONIC PAIN OF LEFT KNEE: ICD-10-CM

## 2022-07-14 PROCEDURE — 73562 X-RAY EXAM OF KNEE 3: CPT

## 2022-07-14 PROCEDURE — 99213 OFFICE O/P EST LOW 20 MIN: CPT | Performed by: ORTHOPAEDIC SURGERY

## 2022-07-14 PROCEDURE — 73560 X-RAY EXAM OF KNEE 1 OR 2: CPT

## 2022-07-14 NOTE — PROGRESS NOTES
Assessment/Plan:  1  Internal derangement of left knee  MRI knee left  wo contrast   2  Primary osteoarthritis of left knee  XR knee 3 vw left non injury   3  Chronic pain of left knee       Scribe Attestation    I,:  Taylor Segovia MA am acting as a scribe while in the presence of the attending physician :       I,:  Saurav Ruiz DO personally performed the services described in this documentation    as scribed in my presence :           60-year-old male who presents to the office today for follow up evaluation left knee pain  X-rays were reviewed in the office today which demonstrate mild degenerative changes  Patient has tried and failed non operative treatment in the form of activity modification, OTC medications, steroid injections, and visco supplementation all of which have not provided him with relief  I did discuss based off of mild findings on x-rays I would expect him to see relief from non operative treatment options  We did discuss ordering a MRI of his left knee to evaluate for a insuffiencey fracture versus a focal chondral lesion  The patient was agreeable to this and a order was placed for this today  I will call him with the results of the MRI to better delineate plan of care  Subjective: left knee pain     Patient ID: Mis Farfan is a 57 y o  male  HPI  Zofia Smith is a 60-year-old male who presents to the office today for follow up evaluation of his left knee pain secondary to his underlying osteoarthritis  Patient underwent Orthovisc 3 series on 2/25/22 which he states only provided him with a couple weeks worth of relief  Patient notes pain to the medial of his knee  He notes increased pain with sitting and getting up from a seated position  He has tried OTC medications without relief  Review of Systems   Constitutional: Positive for activity change  Negative for chills and fever  HENT: Negative for drooling and sneezing  Eyes: Negative for redness     Respiratory: Negative for cough and wheezing  Gastrointestinal: Negative for nausea and vomiting  Musculoskeletal: Positive for arthralgias  Negative for joint swelling and myalgias  Neurological: Negative for weakness and numbness  Psychiatric/Behavioral: Negative for behavioral problems  The patient is not nervous/anxious  Past Medical History:   Diagnosis Date    Benign essential hypertension     LAST ASSESSED 5/1/17; RESOLVED 5/1/17    Chronic pain disorder     Controlled insulin dependent diabetes mellitus     LAST ASSESSED 4/1/15; RESOLVED 5/2/16    Diabetes mellitus ([facility] [address] ) Long time ago    Hyperlipidemia     Low back pain     Neck pain     Osteoarthritis     Pericarditis     Type 2 diabetes mellitus ([facility] )        Past Surgical History:   Procedure Laterality Date    KNEE SURGERY      NERVE BLOCK Bilateral 7/19/2019    Procedure: C3 C4 C5 C6 Medial Branch Block #1 ([id number]); Surgeon: Anthony Rodriguez MD;  Location: [facility] MAIN OR;  Service: Pain Management     NERVE BLOCK Bilateral 8/2/2019    Procedure: C3 C4 C5 C6 Medial Branch Block #2 ([id number]);   Surgeon: Anthony Rodriguez MD;  Location: [facility] MAIN OR;  Service: Pain Management     RADIOFREQUENCY ABLATION Right 8/16/2019    Procedure: C3 C4 C5 C6 RADIOFREQUENCY ABLATION;  Surgeon: Anthony Rodriguez MD;  Location: [place] [address] MAIN OR;  Service: Pain Management     RADIOFREQUENCY ABLATION Left 8/30/2019    Procedure: C3 C4 C5 C6 RADIOFREQUENCY ABLATION;  Surgeon: Anthony Rodriguez MD;  Location: [place] [address] MAIN OR;  Service: Pain Management     REPLACEMENT TOTAL KNEE Right     WRIST SURGERY         Family History   Problem Relation Age of Onset    Cancer Family     Cancer Mother     Cancer Father     No Known Problems Sister     Cancer Brother     No Known Problems Maternal Aunt     No Known Problems Maternal Uncle     No Known Problems Paternal Aunt     No Known Problems Paternal Uncle     No Known Problems Maternal Grandmother     No Known Problems Maternal Grandfather     No Known Problems Paternal Grandmother     No Known Problems Paternal Grandfather     Diabetes Brother        Social History     Occupational History    Not on file   Tobacco Use    Smoking status: Never Smoker    Smokeless tobacco: Never Used   Vaping Use    Vaping Use: Never used   Substance and Sexual Activity    Alcohol use: No    Drug use: Never    Sexual activity: Not Currently     Partners: Female         Current Outpatient Medications:     glimepiride (AMARYL) 4 mg tablet, Take 1 tablet (4 mg total) by mouth daily with breakfast, Disp: 90 tablet, Rfl: 1    glucose blood (ONETOUCH VERIO) test strip, Use as instructed 1x/d [E11 20], Disp: 100 each, Rfl: 1    lisinopril (ZESTRIL) 10 mg tablet, Take 1 tablet (10 mg total) by mouth daily, Disp: 90 tablet, Rfl: 1    metFORMIN (GLUCOPHAGE-XR) 500 mg 24 hr tablet, Take 4 tablets (2,000 mg total) by mouth daily with breakfast, Disp: 360 tablet, Rfl: 1    ONE TOUCH LANCETS MISC, by Does not apply route daily Use as instructed 1x/d [E11 20], Disp: 100 each, Rfl: 3    pravastatin (PRAVACHOL) 20 mg tablet, Take 1 tablet (20 mg total) by mouth daily, Disp: 90 tablet, Rfl: 1    No Known Allergies    Objective:  Vitals:    07/14/22 1123   BP: 124/77   Pulse: 71       Body mass index is 29 76 kg/m²  Left Knee Exam     Muscle Strength   The patient has normal left knee strength  Tenderness   The patient is experiencing tenderness in the medial joint line, lateral joint line and MCL      Range of Motion   Extension:  0 normal   Flexion:  120 normal     Tests   Varus: negative Valgus: negative  Drawer:  Anterior - negative       Patellar apprehension: negative    Other   Erythema: absent  Scars: absent  Sensation: normal  Pulse: present  Swelling: none  Effusion: no effusion present    Comments:  Stable knee on ligamentous exam at 0, 30, 90 degrees  Peripatellar Crepitus with positive patellofemoral grind   thigh calf soft nontender   ambulates with a slightly antalgic gait            Observations   Left Knee   Negative for effusion  Physical Exam  Vitals and nursing note reviewed  Constitutional:       Appearance: He is well-developed  HENT:      Head: Normocephalic and atraumatic  Eyes:      General: No scleral icterus  Right eye: No discharge  Left eye: No discharge  Extraocular Movements: Extraocular movements intact  Conjunctiva/sclera: Conjunctivae normal    Cardiovascular:      Rate and Rhythm: Normal rate  Pulmonary:      Effort: Pulmonary effort is normal  No respiratory distress  Musculoskeletal:      Cervical back: Normal range of motion and neck supple  Left knee: No effusion  Comments: As noted in HPI   Skin:     General: Skin is warm and dry  Neurological:      Mental Status: He is alert and oriented to person, place, and time  Psychiatric:         Behavior: Behavior normal          Thought Content: Thought content normal          Judgment: Judgment normal          I have personally reviewed pertinent films in PACS X-rays left knee performed in the office today demonstrate mild degenerative changes  No OCD lesion  No evidence of AVN  No fractures or dislocations

## 2022-07-27 ENCOUNTER — TELEPHONE (OUTPATIENT)
Dept: OBGYN CLINIC | Facility: CLINIC | Age: 62
End: 2022-07-27

## 2022-07-27 NOTE — TELEPHONE ENCOUNTER
----- Message from Guillermo Solitario PA-C sent at 7/27/2022  4:16 PM EDT -----  Patient's MRI does demonstrate complex meniscus tear  Please have him follow up with Dr Duglas Willett to discuss potential surgical options since he has already failed all forms of conservative treatment  He may follow up with Dr Chantale Pyle as needed  Please ensure that he brings a copy of his CD for Dr Duglas Willett to review   Thanks

## 2022-07-28 NOTE — TELEPHONE ENCOUNTER
Patient was called and advised   He changed his appt on 8/9/22 to his knee instead of wrist  He will make a separate appointment for his wrist

## 2022-08-09 ENCOUNTER — HOSPITAL ENCOUNTER (OUTPATIENT)
Facility: HOSPITAL | Age: 62
Setting detail: OUTPATIENT SURGERY
End: 2022-08-09
Attending: ORTHOPAEDIC SURGERY | Admitting: ORTHOPAEDIC SURGERY
Payer: COMMERCIAL

## 2022-08-09 ENCOUNTER — OFFICE VISIT (OUTPATIENT)
Dept: OBGYN CLINIC | Facility: CLINIC | Age: 62
End: 2022-08-09
Payer: COMMERCIAL

## 2022-08-09 VITALS
WEIGHT: 215 LBS | HEART RATE: 68 BPM | BODY MASS INDEX: 28.49 KG/M2 | SYSTOLIC BLOOD PRESSURE: 132 MMHG | DIASTOLIC BLOOD PRESSURE: 81 MMHG | HEIGHT: 73 IN

## 2022-08-09 DIAGNOSIS — S83.232A COMPLEX TEAR OF MEDIAL MENISCUS OF LEFT KNEE AS CURRENT INJURY, INITIAL ENCOUNTER: Primary | ICD-10-CM

## 2022-08-09 DIAGNOSIS — S83.282A TEAR OF LATERAL MENISCUS OF LEFT KNEE, CURRENT, UNSPECIFIED TEAR TYPE, INITIAL ENCOUNTER: ICD-10-CM

## 2022-08-09 PROCEDURE — 99214 OFFICE O/P EST MOD 30 MIN: CPT | Performed by: ORTHOPAEDIC SURGERY

## 2022-08-09 NOTE — PROGRESS NOTES
Assessment/Plan:  1  Complex tear of medial meniscus of left knee as current injury, initial encounter  Ambulatory Referral to Physical Therapy   2  Tear of lateral meniscus of left knee, current, unspecified tear type, initial encounter  Ambulatory Referral to Physical Therapy       Scribe Attestation    I,:  Luis M Reyes am acting as a scribe while in the presence of the attending physician :       I,:  Gisselle Reddy MD personally performed the services described in this documentation    as scribed in my presence :           Manav Alexandra upon examination review the MR for his left knee does demonstrate a medial lateral meniscus tear  His exam is consistent with these findings with point tenderness the medial lateral joint line as well as a positive Rivera sign  Unfortunately, he has failed non operative measures treatment in the form of activity modification, viscosupplementation, over-the-counter oral analgesics and steroid injections  As he has failed non operative measures treatment up to this point in addition to the findings of the MRI and his clinical exam I did remark that he could consider a left knee arthroscopy with medial lateral meniscectomy  I did discuss the procedure with him at length as well as the risks including but not limited to bleeding, infection, blood clot, stiffness, worsening symptoms, failure surgery, recurrent injury, nerve injury resulting weakness, numbness, pain and need for further surgery  I did explain the will utilize Tylenol and Motrin postoperatively for pain management and will utilize aspirin as DVT prophylaxis  He was instructed to bring crutches with him on date of surgery  Subjective:   Mateo Grey is a 57 y o  male who presents to the office today for evaluation of his left knee  He is referred to me today by Dr Zeenat Carey    This has been a long-term issue and has been treated non operatively with steroid injections, over-the-counter oral analgesics, viscosupplementation and activity modifications  Unfortunately these have failed to provide him with prolonged symptomatic relief  He does localize the pain to the anterior medial aspect of his knee during weight-bearing activities as well as deep knee flexion  He does experience a click that can be painful at times as well  He denies any gross instability that has resulted in mechanical fall  He notes that his knee does have recurrent swelling that does further exacerbate his painful symptoms  Today he denies any distal paresthesias  He did have an MRI of the left knee completed that did demonstrate medial lateral meniscus tear  He is here to discuss surgical intervention the form of a left knee arthroscopy with meniscectomy  Review of Systems   Constitutional: Negative for chills, fever and unexpected weight change  HENT: Negative for hearing loss, nosebleeds and sore throat  Eyes: Negative for pain, redness and visual disturbance  Respiratory: Negative for cough, shortness of breath and wheezing  Cardiovascular: Negative for chest pain, palpitations and leg swelling  Gastrointestinal: Negative for abdominal pain, nausea and vomiting  Endocrine: Negative for polyphagia and polyuria  Genitourinary: Negative for dysuria and hematuria  Musculoskeletal: Positive for arthralgias and myalgias  See HPI   Skin: Negative for rash and wound  Neurological: Negative for dizziness, numbness and headaches  Psychiatric/Behavioral: Negative for decreased concentration and suicidal ideas  The patient is not nervous/anxious            Past Medical History:   Diagnosis Date    Benign essential hypertension     LAST ASSESSED 5/1/17; RESOLVED 5/1/17    Chronic pain disorder     Controlled insulin dependent diabetes mellitus     LAST ASSESSED 4/1/15; RESOLVED 5/2/16    Diabetes mellitus ([address] ) Long time ago    Hyperlipidemia     Low back pain     Neck pain     Osteoarthritis     Pericarditis     Type 2 diabetes mellitus ([facility] [address]        Past Surgical History:   Procedure Laterality Date    KNEE SURGERY      NERVE BLOCK Bilateral 7/19/2019    Procedure: C3 C4 C5 C6 Medial Branch Block #1 ([id number]); Surgeon: Melo Sanchez MD;  Location: [facility] MAIN OR;  Service: Pain Management     NERVE BLOCK Bilateral 8/2/2019    Procedure: C3 C4 C5 C6 Medial Branch Block #2 ([id number]);   Surgeon: Melo Sanchez MD;  Location: [facility] MAIN OR;  Service: Pain Management     RADIOFREQUENCY ABLATION Right 8/16/2019    Procedure: C3 C4 C5 C6 RADIOFREQUENCY ABLATION;  Surgeon: Melo Sanchez MD;  Location: [place] [address] MAIN OR;  Service: Pain Management     RADIOFREQUENCY ABLATION Left 8/30/2019    Procedure: C3 C4 C5 C6 RADIOFREQUENCY ABLATION;  Surgeon: Melo Sanchez MD;  Location: [place] [address] MAIN OR;  Service: Pain Management     REPLACEMENT TOTAL KNEE Right     WRIST SURGERY         Family History   Problem Relation Age of Onset    Cancer Family     Cancer Mother     Cancer Father     No Known Problems Sister     Cancer Brother     No Known Problems Maternal Aunt     No Known Problems Maternal Uncle     No Known Problems Paternal Aunt     No Known Problems Paternal Uncle     No Known Problems Maternal Grandmother     No Known Problems Maternal Grandfather     No Known Problems Paternal Grandmother     No Known Problems Paternal Grandfather     Diabetes Brother        Social History     Occupational History    Not on file   Tobacco Use    Smoking status: Never Smoker    Smokeless tobacco: Never Used   Vaping Use    Vaping Use: Never used   Substance and Sexual Activity    Alcohol use: No    Drug use: Never    Sexual activity: Not Currently     Partners: Female         Current Outpatient Medications:     glimepiride (AMARYL) 4 mg tablet, Take 1 tablet (4 mg total) by mouth daily with breakfast, Disp: 90 tablet, Rfl: 1    glucose blood (ONETOUCH VERIO) test strip, Use as instructed 1x/d [E11 20], Disp: 100 each, Rfl: 1    lisinopril (ZESTRIL) 10 mg tablet, Take 1 tablet (10 mg total) by mouth daily, Disp: 90 tablet, Rfl: 1    metFORMIN (GLUCOPHAGE-XR) 500 mg 24 hr tablet, Take 4 tablets (2,000 mg total) by mouth daily with breakfast, Disp: 360 tablet, Rfl: 1    ONE TOUCH LANCETS MISC, by Does not apply route daily Use as instructed 1x/d [E11 20], Disp: 100 each, Rfl: 3    pravastatin (PRAVACHOL) 20 mg tablet, Take 1 tablet (20 mg total) by mouth daily, Disp: 90 tablet, Rfl: 1    No Known Allergies    Objective:  Vitals:    08/09/22 1114   BP: 132/81   Pulse: 68       Left Knee Exam     Tenderness   The patient is experiencing tenderness in the medial joint line  Range of Motion   Extension: 0   Flexion: 120 (Pain medially)     Tests   Varus: negative Valgus: negative  Drawer:  Anterior - negative     Posterior - negative    Other   Erythema: absent  Scars: absent  Sensation: normal  Pulse: present  Effusion: effusion (1+) present          Observations   Left Knee   Positive for effusion (1+)  Physical Exam  Vitals reviewed  HENT:      Head: Normocephalic and atraumatic  Eyes:      General:         Right eye: No discharge  Left eye: No discharge  Conjunctiva/sclera: Conjunctivae normal       Pupils: Pupils are equal, round, and reactive to light  Cardiovascular:      Rate and Rhythm: Normal rate  Pulmonary:      Effort: Pulmonary effort is normal  No respiratory distress  Musculoskeletal:      Cervical back: Normal range of motion and neck supple  Left knee: Effusion (1+) present  Comments: As noted in HPI   Skin:     General: Skin is warm and dry  Neurological:      Mental Status: He is alert and oriented to person, place, and time     Psychiatric:         Mood and Affect: Mood normal          Behavior: Behavior normal          I have personally reviewed pertinent films in PACS and my interpretation is as follows:    MRI report of the left knee demonstrate a medial meniscus tear, and evidence of a lateral meniscust anterior horn tear

## 2022-08-10 ENCOUNTER — TELEPHONE (OUTPATIENT)
Dept: OBGYN CLINIC | Facility: HOSPITAL | Age: 62
End: 2022-08-10

## 2022-08-10 NOTE — TELEPHONE ENCOUNTER
Spoke with Staci White  We did discuss the only Friday open date is Aug 19th  He is going to check with his boss this evening, and then call me back tomorrow to let me know if it's a go for 8/19

## 2022-08-11 ENCOUNTER — APPOINTMENT (OUTPATIENT)
Dept: LAB | Facility: HOSPITAL | Age: 62
End: 2022-08-11
Payer: COMMERCIAL

## 2022-08-11 DIAGNOSIS — Z01.812 ENCOUNTER FOR PRE-OPERATIVE LABORATORY TESTING: ICD-10-CM

## 2022-08-11 DIAGNOSIS — S83.232A COMPLEX TEAR OF MEDIAL MENISCUS OF LEFT KNEE AS CURRENT INJURY, INITIAL ENCOUNTER: ICD-10-CM

## 2022-08-11 LAB
ANION GAP SERPL CALCULATED.3IONS-SCNC: 8 MMOL/L (ref 4–13)
APTT PPP: 25 SECONDS (ref 23–37)
BASOPHILS # BLD AUTO: 0.03 THOUSANDS/ΜL (ref 0–0.1)
BASOPHILS NFR BLD AUTO: 1 % (ref 0–1)
BUN SERPL-MCNC: 11 MG/DL (ref 5–25)
CALCIUM SERPL-MCNC: 8.9 MG/DL (ref 8.3–10.1)
CHLORIDE SERPL-SCNC: 105 MMOL/L (ref 96–108)
CO2 SERPL-SCNC: 26 MMOL/L (ref 21–32)
CREAT SERPL-MCNC: 0.92 MG/DL (ref 0.6–1.3)
EOSINOPHIL # BLD AUTO: 0.06 THOUSAND/ΜL (ref 0–0.61)
EOSINOPHIL NFR BLD AUTO: 1 % (ref 0–6)
ERYTHROCYTE [DISTWIDTH] IN BLOOD BY AUTOMATED COUNT: 12.2 % (ref 11.6–15.1)
EST. AVERAGE GLUCOSE BLD GHB EST-MCNC: 269 MG/DL
GFR SERPL CREATININE-BSD FRML MDRD: 88 ML/MIN/1.73SQ M
GLUCOSE P FAST SERPL-MCNC: 203 MG/DL (ref 65–99)
HBA1C MFR BLD: 11 %
HCT VFR BLD AUTO: 46 % (ref 36.5–49.3)
HGB BLD-MCNC: 15.3 G/DL (ref 12–17)
IMM GRANULOCYTES # BLD AUTO: 0.01 THOUSAND/UL (ref 0–0.2)
IMM GRANULOCYTES NFR BLD AUTO: 0 % (ref 0–2)
INR PPP: 1.01 (ref 0.84–1.19)
LYMPHOCYTES # BLD AUTO: 1.41 THOUSANDS/ΜL (ref 0.6–4.47)
LYMPHOCYTES NFR BLD AUTO: 26 % (ref 14–44)
MCH RBC QN AUTO: 30.4 PG (ref 26.8–34.3)
MCHC RBC AUTO-ENTMCNC: 33.3 G/DL (ref 31.4–37.4)
MCV RBC AUTO: 92 FL (ref 82–98)
MONOCYTES # BLD AUTO: 0.45 THOUSAND/ΜL (ref 0.17–1.22)
MONOCYTES NFR BLD AUTO: 8 % (ref 4–12)
NEUTROPHILS # BLD AUTO: 3.47 THOUSANDS/ΜL (ref 1.85–7.62)
NEUTS SEG NFR BLD AUTO: 64 % (ref 43–75)
NRBC BLD AUTO-RTO: 0 /100 WBCS
PLATELET # BLD AUTO: 197 THOUSANDS/UL (ref 149–390)
PMV BLD AUTO: 10.7 FL (ref 8.9–12.7)
POTASSIUM SERPL-SCNC: 3.8 MMOL/L (ref 3.5–5.3)
PROTHROMBIN TIME: 13.4 SECONDS (ref 11.6–14.5)
RBC # BLD AUTO: 5.03 MILLION/UL (ref 3.88–5.62)
SODIUM SERPL-SCNC: 139 MMOL/L (ref 135–147)
WBC # BLD AUTO: 5.43 THOUSAND/UL (ref 4.31–10.16)

## 2022-08-11 PROCEDURE — 85025 COMPLETE CBC W/AUTO DIFF WBC: CPT

## 2022-08-11 PROCEDURE — 83036 HEMOGLOBIN GLYCOSYLATED A1C: CPT

## 2022-08-11 PROCEDURE — 3046F HEMOGLOBIN A1C LEVEL >9.0%: CPT | Performed by: INTERNAL MEDICINE

## 2022-08-11 PROCEDURE — 85730 THROMBOPLASTIN TIME PARTIAL: CPT

## 2022-08-11 PROCEDURE — 85610 PROTHROMBIN TIME: CPT

## 2022-08-11 PROCEDURE — 36415 COLL VENOUS BLD VENIPUNCTURE: CPT

## 2022-08-11 PROCEDURE — 80048 BASIC METABOLIC PNL TOTAL CA: CPT

## 2022-08-11 NOTE — TELEPHONE ENCOUNTER
Glo Li called back this morning  He is good to have the surgery on 8/19  Post op appointment has been moved up  [facility] appointment has also been moved up  New surgery date has been updated

## 2022-08-12 ENCOUNTER — TELEPHONE (OUTPATIENT)
Dept: FAMILY MEDICINE CLINIC | Facility: CLINIC | Age: 62
End: 2022-08-12

## 2022-08-12 NOTE — TELEPHONE ENCOUNTER
Spoke with patient  Discussed that once his A1C becomes <8 we will then discuss moving forward with the surgery  He verbalized understanding

## 2022-08-24 ENCOUNTER — OFFICE VISIT (OUTPATIENT)
Dept: FAMILY MEDICINE CLINIC | Facility: CLINIC | Age: 62
End: 2022-08-24
Payer: COMMERCIAL

## 2022-08-24 VITALS
RESPIRATION RATE: 18 BRPM | HEART RATE: 83 BPM | WEIGHT: 221 LBS | OXYGEN SATURATION: 97 % | SYSTOLIC BLOOD PRESSURE: 140 MMHG | DIASTOLIC BLOOD PRESSURE: 80 MMHG | TEMPERATURE: 97.4 F | BODY MASS INDEX: 29.93 KG/M2 | HEIGHT: 72 IN

## 2022-08-24 DIAGNOSIS — Z12.5 PROSTATE CANCER SCREENING: ICD-10-CM

## 2022-08-24 DIAGNOSIS — N18.2 TYPE 2 DIABETES MELLITUS WITH STAGE 2 CHRONIC KIDNEY DISEASE, WITHOUT LONG-TERM CURRENT USE OF INSULIN (HCC): ICD-10-CM

## 2022-08-24 DIAGNOSIS — E11.22 TYPE 2 DIABETES MELLITUS WITH STAGE 2 CHRONIC KIDNEY DISEASE, WITHOUT LONG-TERM CURRENT USE OF INSULIN (HCC): ICD-10-CM

## 2022-08-24 DIAGNOSIS — E78.5 HYPERLIPIDEMIA LDL GOAL <100: ICD-10-CM

## 2022-08-24 DIAGNOSIS — Z00.00 WELL ADULT EXAM: Primary | ICD-10-CM

## 2022-08-24 PROCEDURE — 99396 PREV VISIT EST AGE 40-64: CPT | Performed by: INTERNAL MEDICINE

## 2022-08-24 PROCEDURE — 3066F NEPHROPATHY DOC TX: CPT | Performed by: INTERNAL MEDICINE

## 2022-08-24 PROCEDURE — 92250 FUNDUS PHOTOGRAPHY W/I&R: CPT | Performed by: INTERNAL MEDICINE

## 2022-08-24 PROCEDURE — 3725F SCREEN DEPRESSION PERFORMED: CPT | Performed by: INTERNAL MEDICINE

## 2022-08-24 RX ORDER — METFORMIN HYDROCHLORIDE 500 MG/1
2000 TABLET, EXTENDED RELEASE ORAL
Qty: 360 TABLET | Refills: 3 | Status: SHIPPED | OUTPATIENT
Start: 2022-08-24

## 2022-08-24 RX ORDER — ROSUVASTATIN CALCIUM 5 MG/1
5 TABLET, COATED ORAL DAILY
Qty: 90 TABLET | Refills: 3 | Status: SHIPPED | OUTPATIENT
Start: 2022-08-24

## 2022-08-24 NOTE — ASSESSMENT & PLAN NOTE
Lab Results   Component Value Date    HGBA1C 11 0 (H) 08/11/2022     Not well controlled  Discussed how it was likely the glimepiride that was causing hypoglycemia  He is agreeable to restarting the metformin and will do this in a taper so as to not get diarrhea initially  Discussed need for limiting carbs and sweets, also referred to DM education  He is in need of likely knee surgery and we discussed A1C recommendations prior to clearance as well  We also discussed rationale for ACE-I and statin with DM  He is agreeable to restarting a statin at this time and will check urine for microalbumunuria but prefers to hold off on ACE at this time

## 2022-08-24 NOTE — PROGRESS NOTES
FAMILY PRACTICE HEALTH MAINTENANCE OFFICE VISIT  [facility]    NAME: Doris Baldwin Sr   AGE: 57 y o  SEX: male  : 1960     DATE: 2022    Assessment and Plan     1  Well adult exam    2  Type 2 diabetes mellitus with stage 2 chronic kidney disease, without long-term current use of insulin (HCC)  Assessment & Plan:    Lab Results   Component Value Date    HGBA1C 11 0 (H) 2022     Not well controlled  Discussed how it was likely the glimepiride that was causing hypoglycemia  He is agreeable to restarting the metformin and will do this in a taper so as to not get diarrhea initially  Discussed need for limiting carbs and sweets, also referred to DM education  He is in need of likely knee surgery and we discussed A1C recommendations prior to clearance as well  We also discussed rationale for ACE-I and statin with DM  He is agreeable to restarting a statin at this time and will check urine for microalbumunuria but prefers to hold off on ACE at this time  Orders:  -     CBC and differential; Future; Expected date: 2022  -     Comprehensive metabolic panel; Future; Expected date: 2022  -     Lipid panel; Future; Expected date: 2022  -     HEMOGLOBIN A1C W/ EAG ESTIMATION; Future; Expected date: 2022  -     Microalbumin / creatinine urine ratio; Future; Expected date: 2022  -     metFORMIN (GLUCOPHAGE-XR) 500 mg 24 hr tablet; Take 4 tablets (2,000 mg total) by mouth daily with breakfast  -     IRIS Diabetic eye exam  -     Ambulatory referral to Diabetic Education; Future; Expected date: 2022    3  Prostate cancer screening  -     rosuvastatin (CRESTOR) 5 mg tablet; Take 1 tablet (5 mg total) by mouth daily  -     PSA, Total Screen; Future; Expected date: 2022    4   Hyperlipidemia LDL goal <100        Patient Counseling:   Nutrition: Stressed importance of a well balanced diet, moderation of sodium/saturated fat, caloric balance and sufficient intake of fiber  Exercise: Stressed the importance of regular exercise with a goal of 150 minutes per week  Dental Health: Discussed daily flossing and brushing and regular dental visits     Immunizations reviewed: Up To Date  Discussed benefits of:  Colon Cancer Screening, Prostate Cancer Screening  and Screening labs  BMI Counseling: Body mass index is 29 97 kg/m²  Discussed with patient's BMI with him  The BMI is above normal  Nutrition recommendations include reducing portion sizes and decreasing overall calorie intake  Exercise recommendations include moderate aerobic physical activity for 150 minutes/week  Return in about 3 months (around 11/24/2022)  Chief Complaint     Chief Complaint   Patient presents with    Physical Exam     rmklpn       History of Present Illness     Here for CPE, but also to discuss DM  He stopped all his medication  He states he feels fine without them and had some low glucose readings with the metformin and glimepiride, did not like this  He eats a lot of carbs and pastries  Here with his wife, who is explaining his diet  He is reluctant to change but we did discuss the need to keep sweets as a treat and limit carbs  He has been to diabetic ed in the past but is willing to go again  We discussed the need for good glucose control as a way to diminish chances of developing retinopathy, nephropathy, CAD, PVD, neuropathy, to name a few          Well Adult Physical   Patient here for a comprehensive physical exam       Diet and Physical Activity  Diet: poor diet  Exercise: frequently      Depression Screen  PHQ-2/9 Depression Screening    Little interest or pleasure in doing things: 0 - not at all  Feeling down, depressed, or hopeless: 0 - not at all  PHQ-2 Score: 0  PHQ-2 Interpretation: Negative depression screen          General Health  Hearing: Normal:  bilateral  Vision: no vision problems  Dental: regular dental visits    Reproductive Health  No issues  and Denies nocturia      The following portions of the patient's history were reviewed and updated as appropriate: allergies, current medications, past family history, past medical history, past social history, past surgical history and problem list     Review of Systems     Review of Systems   Constitutional: Negative  Negative for chills and fever  HENT: Negative  Negative for ear pain and sore throat  Eyes: Negative  Negative for pain and visual disturbance  Respiratory: Negative  Negative for cough and shortness of breath  Cardiovascular: Negative  Negative for chest pain and palpitations  Gastrointestinal: Negative  Negative for abdominal pain and vomiting  Endocrine: Negative  Genitourinary: Negative  Negative for dysuria and hematuria  Musculoskeletal: Negative  Negative for arthralgias and back pain  Skin: Negative  Negative for color change and rash  Allergic/Immunologic: Negative  Neurological: Negative  Negative for seizures and syncope  Hematological: Negative  Psychiatric/Behavioral: Negative  All other systems reviewed and are negative  Past Medical History     Past Medical History:   Diagnosis Date    Benign essential hypertension     LAST ASSESSED 5/1/17; RESOLVED 5/1/17    Chronic pain disorder     Controlled insulin dependent diabetes mellitus     LAST ASSESSED 4/1/15; RESOLVED 5/2/16    Diabetes mellitus ([facility] [address] ) Long time ago    Hyperlipidemia     Low back pain     Neck pain     Osteoarthritis     Pericarditis     Type 2 diabetes mellitus ([facility] [address] )        Past Surgical History     Past Surgical History:   Procedure Laterality Date    KNEE SURGERY      NERVE BLOCK Bilateral 7/19/2019    Procedure: C3 C4 C5 C6 Medial Branch Block #1 ([id number]);   Surgeon: Lali Eli MD;  Location: [facility] MAIN OR;  Service: Pain Management     NERVE BLOCK Bilateral 8/2/2019    Procedure: C3 C4 C5 C6 Medial Branch Block #2 ([id number] [facility]);   Surgeon: Anthony Rodriguez MD;  Location: [facility] MAIN OR;  Service: Pain Management     RADIOFREQUENCY ABLATION Right 8/16/2019    Procedure: C3 C4 C5 C6 RADIOFREQUENCY ABLATION;  Surgeon: Anthony Rodriguez MD;  Location: [place] [address] MAIN OR;  Service: Pain Management     RADIOFREQUENCY ABLATION Left 8/30/2019    Procedure: C3 C4 C5 C6 RADIOFREQUENCY ABLATION;  Surgeon: Anthony Rodriguez MD;  Location: [place] [address] MAIN OR;  Service: Pain Management     REPLACEMENT TOTAL KNEE Right     WRIST SURGERY         Social History     Social History     Socioeconomic History    Marital status: /Civil Union     Spouse name: None    Number of children: None    Years of education: None    Highest education level: None   Occupational History    None   Tobacco Use    Smoking status: Never Smoker    Smokeless tobacco: Never Used   Vaping Use    Vaping Use: Never used   Substance and Sexual Activity    Alcohol use: No    Drug use: Never    Sexual activity: Not Currently     Partners: Female   Other Topics Concern    None   Social History Narrative    [employer]      Social Determinants of Health     Financial Resource Strain: Not on file   Food Insecurity: Not on file   Transportation Needs: Not on file   Physical Activity: Not on file   Stress: Not on file   Social Connections: Not on file   Intimate Partner Violence: Not on file   Housing Stability: Not on file       Family History     Family History   Problem Relation Age of Onset    Cancer Family     Cancer Mother     Cancer Father     No Known Problems Sister     Cancer Brother     No Known Problems Maternal Aunt     No Known Problems Maternal Uncle     No Known Problems Paternal Aunt     No Known Problems Paternal Uncle     No Known Problems Maternal Grandmother     No Known Problems Maternal Grandfather     No Known Problems Paternal Grandmother     No Known Problems Paternal Grandfather     Diabetes Brother        Current Medications       Current Outpatient Medications:     metFORMIN (GLUCOPHAGE-XR) 500 mg 24 hr tablet, Take 4 tablets (2,000 mg total) by mouth daily with breakfast, Disp: 360 tablet, Rfl: 3    rosuvastatin (CRESTOR) 5 mg tablet, Take 1 tablet (5 mg total) by mouth daily, Disp: 90 tablet, Rfl: 3     Allergies     No Known Allergies    Objective     /80   Pulse 83   Temp (!) 97 4 °F (36 3 °C)   Resp 18   Ht 6' (1 829 m)   Wt 100 kg (221 lb)   SpO2 97%   BMI 29 97 kg/m²      Physical Exam  Constitutional:       General: He is not in acute distress  Appearance: He is well-developed  HENT:      Head: Normocephalic and atraumatic  Right Ear: External ear normal       Left Ear: External ear normal       Nose: Nose normal    Eyes:      Conjunctiva/sclera: Conjunctivae normal       Pupils: Pupils are equal, round, and reactive to light  Neck:      Thyroid: No thyromegaly  Cardiovascular:      Rate and Rhythm: Normal rate and regular rhythm  Heart sounds: No murmur heard  No friction rub  No gallop  Pulmonary:      Effort: Pulmonary effort is normal       Breath sounds: Normal breath sounds  No wheezing or rales  Abdominal:      General: Bowel sounds are normal  There is no distension  Palpations: Abdomen is soft  Tenderness: There is no abdominal tenderness  Musculoskeletal:         General: No tenderness or deformity  Normal range of motion  Cervical back: Normal range of motion and neck supple  Lymphadenopathy:      Cervical: No cervical adenopathy  Skin:     General: Skin is dry  Findings: No rash  Neurological:      Mental Status: He is alert and oriented to person, place, and time  Cranial Nerves: No cranial nerve deficit  Motor: No abnormal muscle tone  Coordination: Coordination normal       Deep Tendon Reflexes: Reflexes are normal and symmetric   Reflexes normal    Psychiatric:         Behavior: Behavior normal          Thought Content:  Thought content normal          Judgment: Judgment normal             Visual Acuity Screening    Right eye Left eye Both eyes   Without correction:      With correction: 20/40 20/40 20/25           MD MIREYA Palmer [facility]

## 2022-08-24 NOTE — PROGRESS NOTES
Assessment/Plan:    1  Well adult exam    2  Type 2 diabetes mellitus with stage 2 chronic kidney disease, without long-term current use of insulin ([facility] [address] )  -     Ambulatory Referral to Diabetic Education; Future  -     CBC and differential; Future; Expected date: 11/24/2022  -     Comprehensive metabolic panel; Future; Expected date: 11/24/2022  -     Lipid panel; Future; Expected date: 11/24/2022  -     HEMOGLOBIN A1C W/ EAG ESTIMATION; Future; Expected date: 11/24/2022  -     Microalbumin / creatinine urine ratio; Future; Expected date: 11/24/2022  -     metFORMIN (GLUCOPHAGE-XR) 500 mg 24 hr tablet; Take 4 tablets (2,000 mg total) by mouth daily with breakfast  -     IRIS Diabetic eye exam    3  Prostate cancer screening  -     rosuvastatin (CRESTOR) 5 mg tablet; Take 1 tablet (5 mg total) by mouth daily  -     PSA, Total Screen; Future; Expected date: 11/24/2022    4  Hyperlipidemia LDL goal <100          There are no Patient Instructions on file for this visit  Return in about 3 months (around 11/24/2022)  Subjective:      Patient ID: Eric Millan is a 57 y o  male  Chief Complaint   Patient presents with    Physical Exam     rmklpn       HPI    The following portions of the patient's history were reviewed and updated as appropriate: allergies, current medications, past family history, past medical history, past social history, past surgical history and problem list     Review of Systems      Current Outpatient Medications   Medication Sig Dispense Refill    metFORMIN (GLUCOPHAGE-XR) 500 mg 24 hr tablet Take 4 tablets (2,000 mg total) by mouth daily with breakfast 360 tablet 3    rosuvastatin (CRESTOR) 5 mg tablet Take 1 tablet (5 mg total) by mouth daily 90 tablet 3     No current facility-administered medications for this visit         Objective:    /80   Pulse 83   Temp (!) 97 4 °F (36 3 °C)   Resp 18   Ht 6' (1 829 m)   Wt 100 kg (221 lb)   SpO2 97%   BMI 29 97 kg/m² Physical Exam           Ren Hdz MD

## 2022-08-25 LAB
LEFT EYE DIABETIC RETINOPATHY: NORMAL
LEFT EYE IMAGE QUALITY: NORMAL
LEFT EYE MACULAR EDEMA: NORMAL
LEFT EYE OTHER RETINOPATHY: NORMAL
RIGHT EYE DIABETIC RETINOPATHY: NORMAL
RIGHT EYE IMAGE QUALITY: NORMAL
RIGHT EYE MACULAR EDEMA: NORMAL
RIGHT EYE OTHER RETINOPATHY: NORMAL
SEVERITY (EYE EXAM): NORMAL

## 2022-08-25 PROCEDURE — 2025F 7 FLD RTA PHOTO W/O RTNOPTHY: CPT | Performed by: INTERNAL MEDICINE

## 2022-09-14 ENCOUNTER — TELEPHONE (OUTPATIENT)
Dept: FAMILY MEDICINE CLINIC | Facility: CLINIC | Age: 62
End: 2022-09-14

## 2022-09-14 NOTE — TELEPHONE ENCOUNTER
Metformin takes 4 a day and now he is complaining about having diarrhea  Should he stop taking or have it adjusted or stagger the pills, because he is taking all 4 at once   Please call back

## 2022-09-20 NOTE — TELEPHONE ENCOUNTER
Spoke with wife and advised to have patient 2 pills daily  He will f/u with Dr Gerri Petty in Nov  No further action needed    Leslie Dhillon MA

## 2022-10-12 PROBLEM — Z00.00 HEALTHCARE MAINTENANCE: Status: RESOLVED | Noted: 2021-06-11 | Resolved: 2022-10-12

## 2022-10-31 ENCOUNTER — TELEPHONE (OUTPATIENT)
Dept: FAMILY MEDICINE CLINIC | Facility: CLINIC | Age: 62
End: 2022-10-31

## 2022-10-31 DIAGNOSIS — E11.22 TYPE 2 DIABETES MELLITUS WITH STAGE 2 CHRONIC KIDNEY DISEASE, WITHOUT LONG-TERM CURRENT USE OF INSULIN (HCC): Primary | ICD-10-CM

## 2022-10-31 DIAGNOSIS — N18.2 TYPE 2 DIABETES MELLITUS WITH STAGE 2 CHRONIC KIDNEY DISEASE, WITHOUT LONG-TERM CURRENT USE OF INSULIN (HCC): Primary | ICD-10-CM

## 2022-10-31 NOTE — TELEPHONE ENCOUNTER
Pt would like the dexcom meter so he doesn't have to prick his finger  Pls advise  States his last blood sugar is 266 taken today

## 2022-10-31 NOTE — TELEPHONE ENCOUNTER
I sent the order to his pharmacy  His insurance company may not approve this but we will give it a try

## 2022-11-02 ENCOUNTER — TELEPHONE (OUTPATIENT)
Dept: FAMILY MEDICINE CLINIC | Facility: CLINIC | Age: 62
End: 2022-11-02

## 2022-11-02 NOTE — TELEPHONE ENCOUNTER
Pt is calling but I don't see glucometer ordered and pharmacy doesn't have it  Goes to [facility]

## 2022-11-04 NOTE — TELEPHONE ENCOUNTER
Jose A Cade called about this prior auth for [facility] sensor   It is covered under the plan but still requires a PA  2 ways to submit a PA:    Cover my meds OR  Call Jose A Cade @ [phone]    [facility]

## 2022-11-09 RX ORDER — BLOOD-GLUCOSE TRANSMITTER
EACH MISCELLANEOUS 4 TIMES DAILY
Qty: 2 EACH | Refills: 5 | Status: SHIPPED | OUTPATIENT
Start: 2022-11-09

## 2022-11-09 RX ORDER — BLOOD-GLUCOSE,RECEIVER,CONT
EACH MISCELLANEOUS 4 TIMES DAILY
Qty: 1 EACH | Refills: 0 | Status: SHIPPED | OUTPATIENT
Start: 2022-11-09

## 2022-11-09 NOTE — TELEPHONE ENCOUNTER
It looks like the Dexcom sensor was ordered as a procedure   Can you re order it so it is under the medication list?  Clayton Rice LPN

## 2022-11-09 NOTE — TELEPHONE ENCOUNTER
PA done on Cover My Meds [facility] ID [id number]  Forms completed and denied  Pt could go on website and use  coupon  They are timed for usually 1 year   Please advise   andrew

## 2022-11-28 ENCOUNTER — OFFICE VISIT (OUTPATIENT)
Dept: OBGYN CLINIC | Facility: CLINIC | Age: 62
End: 2022-11-28

## 2022-11-28 ENCOUNTER — APPOINTMENT (OUTPATIENT)
Dept: RADIOLOGY | Facility: CLINIC | Age: 62
End: 2022-11-28

## 2022-11-28 VITALS
HEIGHT: 72 IN | BODY MASS INDEX: 29.53 KG/M2 | HEART RATE: 78 BPM | WEIGHT: 218 LBS | SYSTOLIC BLOOD PRESSURE: 130 MMHG | DIASTOLIC BLOOD PRESSURE: 88 MMHG

## 2022-11-28 DIAGNOSIS — M77.8 LEFT WRIST TENDONITIS: Primary | ICD-10-CM

## 2022-11-28 DIAGNOSIS — M25.532 PAIN IN LEFT WRIST: ICD-10-CM

## 2022-11-28 DIAGNOSIS — M25.532 PAIN IN LEFT WRIST: Primary | ICD-10-CM

## 2022-11-28 DIAGNOSIS — M77.8 LEFT WRIST TENDONITIS: ICD-10-CM

## 2022-11-28 NOTE — PROGRESS NOTES
Assessment/Plan:  1  Pain in left wrist  XR wrist 3+ vw left    MRI wrist left wo contrast      2  Left wrist tendonitis          The patient has ongoing pain about the left wrist for 6 months and has not responded to bracing and OTC medications thus far  I am ordering an MRI to further evaluate for soft tissue injury  I did provide him with a more supportive brace today  He will FU after his MRI to discuss the results and how to proceed  WE did discuss OT if no significant pathology is found  Subjective:   Mireille Espitia is a 60 y o  male who presents today for evaluation of his left wrist  He notes this began about 6 months ago with no inciting event prior to the onset of his symptoms  He notes most of his pain to be about the lateral wrist, which is worse with really any activity, especially rotation of the wrist  Rest help some  He has tried a wrist brace and OTC medications with minimal improvements  He notes good sensation of the left upper extremity  He does have a history of prior cyst excision of the volar radial wrist X 2, the most recent of which he thinks was about 10 years ago  He did have radial artery repair at that time as well, as he notes his artery was damaged during his recurrent cyst excision  Review of Systems   Constitutional: Negative  Negative for chills and fever  HENT: Negative  Negative for ear pain and sore throat  Eyes: Negative  Negative for pain and redness  Respiratory: Negative  Negative for shortness of breath and wheezing  Cardiovascular: Negative for chest pain and palpitations  Gastrointestinal: Negative  Negative for abdominal pain and blood in stool  Endocrine: Negative  Negative for polydipsia and polyuria  Genitourinary: Negative  Negative for difficulty urinating and dysuria  Musculoskeletal:        As noted in HPI   Skin: Negative  Negative for pallor and rash  Neurological: Negative  Negative for dizziness and numbness  Hematological: Negative  Negative for adenopathy  Does not bruise/bleed easily  Psychiatric/Behavioral: Negative  Negative for confusion and suicidal ideas  Past Medical History:   Diagnosis Date   • Benign essential hypertension     LAST ASSESSED 5/1/17; RESOLVED 5/1/17   • Chronic pain disorder    • Controlled insulin dependent diabetes mellitus     LAST ASSESSED 4/1/15; RESOLVED 5/2/16   • Diabetes mellitus ([facility] [address] ) Long time ago   • Hyperlipidemia    • Low back pain    • Neck pain    • Osteoarthritis    • Pericarditis    • Type 2 diabetes mellitus ([facility] [address] )        Past Surgical History:   Procedure Laterality Date   • KNEE SURGERY     • NERVE BLOCK Bilateral 7/19/2019    Procedure: C3 C4 C5 C6 Medial Branch Block #1 ([id number]); Surgeon: Marijean Lesches, MD;  Location: [facility] MAIN OR;  Service: Pain Management    • NERVE BLOCK Bilateral 8/2/2019    Procedure: C3 C4 C5 C6 Medial Branch Block #2 ([id number]);   Surgeon: Marijean Lesches, MD;  Location: [facility] MAIN OR;  Service: Pain Management    • RADIOFREQUENCY ABLATION Right 8/16/2019    Procedure: C3 C4 C5 C6 RADIOFREQUENCY ABLATION;  Surgeon: Marijean Lesches, MD;  Location: [facility] MAIN OR;  Service: Pain Management    • RADIOFREQUENCY ABLATION Left 8/30/2019    Procedure: C3 C4 C5 C6 RADIOFREQUENCY ABLATION;  Surgeon: Marijean Lesches, MD;  Location: [facility] MAIN OR;  Service: Pain Management    • REPLACEMENT TOTAL KNEE Right    • WRIST SURGERY         Family History   Problem Relation Age of Onset   • Cancer Family    • Cancer Mother    • Cancer Father    • No Known Problems Sister    • Cancer Brother    • No Known Problems Maternal Aunt    • No Known Problems Maternal Uncle    • No Known Problems Paternal Aunt    • No Known Problems Paternal Uncle    • No Known Problems Maternal Grandmother    • No Known Problems Maternal Grandfather    • No Known Problems Paternal Grandmother    • No Known Problems Paternal Grandfather    • Diabetes Brother Social History     Occupational History   • Not on file   Tobacco Use   • Smoking status: Never   • Smokeless tobacco: Never   Vaping Use   • Vaping Use: Never used   Substance and Sexual Activity   • Alcohol use: No   • Drug use: Never   • Sexual activity: Not Currently     Partners: Female         Current Outpatient Medications:   •  Continuous Blood Gluc  (Dexcom G5 Mobile ) ELROY, Use 4 (four) times a day, Disp: 1 each, Rfl: 0  •  Continuous Blood Gluc Transmit (Dexcom G5 Mobile Transmitter) MISC, Use 4 (four) times a day, Disp: 2 each, Rfl: 5  •  metFORMIN (GLUCOPHAGE-XR) 500 mg 24 hr tablet, Take 4 tablets (2,000 mg total) by mouth daily with breakfast, Disp: 360 tablet, Rfl: 3  •  rosuvastatin (CRESTOR) 5 mg tablet, Take 1 tablet (5 mg total) by mouth daily, Disp: 90 tablet, Rfl: 3    No Known Allergies    Objective:  Vitals:    11/28/22 0909   BP: 130/88   Pulse: 78       Left Hand Exam     Tenderness   Left hand tenderness location: ulnar wrist tenderness diffusely  Range of Motion   Wrist   Extension: 40 (with pain)   Flexion: 60 (with pain)   Pronation: normal   Supination: normal Left wrist supination: with pain  Other   Erythema: absent  Sensation: normal  Pulse: present            Physical Exam  Constitutional:       General: He is not in acute distress  Appearance: He is well-developed and well-nourished  HENT:      Head: Normocephalic and atraumatic  Eyes:      General: No scleral icterus  Extraocular Movements: EOM normal       Conjunctiva/sclera: Conjunctivae normal    Neck:      Vascular: No JVD  Cardiovascular:      Rate and Rhythm: Normal rate  Pulses: Intact distal pulses  Pulmonary:      Effort: Pulmonary effort is normal  No respiratory distress  Skin:     General: Skin is warm  Neurological:      Mental Status: He is alert and oriented to person, place, and time        Coordination: Coordination normal    Psychiatric:         Mood and Affect: Mood and affect normal          I have personally reviewed pertinent films in PACS and my interpretation is as follows:  Xrays left wrist: No acute osseous abnormality  This document was created using speech voice recognition software  Grammatical errors, random word insertions, pronoun errors, and incomplete sentences are an occasional consequence of this system due to software limitations, ambient noise, and hardware issues  Any formal questions or concerns about content, text, or information contained within the body of this dictation should be directly addressed to the provider for clarification

## 2022-11-29 ENCOUNTER — APPOINTMENT (OUTPATIENT)
Dept: LAB | Facility: HOSPITAL | Age: 62
End: 2022-11-29

## 2022-11-29 DIAGNOSIS — E11.22 TYPE 2 DIABETES MELLITUS WITH STAGE 2 CHRONIC KIDNEY DISEASE, WITHOUT LONG-TERM CURRENT USE OF INSULIN (HCC): ICD-10-CM

## 2022-11-29 DIAGNOSIS — N18.2 TYPE 2 DIABETES MELLITUS WITH STAGE 2 CHRONIC KIDNEY DISEASE, WITHOUT LONG-TERM CURRENT USE OF INSULIN (HCC): ICD-10-CM

## 2022-11-29 DIAGNOSIS — Z12.5 PROSTATE CANCER SCREENING: ICD-10-CM

## 2022-11-29 LAB
ALBUMIN SERPL BCP-MCNC: 4 G/DL (ref 3.5–5)
ALP SERPL-CCNC: 90 U/L (ref 46–116)
ALT SERPL W P-5'-P-CCNC: 18 U/L (ref 12–78)
ANION GAP SERPL CALCULATED.3IONS-SCNC: 8 MMOL/L (ref 4–13)
AST SERPL W P-5'-P-CCNC: 12 U/L (ref 5–45)
BASOPHILS # BLD AUTO: 0.05 THOUSANDS/ÂΜL (ref 0–0.1)
BASOPHILS NFR BLD AUTO: 1 % (ref 0–1)
BILIRUB SERPL-MCNC: 0.88 MG/DL (ref 0.2–1)
BUN SERPL-MCNC: 12 MG/DL (ref 5–25)
CALCIUM SERPL-MCNC: 9.9 MG/DL (ref 8.3–10.1)
CHLORIDE SERPL-SCNC: 105 MMOL/L (ref 96–108)
CHOLEST SERPL-MCNC: 98 MG/DL
CO2 SERPL-SCNC: 25 MMOL/L (ref 21–32)
CREAT SERPL-MCNC: 0.88 MG/DL (ref 0.6–1.3)
CREAT UR-MCNC: 99.8 MG/DL
EOSINOPHIL # BLD AUTO: 0.06 THOUSAND/ÂΜL (ref 0–0.61)
EOSINOPHIL NFR BLD AUTO: 1 % (ref 0–6)
ERYTHROCYTE [DISTWIDTH] IN BLOOD BY AUTOMATED COUNT: 12.2 % (ref 11.6–15.1)
EST. AVERAGE GLUCOSE BLD GHB EST-MCNC: 223 MG/DL
GFR SERPL CREATININE-BSD FRML MDRD: 91 ML/MIN/1.73SQ M
GLUCOSE P FAST SERPL-MCNC: 204 MG/DL (ref 65–99)
HBA1C MFR BLD: 9.4 %
HCT VFR BLD AUTO: 45.6 % (ref 36.5–49.3)
HDLC SERPL-MCNC: 40 MG/DL
HGB BLD-MCNC: 15.1 G/DL (ref 12–17)
IMM GRANULOCYTES # BLD AUTO: 0.02 THOUSAND/UL (ref 0–0.2)
IMM GRANULOCYTES NFR BLD AUTO: 0 % (ref 0–2)
LDLC SERPL CALC-MCNC: 42 MG/DL (ref 0–100)
LYMPHOCYTES # BLD AUTO: 1.73 THOUSANDS/ÂΜL (ref 0.6–4.47)
LYMPHOCYTES NFR BLD AUTO: 29 % (ref 14–44)
MCH RBC QN AUTO: 30.6 PG (ref 26.8–34.3)
MCHC RBC AUTO-ENTMCNC: 33.1 G/DL (ref 31.4–37.4)
MCV RBC AUTO: 92 FL (ref 82–98)
MICROALBUMIN UR-MCNC: 8.1 MG/L (ref 0–20)
MICROALBUMIN/CREAT 24H UR: 8 MG/G CREATININE (ref 0–30)
MONOCYTES # BLD AUTO: 0.54 THOUSAND/ÂΜL (ref 0.17–1.22)
MONOCYTES NFR BLD AUTO: 9 % (ref 4–12)
NEUTROPHILS # BLD AUTO: 3.64 THOUSANDS/ÂΜL (ref 1.85–7.62)
NEUTS SEG NFR BLD AUTO: 60 % (ref 43–75)
NONHDLC SERPL-MCNC: 58 MG/DL
NRBC BLD AUTO-RTO: 0 /100 WBCS
PLATELET # BLD AUTO: 231 THOUSANDS/UL (ref 149–390)
PMV BLD AUTO: 11.2 FL (ref 8.9–12.7)
POTASSIUM SERPL-SCNC: 3.8 MMOL/L (ref 3.5–5.3)
PROT SERPL-MCNC: 7.9 G/DL (ref 6.4–8.4)
PSA SERPL-MCNC: 0.5 NG/ML (ref 0–4)
RBC # BLD AUTO: 4.94 MILLION/UL (ref 3.88–5.62)
SODIUM SERPL-SCNC: 138 MMOL/L (ref 135–147)
TRIGL SERPL-MCNC: 79 MG/DL
WBC # BLD AUTO: 6.04 THOUSAND/UL (ref 4.31–10.16)

## 2022-11-30 ENCOUNTER — TELEPHONE (OUTPATIENT)
Dept: OBGYN CLINIC | Facility: HOSPITAL | Age: 62
End: 2022-11-30

## 2022-11-30 NOTE — TELEPHONE ENCOUNTER
Wilfredo Cardenas, JD [facility]    Doctor: [place]    Reason for call: calling for the most recent OVN to be faxed to them at [phone]    Faxed, as requested    Call back#: [phone]

## 2022-12-01 ENCOUNTER — OFFICE VISIT (OUTPATIENT)
Dept: FAMILY MEDICINE CLINIC | Facility: CLINIC | Age: 62
End: 2022-12-01

## 2022-12-01 VITALS
TEMPERATURE: 97.5 F | BODY MASS INDEX: 29.26 KG/M2 | WEIGHT: 216 LBS | OXYGEN SATURATION: 97 % | HEIGHT: 72 IN | DIASTOLIC BLOOD PRESSURE: 80 MMHG | SYSTOLIC BLOOD PRESSURE: 130 MMHG | HEART RATE: 75 BPM | RESPIRATION RATE: 16 BRPM

## 2022-12-01 DIAGNOSIS — E11.9 TYPE 2 DIABETES MELLITUS WITHOUT COMPLICATION, WITHOUT LONG-TERM CURRENT USE OF INSULIN (HCC): Primary | ICD-10-CM

## 2022-12-01 DIAGNOSIS — E78.5 HYPERLIPIDEMIA LDL GOAL <100: ICD-10-CM

## 2022-12-01 PROBLEM — S83.209A TEAR OF MENISCUS OF KNEE: Status: RESOLVED | Noted: 2018-09-16 | Resolved: 2022-12-01

## 2022-12-01 PROBLEM — E11.22 CKD STAGE 2 DUE TO TYPE 2 DIABETES MELLITUS (HCC): Status: RESOLVED | Noted: 2017-01-29 | Resolved: 2022-12-01

## 2022-12-01 PROBLEM — N18.2 CKD STAGE 2 DUE TO TYPE 2 DIABETES MELLITUS (HCC): Status: RESOLVED | Noted: 2017-01-29 | Resolved: 2022-12-01

## 2022-12-01 PROBLEM — E66.9 OBESITY (BMI 30-39.9): Status: RESOLVED | Noted: 2020-04-17 | Resolved: 2022-12-01

## 2022-12-01 PROBLEM — I10 PRIMARY HYPERTENSION: Status: ACTIVE | Noted: 2022-12-01

## 2022-12-01 RX ORDER — LISINOPRIL 10 MG/1
10 TABLET ORAL DAILY
Start: 2022-12-01 | End: 2022-12-09 | Stop reason: SDUPTHER

## 2022-12-01 RX ORDER — GLIMEPIRIDE 2 MG/1
2 TABLET ORAL
Qty: 90 TABLET | Refills: 3 | Status: SHIPPED | OUTPATIENT
Start: 2022-12-01 | End: 2023-05-30

## 2022-12-01 NOTE — ASSESSMENT & PLAN NOTE
Lab Results   Component Value Date    HGBA1C 9 4 (H) 11/29/2022     Improved but not to goal   He had hypoglycemia in the past with glimepiride but will add this back in at a lower dose  Discussed need for exercise and weight loss  Continue metformin as ordered  Advised on need for good foot and eye care

## 2022-12-01 NOTE — PROGRESS NOTES
Name: Sebastien Quinonez      : 1960      MRN: [id number]  Encounter Provider: Lucius Ta MD  Encounter Date: 2022   Encounter department: [address] [place] [address]  Type 2 diabetes mellitus without complication, without long-term current use of insulin ([facility])  Assessment & Plan:    Lab Results   Component Value Date    HGBA1C 9 4 (H) 2022     Improved but not to goal   He had hypoglycemia in the past with glimepiride but will add this back in at a lower dose  Discussed need for exercise and weight loss  Continue metformin as ordered  Advised on need for good foot and eye care  Orders:  -     glimepiride (AMARYL) 2 mg tablet; Take 1 tablet (2 mg total) by mouth daily with breakfast  -     Hemoglobin A1C; Future; Expected date: 2023  -     Comprehensive metabolic panel; Future; Expected date: 2023  -     CBC and Platelet; Future; Expected date: 2023  -     Lipid Panel with Direct LDL reflex; Future; Expected date: 2023  -     lisinopril (ZESTRIL) 10 mg tablet; Take 1 tablet (10 mg total) by mouth daily    2  Hyperlipidemia LDL goal <100  Assessment & Plan:  Continue rosuvastatin  Lipids were reviewed and are at goal     Orders:  -     glimepiride (AMARYL) 2 mg tablet; Take 1 tablet (2 mg total) by mouth daily with breakfast  -     Hemoglobin A1C; Future; Expected date: 2023  -     Comprehensive metabolic panel; Future; Expected date: 2023  -     CBC and Platelet; Future; Expected date: 2023  -     Lipid Panel with Direct LDL reflex; Future; Expected date: 2023           Subjective      Here for DM follow up  He is happy that his A1C is improved but it is still not to goal   He has been compliant with DM meds but has not been taking his lisinopril  There are no hypoglycemic episodes  Denies chest pain or dyspnea  Up to date with eye exam     Review of Systems   Constitutional: Negative      Respiratory: Negative  Cardiovascular: Negative  Endocrine: Negative  Current Outpatient Medications on File Prior to Visit   Medication Sig   • metFORMIN (GLUCOPHAGE-XR) 500 mg 24 hr tablet Take 4 tablets (2,000 mg total) by mouth daily with breakfast   • rosuvastatin (CRESTOR) 5 mg tablet Take 1 tablet (5 mg total) by mouth daily   • [DISCONTINUED] Continuous Blood Gluc  (Dexcom G5 Mobile ) ELROY Use 4 (four) times a day   • [DISCONTINUED] Continuous Blood Gluc Transmit (Dexcom G5 Mobile Transmitter) MISC Use 4 (four) times a day       Objective     /80 (BP Location: Left arm, Patient Position: Sitting, Cuff Size: Large)   Pulse 75   Temp 97 5 °F (36 4 °C) (Temporal)   Resp 16   Ht 6' (1 829 m)   Wt 98 kg (216 lb)   SpO2 97%   BMI 29 29 kg/m²     Physical Exam  Constitutional:       Appearance: He is well-developed and well-nourished  Eyes:      Conjunctiva/sclera: Conjunctivae normal    Neck:      Thyroid: No thyromegaly  Vascular: No JVD  Cardiovascular:      Rate and Rhythm: Normal rate and regular rhythm  Pulses: Intact distal pulses  no weak pulses          Dorsalis pedis pulses are 2+ on the right side and 2+ on the left side  Heart sounds: Normal heart sounds  No murmur heard  No friction rub  No gallop  Pulmonary:      Effort: Pulmonary effort is normal       Breath sounds: Normal breath sounds  No wheezing or rales  Abdominal:      General: Bowel sounds are normal  There is no distension  Palpations: Abdomen is soft  Tenderness: There is no abdominal tenderness  Musculoskeletal:         General: No edema  Cervical back: Neck supple  Feet:      Right foot:      Skin integrity: No ulcer, skin breakdown, erythema, warmth, callus or dry skin  Left foot:      Skin integrity: No ulcer, skin breakdown, erythema, warmth, callus or dry skin  Patient's shoes and socks removed      Right Foot/Ankle   Right Foot Inspection  Skin Exam: skin normal and skin intact  No dry skin, no warmth, no callus, no erythema, no maceration, no abnormal color, no pre-ulcer, no ulcer and no callus  Toe Exam: ROM and strength within normal limits  Sensory   Monofilament testing: intact    Vascular  Capillary refills: < 3 seconds  The right DP pulse is 2+  Left Foot/Ankle  Left Foot Inspection  Skin Exam: skin normal and skin intact  No dry skin, no warmth, no erythema, no maceration, normal color, no pre-ulcer, no ulcer and no callus  Toe Exam: ROM and strength within normal limits  Sensory   Monofilament testing: intact    Vascular  Capillary refills: < 3 seconds  The left DP pulse is 2+       Assign Risk Category  No deformity present  No loss of protective sensation  No weak pulses  Risk: 0      Keyla Turcios MD

## 2022-12-09 DIAGNOSIS — E11.9 TYPE 2 DIABETES MELLITUS WITHOUT COMPLICATION, WITHOUT LONG-TERM CURRENT USE OF INSULIN (HCC): ICD-10-CM

## 2022-12-09 RX ORDER — LISINOPRIL 10 MG/1
10 TABLET ORAL DAILY
Qty: 90 TABLET | Refills: 0 | Status: SHIPPED | OUTPATIENT
Start: 2022-12-09

## 2022-12-19 ENCOUNTER — OFFICE VISIT (OUTPATIENT)
Dept: OBGYN CLINIC | Facility: CLINIC | Age: 62
End: 2022-12-19

## 2022-12-19 VITALS
SYSTOLIC BLOOD PRESSURE: 125 MMHG | BODY MASS INDEX: 30.37 KG/M2 | WEIGHT: 224.2 LBS | DIASTOLIC BLOOD PRESSURE: 71 MMHG | HEIGHT: 72 IN | HEART RATE: 71 BPM

## 2022-12-19 DIAGNOSIS — M19.032 OSTEOARTHRITIS OF LEFT WRIST, UNSPECIFIED OSTEOARTHRITIS TYPE: Primary | ICD-10-CM

## 2022-12-19 DIAGNOSIS — Z98.890 HISTORY OF REMOVAL OF CYST: ICD-10-CM

## 2022-12-19 RX ORDER — DEXAMETHASONE SODIUM PHOSPHATE 10 MG/ML
40 INJECTION, SOLUTION INTRAMUSCULAR; INTRAVENOUS
Status: COMPLETED | OUTPATIENT
Start: 2022-12-19 | End: 2022-12-19

## 2022-12-19 RX ADMIN — DEXAMETHASONE SODIUM PHOSPHATE 40 MG: 10 INJECTION, SOLUTION INTRAMUSCULAR; INTRAVENOUS at 10:42

## 2022-12-19 NOTE — PROGRESS NOTES
Assessment/Plan:  1  Osteoarthritis of left wrist, unspecified osteoarthritis type  Medium joint arthrocentesis: L ulnocarpal      2  History of removal of cyst          Scribe Attestation    I,:  Nabeel Sánchez am acting as a scribe while in the presence of the attending physician :       I,:  Kaushik Rodriguez MD personally performed the services described in this documentation    as scribed in my presence :         Donato Lam upon exam today demonstrates signs symptoms of osteoarthritis of the left wrist   I did review his left wrist MRI which remarks on multiple small subcortical cysts and arthritic changes in the ulnar aspect of the wrist  There is also a large cyst in the volar radial aspect of the wrist where his prior  cyst removal procedures have occurred  His clinical exam does not match his MRI results  I did discuss there are treatment options including continuing wearing the brace, physical therapy, and steroid injection  He states he does not believe in physical therapy and has elected to receive a steroid injection into the ulnar aspect of the wrist at the site of his pain today for possible pain relief  I did discuss that there is no guarantee for symptomatic relief following steroid injection  He is allowed up to 2 injections per year, one approximately every 6 months  He verbalized understanding and tolerated the injections well without complications  He may follow-up with me on an as-needed basis  Medium joint arthrocentesis: L ulnocarpal  Universal Protocol:  Consent: Verbal consent obtained  Risks and benefits: risks, benefits and alternatives were discussed  Consent given by: patient  Time out: Immediately prior to procedure a "time out" was called to verify the correct patient, procedure, equipment, support staff and site/side marked as required    Timeout called at: 12/19/2022 10:38 AM   Patient understanding: patient states understanding of the procedure being performed  Site marked: the operative site was marked  Patient identity confirmed: verbally with patient    Supporting Documentation  Indications: pain   Procedure Details  Location: wrist - L ulnocarpal  Preparation: Patient was prepped and draped in the usual sterile fashion  Ultrasound guidance: no  Approach: anteromedial  Medications administered: 40 mg dexamethasone 100 mg/10 mL (1 mL ropivacaine HCL 0 5% 5mg/mL)    Patient tolerance: patient tolerated the procedure well with no immediate complications  Dressing:  Sterile dressing applied          Subjective:   Tony Sharpe  is a 60 y o  male who presents for follow up evaluation of his left wrist  He received an MRI to evaluate the left wrist and is here today to review it  He states his pain has not improved since his prior appointment  Pain is located at the ulnar aspect of the wrist  He states pain is worse with pronation  He still performs his work responsibilities with pain and difficulty  He states his strength is still good and unchanged since the onset of pain  He wears a wrist brace which he says helps stabilize the wrist prevent pronation of her wrist which causes pain  He does have history of 2 cyst removals on the anterior radial aspect of the wrist with arterial damage however this is not the same area of pain today  Review of Systems   Constitutional: Negative for chills, fever and unexpected weight change  HENT: Negative for nosebleeds and sore throat  Eyes: Negative for pain, redness and visual disturbance  Respiratory: Negative for cough, shortness of breath and wheezing  Cardiovascular: Negative for chest pain, palpitations and leg swelling  Gastrointestinal: Negative for nausea and vomiting  Endocrine: Negative for polydipsia and polyuria  Genitourinary: Negative for dysuria and hematuria  Musculoskeletal: Positive for arthralgias, joint swelling and myalgias  See HPI   Skin: Negative for rash and wound  Neurological: Negative for dizziness, numbness and headaches  Psychiatric/Behavioral: Negative for decreased concentration  The patient is not nervous/anxious  Past Medical History:   Diagnosis Date   • Benign essential hypertension     LAST ASSESSED 5/1/17; RESOLVED 5/1/17   • Chronic pain disorder    • Controlled insulin dependent diabetes mellitus     LAST ASSESSED 4/1/15; RESOLVED 5/2/16   • Diabetes mellitus ([facility] [address] ) Long time ago   • Hyperlipidemia    • Low back pain    • Neck pain    • Osteoarthritis    • Pericarditis    • Type 2 diabetes mellitus ([facility] [address] )        Past Surgical History:   Procedure Laterality Date   • KNEE SURGERY     • NERVE BLOCK Bilateral 7/19/2019    Procedure: C3 C4 C5 C6 Medial Branch Block #1 ([id number]); Surgeon: Tobin Yanez MD;  Location: [facility] MAIN OR;  Service: Pain Management    • NERVE BLOCK Bilateral 8/2/2019    Procedure: C3 C4 C5 C6 Medial Branch Block #2 ([id number]);   Surgeon: Tobin Yanez MD;  Location: [facility] MAIN OR;  Service: Pain Management    • RADIOFREQUENCY ABLATION Right 8/16/2019    Procedure: C3 C4 C5 C6 RADIOFREQUENCY ABLATION;  Surgeon: Tobin Yanez MD;  Location: [facility] MAIN OR;  Service: Pain Management    • RADIOFREQUENCY ABLATION Left 8/30/2019    Procedure: C3 C4 C5 C6 RADIOFREQUENCY ABLATION;  Surgeon: Tobin Yanez MD;  Location: [facility] MAIN OR;  Service: Pain Management    • REPLACEMENT TOTAL KNEE Right    • WRIST SURGERY         Family History   Problem Relation Age of Onset   • Cancer Family    • Cancer Mother    • Cancer Father    • No Known Problems Sister    • Cancer Brother    • No Known Problems Maternal Aunt    • No Known Problems Maternal Uncle    • No Known Problems Paternal Aunt    • No Known Problems Paternal Uncle    • No Known Problems Maternal Grandmother    • No Known Problems Maternal Grandfather    • No Known Problems Paternal Grandmother    • No Known Problems Paternal Grandfather    • Diabetes Brother Social History     Occupational History   • Not on file   Tobacco Use   • Smoking status: Never   • Smokeless tobacco: Never   Vaping Use   • Vaping Use: Never used   Substance and Sexual Activity   • Alcohol use: No   • Drug use: Never   • Sexual activity: Not Currently     Partners: Female         Current Outpatient Medications:   •  glimepiride (AMARYL) 2 mg tablet, Take 1 tablet (2 mg total) by mouth daily with breakfast, Disp: 90 tablet, Rfl: 3  •  lisinopril (ZESTRIL) 10 mg tablet, Take 1 tablet (10 mg total) by mouth daily, Disp: 90 tablet, Rfl: 0  •  metFORMIN (GLUCOPHAGE-XR) 500 mg 24 hr tablet, Take 4 tablets (2,000 mg total) by mouth daily with breakfast, Disp: 360 tablet, Rfl: 3  •  rosuvastatin (CRESTOR) 5 mg tablet, Take 1 tablet (5 mg total) by mouth daily, Disp: 90 tablet, Rfl: 3    No Known Allergies    Objective:  Vitals:    12/19/22 0938   BP: 125/71   Pulse: 71       Left Hand Exam     Tenderness   The patient is experiencing tenderness in the ulnar area (ulnar styloid)  Range of Motion   Wrist   Extension: 50     Other   Scars: present (well healed surgical incisions)  Sensation: normal  Pulse: present (2+ ulnar pulse)            Physical Exam  HENT:      Head: Normocephalic and atraumatic  Eyes:      General:         Right eye: No discharge  Left eye: No discharge  Conjunctiva/sclera: Conjunctivae normal       Pupils: Pupils are equal, round, and reactive to light  Cardiovascular:      Rate and Rhythm: Normal rate  Pulmonary:      Effort: Pulmonary effort is normal  No respiratory distress  Musculoskeletal:         General: Swelling and tenderness present  Left hand: Swelling present  Decreased range of motion  Cervical back: Normal range of motion and neck supple  Comments: See HPI   Skin:     General: Skin is warm and dry  Neurological:      Mental Status: He is alert and oriented to person, place, and time             I have personally reviewed pertinent films in anterior PACS and my interpretation is as follows:  MRI of the left wrist demonstrates a large cyst in the volar radial aspect of the wrist and multiple small subcortical cysts in the ulnar carpal aspect of the hand  There is evidence of degenerative changes diffusely in the wrist and hand  This document was created using speech voice recognition software  Grammatical errors, random word insertions, pronoun errors, and incomplete sentences are an occasional consequence of this system due to software limitations, ambient noise, and hardware issues  Any formal questions or concerns about content, text, or information contained within the body of this dictation should be directly addressed to the provider for clarification

## 2022-12-21 ENCOUNTER — IMMUNIZATIONS (OUTPATIENT)
Dept: FAMILY MEDICINE CLINIC | Facility: CLINIC | Age: 62
End: 2022-12-21

## 2022-12-21 DIAGNOSIS — Z23 NEED FOR VACCINATION: Primary | ICD-10-CM

## 2023-02-23 DIAGNOSIS — E11.9 TYPE 2 DIABETES MELLITUS WITHOUT COMPLICATION, WITHOUT LONG-TERM CURRENT USE OF INSULIN (HCC): ICD-10-CM

## 2023-02-23 RX ORDER — LISINOPRIL 10 MG/1
TABLET ORAL
Qty: 90 TABLET | Refills: 0 | Status: SHIPPED | OUTPATIENT
Start: 2023-02-23

## 2023-04-03 ENCOUNTER — APPOINTMENT (OUTPATIENT)
Dept: LAB | Facility: HOSPITAL | Age: 63
End: 2023-04-03
Attending: INTERNAL MEDICINE

## 2023-04-03 DIAGNOSIS — E11.9 TYPE 2 DIABETES MELLITUS WITHOUT COMPLICATION, WITHOUT LONG-TERM CURRENT USE OF INSULIN (HCC): ICD-10-CM

## 2023-04-03 DIAGNOSIS — E78.5 HYPERLIPIDEMIA LDL GOAL <100: ICD-10-CM

## 2023-04-03 LAB
ALBUMIN SERPL BCP-MCNC: 4.3 G/DL (ref 3.5–5)
ALP SERPL-CCNC: 62 U/L (ref 34–104)
ALT SERPL W P-5'-P-CCNC: 13 U/L (ref 7–52)
ANION GAP SERPL CALCULATED.3IONS-SCNC: 6 MMOL/L (ref 4–13)
AST SERPL W P-5'-P-CCNC: 11 U/L (ref 13–39)
BILIRUB SERPL-MCNC: 0.49 MG/DL (ref 0.2–1)
BUN SERPL-MCNC: 9 MG/DL (ref 5–25)
CALCIUM SERPL-MCNC: 9.3 MG/DL (ref 8.4–10.2)
CHLORIDE SERPL-SCNC: 106 MMOL/L (ref 96–108)
CHOLEST SERPL-MCNC: 89 MG/DL
CO2 SERPL-SCNC: 29 MMOL/L (ref 21–32)
CREAT SERPL-MCNC: 0.93 MG/DL (ref 0.6–1.3)
ERYTHROCYTE [DISTWIDTH] IN BLOOD BY AUTOMATED COUNT: 12.6 % (ref 11.6–15.1)
GFR SERPL CREATININE-BSD FRML MDRD: 87 ML/MIN/1.73SQ M
GLUCOSE P FAST SERPL-MCNC: 142 MG/DL (ref 65–99)
HCT VFR BLD AUTO: 44 % (ref 36.5–49.3)
HDLC SERPL-MCNC: 39 MG/DL
HGB BLD-MCNC: 14.5 G/DL (ref 12–17)
LDLC SERPL CALC-MCNC: 37 MG/DL (ref 0–100)
MCH RBC QN AUTO: 30.7 PG (ref 26.8–34.3)
MCHC RBC AUTO-ENTMCNC: 33 G/DL (ref 31.4–37.4)
MCV RBC AUTO: 93 FL (ref 82–98)
PLATELET # BLD AUTO: 221 THOUSANDS/UL (ref 149–390)
PMV BLD AUTO: 11.1 FL (ref 8.9–12.7)
POTASSIUM SERPL-SCNC: 4 MMOL/L (ref 3.5–5.3)
PROT SERPL-MCNC: 6.7 G/DL (ref 6.4–8.4)
RBC # BLD AUTO: 4.73 MILLION/UL (ref 3.88–5.62)
SODIUM SERPL-SCNC: 141 MMOL/L (ref 135–147)
TRIGL SERPL-MCNC: 65 MG/DL
WBC # BLD AUTO: 5.83 THOUSAND/UL (ref 4.31–10.16)

## 2023-04-04 LAB
EST. AVERAGE GLUCOSE BLD GHB EST-MCNC: 157 MG/DL
HBA1C MFR BLD: 7.1 %

## 2023-04-06 ENCOUNTER — OFFICE VISIT (OUTPATIENT)
Dept: FAMILY MEDICINE CLINIC | Facility: CLINIC | Age: 63
End: 2023-04-06

## 2023-04-06 VITALS
DIASTOLIC BLOOD PRESSURE: 70 MMHG | BODY MASS INDEX: 31.15 KG/M2 | SYSTOLIC BLOOD PRESSURE: 140 MMHG | HEART RATE: 89 BPM | WEIGHT: 230 LBS | RESPIRATION RATE: 16 BRPM | TEMPERATURE: 97.8 F | HEIGHT: 72 IN | OXYGEN SATURATION: 97 %

## 2023-04-06 DIAGNOSIS — E11.9 TYPE 2 DIABETES MELLITUS WITHOUT COMPLICATION, WITHOUT LONG-TERM CURRENT USE OF INSULIN (HCC): Primary | ICD-10-CM

## 2023-04-06 DIAGNOSIS — E78.5 HYPERLIPIDEMIA LDL GOAL <100: ICD-10-CM

## 2023-04-06 DIAGNOSIS — I10 PRIMARY HYPERTENSION: ICD-10-CM

## 2023-04-06 NOTE — PROGRESS NOTES
Name: Campbell Peters      : 1960      MRN: [id number]  Encounter Provider: Shirin Mancini MD  Encounter Date: 2023   Encounter department: [address] [place] [address]     1  Type 2 diabetes mellitus without complication, without long-term current use of insulin ([facility])  Assessment & Plan:    Lab Results   Component Value Date    HGBA1C 7 1 (H) 2023   Control has improved and will continue metformin and glimepiride as ordered  Encouraged continued healthy diet with limitations of sweet snacks  Discussed need for good foot and eye care  Orders:  -     Hemoglobin A1C; Future; Expected date: 2023  -     Comprehensive metabolic panel; Future; Expected date: 2023  -     CBC and Platelet; Future; Expected date: 2023  -     Lipid Panel with Direct LDL reflex; Future; Expected date: 2023    2  Primary hypertension  Assessment & Plan:  Well controlled on zestril and will continue as ordered  Orders:  -     Hemoglobin A1C; Future; Expected date: 2023  -     Comprehensive metabolic panel; Future; Expected date: 2023  -     CBC and Platelet; Future; Expected date: 2023  -     Lipid Panel with Direct LDL reflex; Future; Expected date: 2023    3  Hyperlipidemia LDL goal <100  Assessment & Plan:  Reviewed labs with patient and his wife, well controlled and lipids are at goal  Continue rosuvastatin  Orders:  -     Hemoglobin A1C; Future; Expected date: 2023  -     Comprehensive metabolic panel; Future; Expected date: 2023  -     CBC and Platelet; Future; Expected date: 2023  -     Lipid Panel with Direct LDL reflex; Future; Expected date: 2023        Depression Screening and Follow-up Plan: Patient was screened for depression during today's encounter  They screened negative with a PHQ-2 score of 0  Subjective      Here for follow up of DM and other issues    He feels well and denies hypoglycemia since glimepiride was added  There are no side effects  He has occasional sweets but for the most part is trying to follow a healthy, low carb diet  Is not really exercising  Denies other complaints or issues  Review of Systems   Constitutional: Negative  Respiratory: Negative  Cardiovascular: Negative  Endocrine: Negative  Current Outpatient Medications on File Prior to Visit   Medication Sig   • glimepiride (AMARYL) 2 mg tablet Take 1 tablet (2 mg total) by mouth daily with breakfast   • lisinopril (ZESTRIL) 10 mg tablet TAKE ONE TABLET BY MOUTH EVERY DAY   • metFORMIN (GLUCOPHAGE-XR) 500 mg 24 hr tablet Take 4 tablets (2,000 mg total) by mouth daily with breakfast   • rosuvastatin (CRESTOR) 5 mg tablet Take 1 tablet (5 mg total) by mouth daily       Objective     /70   Pulse 89   Temp 97 8 °F (36 6 °C)   Resp 16   Ht 6' (1 829 m)   Wt 104 kg (230 lb)   SpO2 97%   BMI 31 19 kg/m²     Physical Exam  Constitutional:       Appearance: He is well-developed  Eyes:      Conjunctiva/sclera: Conjunctivae normal    Neck:      Thyroid: No thyromegaly  Vascular: No JVD  Cardiovascular:      Rate and Rhythm: Normal rate and regular rhythm  Heart sounds: Normal heart sounds  No murmur heard  No friction rub  No gallop  Pulmonary:      Effort: Pulmonary effort is normal       Breath sounds: Normal breath sounds  No wheezing or rales  Abdominal:      General: Bowel sounds are normal  There is no distension  Palpations: Abdomen is soft  Tenderness: There is no abdominal tenderness  Musculoskeletal:      Cervical back: Neck supple         Dai Lloyd MD

## 2023-04-07 NOTE — ASSESSMENT & PLAN NOTE
Lab Results   Component Value Date    HGBA1C 7 1 (H) 04/03/2023   Control has improved and will continue metformin and glimepiride as ordered  Encouraged continued healthy diet with limitations of sweet snacks  Discussed need for good foot and eye care

## 2023-04-07 NOTE — ASSESSMENT & PLAN NOTE
Reviewed labs with patient and his wife, well controlled and lipids are at goal  Continue rosuvastatin

## 2023-05-05 ENCOUNTER — APPOINTMENT (OUTPATIENT)
Dept: RADIOLOGY | Facility: CLINIC | Age: 63
End: 2023-05-05

## 2023-05-05 ENCOUNTER — OFFICE VISIT (OUTPATIENT)
Dept: URGENT CARE | Facility: CLINIC | Age: 63
End: 2023-05-05

## 2023-05-05 VITALS
BODY MASS INDEX: 36.98 KG/M2 | DIASTOLIC BLOOD PRESSURE: 81 MMHG | WEIGHT: 279 LBS | HEIGHT: 73 IN | HEART RATE: 73 BPM | SYSTOLIC BLOOD PRESSURE: 163 MMHG | TEMPERATURE: 97.9 F | RESPIRATION RATE: 20 BRPM | OXYGEN SATURATION: 99 %

## 2023-05-05 DIAGNOSIS — S69.91XA HAND INJURY, RIGHT, INITIAL ENCOUNTER: Primary | ICD-10-CM

## 2023-05-05 DIAGNOSIS — S69.91XA HAND INJURY, RIGHT, INITIAL ENCOUNTER: ICD-10-CM

## 2023-05-05 NOTE — PROGRESS NOTES
[facility]        NAME: Bria Sales is a 60 y o  male  : 1960    MRN: [id number]  DATE: May 5, 2023  TIME: 11:57 AM    Assessment and Plan   Hand injury, right, initial encounter [S69 [id number]]  1  Hand injury, right, initial encounter  Ambulatory Referral to Orthopedic Surgery    CANCELED: XR hand 3+ vw right            Patient Instructions   Right Thumb Sprain:   -There is no obvious sign of dislocation or fracture on X-ray  Awaiting official read  Will place in thumb spica for comfort and support  -Ice the area for 20 minutes 3-4 times a day  -Elevate the area and rest   -You can take Advil or Tylenol for the pain  -Avoid strenuous activity/sports or gym until your symptoms improve  -Follow up with Dr Cruz For further evaluation and management         Follow up with PCP in 3-5 days  Proceed to  ER if symptoms worsen  Chief Complaint     Chief Complaint   Patient presents with    Hand Injury     Right hand injury occurred approx 1 week ago  History of Present Illness       The patient is a 24-year-old male who presents today for R sided hand injury x 1 week  The patient states that 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 and impacted the machine over the thenar eminence  He had bruising initially over the thumb and MCP joint of the R side  He has pain with gripping and ROM of the thumb  He has no weakness, numbness or tingling of the RUE  No current erythema, edema or ecchymosis  No fever or chills  He has been taking Advil for the pain  No previous fracture of the R hand  Review of Systems   Review of Systems   Constitutional: Negative for activity change, appetite change, chills, fatigue and fever  Musculoskeletal: Positive for arthralgias  Negative for back pain, gait problem, joint swelling, myalgias, neck pain and neck stiffness  Skin: Negative for color change, pallor, rash and wound     Allergic/Immunologic: Negative for immunocompromised state  Neurological: Negative for dizziness, weakness, light-headedness and numbness  Hematological: Does not bruise/bleed easily  Current Medications       Current Outpatient Medications:     glimepiride (AMARYL) 2 mg tablet, Take 1 tablet (2 mg total) by mouth daily with breakfast, Disp: 90 tablet, Rfl: 3    lisinopril (ZESTRIL) 10 mg tablet, TAKE ONE TABLET BY MOUTH EVERY DAY, Disp: 90 tablet, Rfl: 0    metFORMIN (GLUCOPHAGE-XR) 500 mg 24 hr tablet, Take 4 tablets (2,000 mg total) by mouth daily with breakfast, Disp: 360 tablet, Rfl: 3    rosuvastatin (CRESTOR) 5 mg tablet, Take 1 tablet (5 mg total) by mouth daily, Disp: 90 tablet, Rfl: 3    Current Allergies     Allergies as of 05/05/2023    (No Known Allergies)            The following portions of the patient's history were reviewed and updated as appropriate: allergies, current medications, past family history, past medical history, past social history, past surgical history and problem list      Past Medical History:   Diagnosis Date    Benign essential hypertension     LAST ASSESSED 5/1/17; RESOLVED 5/1/17    Chronic pain disorder     Controlled insulin dependent diabetes mellitus     LAST ASSESSED 4/1/15; RESOLVED 5/2/16    Diabetes mellitus ([facility] [address] ) Long time ago    Hyperlipidemia     Low back pain     Neck pain     Osteoarthritis     Pericarditis     Type 2 diabetes mellitus ([facility] [address] )        Past Surgical History:   Procedure Laterality Date    KNEE SURGERY      NERVE BLOCK Bilateral 7/19/2019    Procedure: C3 C4 C5 C6 Medial Branch Block #1 ([id number]); Surgeon: Vikki Orr MD;  Location: [facility] OR;  Service: Pain Management     NERVE BLOCK Bilateral 8/2/2019    Procedure: C3 C4 C5 C6 Medial Branch Block #2 ([id number]);   Surgeon: Vikki Orr MD;  Location: [facility] OR;  Service: Pain Management     RADIOFREQUENCY ABLATION Right 8/16/2019    Procedure: C3 C4 C5 C6 RADIOFREQUENCY "ABLATION;  Surgeon: Daria Monk MD;  Location: [facility] MAIN OR;  Service: Pain Management     RADIOFREQUENCY ABLATION Left 8/30/2019    Procedure: C3 C4 C5 C6 RADIOFREQUENCY ABLATION;  Surgeon: Daria Monk MD;  Location: [facility] MAIN OR;  Service: Pain Management     REPLACEMENT TOTAL KNEE Right     WRIST SURGERY         Family History   Problem Relation Age of Onset    Cancer Family     Cancer Mother     Cancer Father     No Known Problems Sister     Cancer Brother     No Known Problems Maternal Aunt     No Known Problems Maternal Uncle     No Known Problems Paternal Aunt     No Known Problems Paternal Uncle     No Known Problems Maternal Grandmother     No Known Problems Maternal Grandfather     No Known Problems Paternal Grandmother     No Known Problems Paternal Grandfather     Diabetes Brother          Medications have been verified  Objective   /81   Pulse 73   Temp 97 9 °F (36 6 °C)   Resp 20   Ht 6' 1\" (1 854 m)   Wt 127 kg (279 lb)   SpO2 99%   BMI 36 81 kg/m²   No LMP for male patient  Physical Exam     Physical Exam  Vitals and nursing note reviewed  Constitutional:       General: He is not in acute distress  Appearance: He is well-developed  He is not ill-appearing, toxic-appearing or diaphoretic  Cardiovascular:      Rate and Rhythm: Normal rate and regular rhythm  Heart sounds: Normal heart sounds  Pulmonary:      Effort: Pulmonary effort is normal       Breath sounds: Normal breath sounds  Musculoskeletal:      Right wrist: Normal  No swelling, deformity, tenderness, bony tenderness, snuff box tenderness or crepitus  Normal range of motion  Right hand: Swelling and tenderness present  No deformity or bony tenderness  Decreased range of motion  Normal strength  Normal sensation  There is no disruption of two-point discrimination  Normal capillary refill  Normal pulse        Comments: RUE: There is tenderness over the MCP joint of the R " first digit, mild swelling of the R thumb  He has limited ROM with flexion of the thumb  No erythema, ecchymosis  No crepitus of the joint  No warmth  Skin is intact  Strength and sensation is intact  Skin:     General: Skin is warm and dry  Capillary Refill: Capillary refill takes less than 2 seconds  Findings: No bruising, ecchymosis or erythema  Neurological:      Sensory: Sensation is intact  No sensory deficit  Motor: No weakness or abnormal muscle tone        Gait: Gait normal    Psychiatric:         Behavior: Behavior normal

## 2023-05-05 NOTE — PATIENT INSTRUCTIONS
Hand Sprain   WHAT YOU NEED TO KNOW:   A hand sprain is when a ligament in your hand is stretched or torn  Ligaments are the strong tissues that connect bones  You may have bruising, pain, and swelling of your injured hand  DISCHARGE INSTRUCTIONS:   Call your doctor if:   The skin of your injured hand looks bluish or pale (less color than normal)  You have increased swelling and pain in your hand  You have new or increased numbness in your injured hand  You have new or increased stiffness or trouble moving your injured hand  You have questions or concerns about your injury or treatment  Medicines:   NSAIDs  help decrease swelling and pain or fever  This medicine is available with or without a doctor's order  NSAIDs can cause stomach bleeding or kidney problems in certain people  If you take blood thinner medicine, always ask your healthcare provider if NSAIDs are safe for you  Always read the medicine label and follow directions  Take your medicine as directed  Contact your healthcare provider if you think your medicine is not helping or if you have side effects  Tell your provider if you are allergic to any medicine  Keep a list of the medicines, vitamins, and herbs you take  Include the amounts, and when and why you take them  Bring the list or the pill bottles to follow-up visits  Carry your medicine list with you in case of an emergency  Rest your hand: You will need to rest your hand for 1 to 2 days after your injury  This will help decrease the risk of more damage to your hand  Do not lift anything with your injured hand  Ask your healthcare provider when you can return to your normal activities  Ice your hand:  Ice your hand to help decrease swelling and pain  Put crushed ice in a plastic bag and cover it with a towel  Put the ice on your hand for 15 to 20 minutes every hour  Use ice as directed    Use compression:  Compression (tight hold) provides support and helps decrease swelling and movement so your hand can heal  You may need to keep your hand wrapped with an elastic bandage  Elevate your hand:  Keep your injured hand raised above the level of your heart as often as you can  This will help decrease or limit swelling  You can elevate your hand by resting your arm up on a pillow  Use a splint:  You may need to use a splint on your hand and wrist  A splint is a special device that keeps your hand and wrist from moving  Use the splint as directed  Exercise your hand: You may be given exercises to improve your strength once you are able to move your hand without pain  Exercises will also help decrease stiffness  Start your exercises and normal activities slowly  Exercise your hand as directed  Follow up with your doctor as directed:  Write down your questions you so you remember to ask them during your visits  © Copyright [facility] 2022 Information is for End User's use only and may not be sold, redistributed or otherwise used for commercial purposes  The above information is an  only  It is not intended as medical advice for individual conditions or treatments  Talk to your doctor, nurse or pharmacist before following any medical regimen to see if it is safe and effective for you  Right Thumb Sprain:   -There is no obvious sign of dislocation or fracture on X-ray  Awaiting official read  Will place in thumb spica for comfort and support     -Ice the area for 20 minutes 3-4 times a day  -Elevate the area and rest   -You can take Advil or Tylenol for the pain  -Avoid strenuous activity/sports or gym until your symptoms improve  -Follow up with Dr Cruz For further evaluation and management

## 2023-06-01 ENCOUNTER — OFFICE VISIT (OUTPATIENT)
Dept: OBGYN CLINIC | Facility: CLINIC | Age: 63
End: 2023-06-01

## 2023-06-01 VITALS
WEIGHT: 231 LBS | SYSTOLIC BLOOD PRESSURE: 140 MMHG | HEART RATE: 70 BPM | BODY MASS INDEX: 30.62 KG/M2 | DIASTOLIC BLOOD PRESSURE: 70 MMHG | HEIGHT: 73 IN

## 2023-06-01 DIAGNOSIS — S69.91XA HAND INJURY, RIGHT, INITIAL ENCOUNTER: ICD-10-CM

## 2023-06-01 DIAGNOSIS — S60.229A CONTUSION OF DORSUM OF HAND: Primary | ICD-10-CM

## 2023-06-01 NOTE — PROGRESS NOTES
Assessment/Plan:  1  Contusion of dorsum of hand  Brace      2  Hand injury, right, initial encounter  Ambulatory Referral to Orthopedic Surgery          Scribe Attestation    I,:  Pancho Briggs Call am acting as a scribe while in the presence of the attending physician :       I,:  Nesha Johnson, DO personally performed the services described in this documentation    as scribed in my presence :             I reviewed Emilio's x-rays carefully  I agree with radiology  There is no evidence of acute fracture or other osseous abnormality  The thumb has normal range of motion and is stable to my examination  I explained to the patient that he has likely suffered a bone contusion to the hand  This can take up to 6 weeks to fully heal   I did provide him a thumboprene brace to help protect the region while at work  He was agreeable to this  He had no further questions  If he shows a worsening of symptoms or his condition does not resolve over the next 3 weeks he can return to see me  Subjective:   Marky Luevano is a 60 y o  male who presents to the office today for evaluation of his right thumb  Approximately 3 and half weeks ago he was struck in the right hand region while at work by a piece of equipment  He did receive x-rays in urgent care shortly following his injury which were negative for fracture  Loida Oakes visits today because he is continuing to experience pain in the right first metacarpal region  He states if he bumps his hand against an object it feels as though he injured it all over again  He states he has been able to continue with work with little limitations other than if he bumps his hand against an object  Today he has no pain with rest   He denies distal paresthesias  Review of Systems   Constitutional: Negative for chills, fever and unexpected weight change  HENT: Negative for hearing loss, nosebleeds and sore throat  Eyes: Negative for pain, redness and visual disturbance  Respiratory: Negative for cough, shortness of breath and wheezing  Cardiovascular: Negative for chest pain, palpitations and leg swelling  Gastrointestinal: Negative for abdominal pain, nausea and vomiting  Endocrine: Negative for polyphagia and polyuria  Genitourinary: Negative for dysuria and hematuria  Musculoskeletal:        See HPI   Skin: Negative for rash and wound  Neurological: Negative for dizziness, numbness and headaches  Psychiatric/Behavioral: Negative for decreased concentration and suicidal ideas  The patient is not nervous/anxious  Past Medical History:   Diagnosis Date   • Benign essential hypertension     LAST ASSESSED 5/1/17; RESOLVED 5/1/17   • Chronic pain disorder    • Controlled insulin dependent diabetes mellitus     LAST ASSESSED 4/1/15; RESOLVED 5/2/16   • Diabetes mellitus ([facility] [address] ) Long time ago   • Hyperlipidemia    • Low back pain    • Neck pain    • Osteoarthritis    • Pericarditis    • [facility]        Past Surgical History:   Procedure Laterality Date   • JOINT REPLACEMENT  10 years ago   • KNEE SURGERY     • NERVE BLOCK Bilateral 07/19/2019    Procedure: C3 C4 C5 C6 Medial Branch Block #1 ([id number]); Surgeon: Luci Wilkins MD;  Location: [facility] MAIN OR;  Service: Pain Management    • NERVE BLOCK Bilateral 08/02/2019    Procedure: C3 C4 C5 C6 Medial Branch Block #2 ([id number]);   Surgeon: Luci Wilkins MD;  Location: [facility] MAIN OR;  Service: Pain Management    • RADIOFREQUENCY ABLATION Right 08/16/2019    Procedure: C3 C4 C5 C6 RADIOFREQUENCY ABLATION;  Surgeon: Luci Wilkins MD;  Location: [facility] MAIN OR;  Service: Pain Management    • RADIOFREQUENCY ABLATION Left 08/30/2019    Procedure: C3 C4 C5 C6 RADIOFREQUENCY ABLATION;  Surgeon: Luci Wilkins MD;  Location: [facility] MAIN OR;  Service: Pain Management    • REPLACEMENT TOTAL KNEE Right    • WRIST SURGERY         Family History   Problem Relation Age of Onset   • Cancer Family    • Cancer Mother    • Cancer Father    • No Known Problems Sister    • Cancer Brother    • No Known Problems Maternal Aunt    • No Known Problems Maternal Uncle    • No Known Problems Paternal Aunt    • No Known Problems Paternal Uncle    • No Known Problems Maternal Grandmother    • No Known Problems Maternal Grandfather    • No Known Problems Paternal Grandmother    • No Known Problems Paternal Grandfather    • Diabetes Brother        Social History     Occupational History   • Not on file   Tobacco Use   • Smoking status: Never   • Smokeless tobacco: Never   Vaping Use   • Vaping Use: Never used   Substance and Sexual Activity   • Alcohol use: No   • Drug use: Never   • Sexual activity: Not Currently     Partners: Female         Current Outpatient Medications:   •  glimepiride (AMARYL) 2 mg tablet, Take 1 tablet (2 mg total) by mouth daily with breakfast, Disp: 90 tablet, Rfl: 3  •  lisinopril (ZESTRIL) 10 mg tablet, TAKE ONE TABLET BY MOUTH EVERY DAY, Disp: 90 tablet, Rfl: 0  •  metFORMIN (GLUCOPHAGE-XR) 500 mg 24 hr tablet, Take 4 tablets (2,000 mg total) by mouth daily with breakfast, Disp: 360 tablet, Rfl: 3  •  rosuvastatin (CRESTOR) 5 mg tablet, Take 1 tablet (5 mg total) by mouth daily, Disp: 90 tablet, Rfl: 3    No Known Allergies    Objective:  Vitals:    06/01/23 1110   BP: 140/70   Pulse: 70       Right Hand Exam     Tenderness   The patient is experiencing tenderness in the dorsal area (First metacarpal)  Range of Motion   The patient has normal right wrist ROM  Hand   MP Thumb: normal   DIP Thumb: normal     Muscle Strength   Right wrist normal muscle strength: 5/5 strength in the EPL, APB and FPL  : 5/5     Other   Erythema: absent  Scars: absent  Sensation: normal          Strength/Myotome Testing     Right Wrist/Hand   Right wrist normal muscle strength: 5/5 strength in the EPL, APB and FPL  Physical Exam  Vitals reviewed     Constitutional:       Appearance: He is well-developed  HENT:      Head: Normocephalic and atraumatic  Eyes:      General:         Right eye: No discharge  Left eye: No discharge  Conjunctiva/sclera: Conjunctivae normal    Cardiovascular:      Rate and Rhythm: Regular rhythm  Pulmonary:      Effort: Pulmonary effort is normal  No respiratory distress  Musculoskeletal:      Cervical back: Normal range of motion and neck supple  Comments: As noted in the HPI   Skin:     General: Skin is warm and dry  Neurological:      Mental Status: He is alert and oriented to person, place, and time  Psychiatric:         Behavior: Behavior normal          I have personally reviewed pertinent films in PACS and my interpretation is as follows:  X-rays of the right hand taken at care now and ordered by Radha Tamez were reviewed and found to be normal   There is no evidence of acute fracture or other osseous abnormality

## 2023-06-24 DIAGNOSIS — E11.9 TYPE 2 DIABETES MELLITUS WITHOUT COMPLICATION, WITHOUT LONG-TERM CURRENT USE OF INSULIN (HCC): ICD-10-CM

## 2023-06-24 RX ORDER — LISINOPRIL 10 MG/1
TABLET ORAL
Qty: 90 TABLET | Refills: 0 | Status: SHIPPED | OUTPATIENT
Start: 2023-06-24

## 2023-12-07 ENCOUNTER — OFFICE VISIT (OUTPATIENT)
Dept: URGENT CARE | Facility: CLINIC | Age: 63
End: 2023-12-07
Payer: COMMERCIAL

## 2023-12-07 VITALS
SYSTOLIC BLOOD PRESSURE: 148 MMHG | RESPIRATION RATE: 14 BRPM | TEMPERATURE: 97.6 F | OXYGEN SATURATION: 97 % | BODY MASS INDEX: 29.03 KG/M2 | WEIGHT: 220 LBS | DIASTOLIC BLOOD PRESSURE: 88 MMHG | HEART RATE: 67 BPM

## 2023-12-07 DIAGNOSIS — M54.50 ACUTE MIDLINE LOW BACK PAIN WITHOUT SCIATICA: Primary | ICD-10-CM

## 2023-12-07 PROCEDURE — 99203 OFFICE O/P NEW LOW 30 MIN: CPT | Performed by: PHYSICIAN ASSISTANT

## 2023-12-07 RX ORDER — METHOCARBAMOL 500 MG/1
1000 TABLET, FILM COATED ORAL 3 TIMES DAILY
Qty: 30 TABLET | Refills: 0 | Status: SHIPPED | OUTPATIENT
Start: 2023-12-07

## 2023-12-07 RX ORDER — METHOCARBAMOL 750 MG/1
TABLET, FILM COATED ORAL
COMMUNITY

## 2023-12-07 NOTE — LETTER
December 7, 2023     Patient: Vanessa Mace YOB: 1960   Date of Visit: 12/7/2023       To Whom it May Concern:    Marlyn Musa was seen in my clinic on 12/7/2023. He may return to work on 12/9/2023 . If you have any questions or concerns, please don't hesitate to call.          Sincerely,          Tamar Collins PA-C        CC: No Recipients

## 2023-12-07 NOTE — PROGRESS NOTES
[facility]        NAME: Sulema Luna is a 58 y.o. male  : 1960    MRN: [id number]  DATE: 2023  TIME: 1:42 PM    Assessment and Plan   Acute midline low back pain without sciatica [M54.50]  1. Acute midline low back pain without sciatica  methocarbamol (ROBAXIN) 500 mg tablet        Discussed possible s/es of medications including drowsiness and advised never to take before working or driving. Can continue NSAIDs/tylenol, heating pad, slow walking, and gentle stretching. Discussed strict return to care precautions as well as red flag symptoms which should prompt immediate ED referral. Pt verbalized understanding and is in agreement with plan. Please follow up with your primary care provider within the next week. Please remember that your visit today was with an urgent care provider and should not replace follow up with your primary care provider for chronic medical issues or annual physicals. Patient Instructions       Follow up with PCP in 3-5 days. Proceed to  ER if symptoms worsen. Chief Complaint     Chief Complaint   Patient presents with    Back Pain     Pt presents with lower back pain that started on Saturday r/t bending over to  wire         History of Present Illness       Pt is a 59 yo male with pmh DM presenting with low back pain x 3 days. Bent over to grab something and felt something pull in his back. Has been using advil and tylenol with minimal relief. No numbness/tingling of extremities or groin, saddle anesthesia, loss of bowel or bladder function, dysuria, hematuria, or URI symptoms. States he has not been to his pcp in >1 yr and does not take any of his diabetes medications because he is taking care of multiple sick family members. Review of Systems   Review of Systems   Constitutional:  Negative for chills and fever. Respiratory:  Negative for shortness of breath. Cardiovascular:  Negative for chest pain.    Gastrointestinal: Negative for nausea and vomiting. Genitourinary:  Negative for dysuria, flank pain and hematuria. Musculoskeletal:  Positive for back pain. Negative for arthralgias, gait problem, joint swelling, myalgias and neck pain. Skin:  Negative for color change and wound. Neurological:  Negative for weakness and numbness. Current Medications       Current Outpatient Medications:     methocarbamol (ROBAXIN) 500 mg tablet, Take 2 tablets (1,000 mg total) by mouth 3 (three) times a day, Disp: 30 tablet, Rfl: 0    methocarbamol (ROBAXIN) 750 mg tablet, Take 1 tablet every 6 hours by oral route., Disp: , Rfl:     Current Allergies     Allergies as of 12/07/2023    (No Known Allergies)            The following portions of the patient's history were reviewed and updated as appropriate: allergies, current medications, past family history, past medical history, past social history, past surgical history and problem list.     Past Medical History:   Diagnosis Date    Benign essential hypertension     LAST ASSESSED 5/1/17; RESOLVED 5/1/17    Chronic pain disorder     Controlled insulin dependent diabetes mellitus     LAST ASSESSED 4/1/15; RESOLVED 5/2/16    Diabetes mellitus ([address]) Long time ago    Hyperlipidemia     Low back pain     Neck pain     Osteoarthritis     Pericarditis     Type 2 diabetes mellitus ([address])        Past Surgical History:   Procedure Laterality Date    JOINT REPLACEMENT  10 years ago    KNEE SURGERY      NERVE BLOCK Bilateral 07/19/2019    Procedure: C3 C4 C5 C6 Medial Branch Block #1 ([id number]); Surgeon: Cal Watson MD;  Location: [facility] MAIN OR;  Service: Pain Management     NERVE BLOCK Bilateral 08/02/2019    Procedure: C3 C4 C5 C6 Medial Branch Block #2 ([id number]);   Surgeon: Cal Watson MD;  Location: [facility] MAIN OR;  Service: Pain Management     RADIOFREQUENCY ABLATION Right 08/16/2019    Procedure: C3 C4 C5 C6 RADIOFREQUENCY ABLATION;  Surgeon: Cal Watson MD; Location: [facility] MAIN OR;  Service: Pain Management     RADIOFREQUENCY ABLATION Left 08/30/2019    Procedure: C3 C4 C5 C6 RADIOFREQUENCY ABLATION;  Surgeon: Surjit Lee MD;  Location: [facility] MAIN OR;  Service: Pain Management     REPLACEMENT TOTAL KNEE Right     WRIST SURGERY         Family History   Problem Relation Age of Onset    Cancer Family     Cancer Mother     Cancer Father     No Known Problems Sister     Cancer Brother     No Known Problems Maternal Aunt     No Known Problems Maternal Uncle     No Known Problems Paternal Aunt     No Known Problems Paternal Uncle     No Known Problems Maternal Grandmother     No Known Problems Maternal Grandfather     No Known Problems Paternal Grandmother     No Known Problems Paternal Grandfather     Diabetes Brother          Medications have been verified. Objective   /88   Pulse 67   Temp 97.6 °F (36.4 °C)   Resp 14   Wt 99.8 kg (220 lb)   SpO2 97%   BMI 29.03 kg/m²        Physical Exam     Physical Exam  Vitals and nursing note reviewed. Constitutional:       General: He is not in acute distress. Appearance: Normal appearance. He is not ill-appearing, toxic-appearing or diaphoretic. HENT:      Head: Normocephalic and atraumatic. Eyes:      Conjunctiva/sclera: Conjunctivae normal.      Pupils: Pupils are equal, round, and reactive to light. Cardiovascular:      Rate and Rhythm: Normal rate and regular rhythm. Heart sounds: Normal heart sounds. Pulmonary:      Effort: Pulmonary effort is normal. No respiratory distress. Breath sounds: Normal breath sounds. No wheezing, rhonchi or rales. Abdominal:      General: Abdomen is flat. Palpations: Abdomen is soft. Tenderness: There is no right CVA tenderness or left CVA tenderness. Musculoskeletal:      Cervical back: Normal, normal range of motion and neck supple. Thoracic back: Normal.      Lumbar back: Tenderness present.  No swelling, edema, spasms or bony tenderness. Decreased range of motion. Negative right straight leg raise test and negative left straight leg raise test.   Skin:     General: Skin is warm and dry. Capillary Refill: Capillary refill takes less than 2 seconds. Neurological:      General: No focal deficit present. Mental Status: He is alert and oriented to person, place, and time. Sensory: No sensory deficit. Motor: No weakness.       Coordination: Coordination normal.      Deep Tendon Reflexes: Reflexes normal.   Psychiatric:         Behavior: Behavior normal.

## 2024-02-21 PROBLEM — Z12.11 COLON CANCER SCREENING: Status: RESOLVED | Noted: 2019-05-15 | Resolved: 2024-02-21

## 2024-03-12 ENCOUNTER — TELEPHONE (OUTPATIENT)
Age: 64
End: 2024-03-12

## 2024-03-12 NOTE — TELEPHONE ENCOUNTER
Pt is scheduled to come in for 4/18/2024 . Pt would like to get in for a sooner appointment if anything is available he prefers a Tuesday or Wednesday early morning . Pt has body pain all over and feels out of it would like to get himself a checkup. Please f/u . Thank you.

## 2024-03-22 ENCOUNTER — APPOINTMENT (EMERGENCY)
Dept: RADIOLOGY | Facility: HOSPITAL | Age: 64
End: 2024-03-22
Payer: COMMERCIAL

## 2024-03-22 ENCOUNTER — HOSPITAL ENCOUNTER (EMERGENCY)
Facility: HOSPITAL | Age: 64
Discharge: HOME | End: 2024-03-22
Attending: EMERGENCY MEDICINE
Payer: COMMERCIAL

## 2024-03-22 VITALS
RESPIRATION RATE: 16 BRPM | TEMPERATURE: 99.7 F | HEART RATE: 49 BPM | OXYGEN SATURATION: 96 % | SYSTOLIC BLOOD PRESSURE: 145 MMHG | DIASTOLIC BLOOD PRESSURE: 75 MMHG

## 2024-03-22 DIAGNOSIS — E83.42 HYPOMAGNESEMIA: ICD-10-CM

## 2024-03-22 DIAGNOSIS — R51.9 HEADACHE: ICD-10-CM

## 2024-03-22 DIAGNOSIS — R20.2 PARESTHESIAS: ICD-10-CM

## 2024-03-22 DIAGNOSIS — R47.01 APHASIA: ICD-10-CM

## 2024-03-22 DIAGNOSIS — G45.9 TIA (TRANSIENT ISCHEMIC ATTACK): Primary | ICD-10-CM

## 2024-03-22 LAB
ALBUMIN SERPL BCP-MCNC: 4.1 G/DL (ref 3.5–5)
ALP SERPL-CCNC: 90 U/L (ref 34–104)
ALT SERPL W P-5'-P-CCNC: 9 U/L (ref 7–52)
ANION GAP SERPL CALCULATED.3IONS-SCNC: 7 MMOL/L (ref 4–13)
APTT PPP: 27 SECONDS (ref 23–37)
AST SERPL W P-5'-P-CCNC: 11 U/L (ref 13–39)
BASOPHILS # BLD AUTO: 0.03 THOUSANDS/ÂΜL (ref 0–0.1)
BASOPHILS NFR BLD AUTO: 1 % (ref 0–1)
BILIRUB SERPL-MCNC: 0.66 MG/DL (ref 0.2–1)
BUN SERPL-MCNC: 9 MG/DL (ref 5–25)
CALCIUM SERPL-MCNC: 9.5 MG/DL (ref 8.4–10.2)
CARDIAC TROPONIN I PNL SERPL HS: 3 NG/L
CHLORIDE SERPL-SCNC: 101 MMOL/L (ref 96–108)
CO2 SERPL-SCNC: 26 MMOL/L (ref 21–32)
CREAT SERPL-MCNC: 0.81 MG/DL (ref 0.6–1.3)
EOSINOPHIL # BLD AUTO: 0.04 THOUSAND/ÂΜL (ref 0–0.61)
EOSINOPHIL NFR BLD AUTO: 1 % (ref 0–6)
ERYTHROCYTE [DISTWIDTH] IN BLOOD BY AUTOMATED COUNT: 12.4 % (ref 11.6–15.1)
GFR SERPL CREATININE-BSD FRML MDRD: 93 ML/MIN/1.73SQ M
GLUCOSE SERPL-MCNC: 183 MG/DL (ref 65–140)
HCT VFR BLD AUTO: 41.8 % (ref 36.5–49.3)
HGB BLD-MCNC: 14.1 G/DL (ref 12–17)
IMM GRANULOCYTES # BLD AUTO: 0.02 THOUSAND/UL (ref 0–0.2)
IMM GRANULOCYTES NFR BLD AUTO: 0 % (ref 0–2)
INR PPP: 1.06 (ref 0.84–1.19)
LYMPHOCYTES # BLD AUTO: 1.4 THOUSANDS/ÂΜL (ref 0.6–4.47)
LYMPHOCYTES NFR BLD AUTO: 23 % (ref 14–44)
MAGNESIUM SERPL-MCNC: 1.7 MG/DL (ref 1.9–2.7)
MCH RBC QN AUTO: 30.7 PG (ref 26.8–34.3)
MCHC RBC AUTO-ENTMCNC: 33.7 G/DL (ref 31.4–37.4)
MCV RBC AUTO: 91 FL (ref 82–98)
MONOCYTES # BLD AUTO: 0.42 THOUSAND/ÂΜL (ref 0.17–1.22)
MONOCYTES NFR BLD AUTO: 7 % (ref 4–12)
NEUTROPHILS # BLD AUTO: 4.1 THOUSANDS/ÂΜL (ref 1.85–7.62)
NEUTS SEG NFR BLD AUTO: 68 % (ref 43–75)
NRBC BLD AUTO-RTO: 0 /100 WBCS
PLATELET # BLD AUTO: 164 THOUSANDS/UL (ref 149–390)
PMV BLD AUTO: 10.6 FL (ref 8.9–12.7)
POTASSIUM SERPL-SCNC: 3.8 MMOL/L (ref 3.5–5.3)
PROT SERPL-MCNC: 6.4 G/DL (ref 6.4–8.4)
PROTHROMBIN TIME: 14 SECONDS (ref 11.6–14.5)
RBC # BLD AUTO: 4.6 MILLION/UL (ref 3.88–5.62)
SODIUM SERPL-SCNC: 134 MMOL/L (ref 135–147)
WBC # BLD AUTO: 6.01 THOUSAND/UL (ref 4.31–10.16)

## 2024-03-22 PROCEDURE — 96375 TX/PRO/DX INJ NEW DRUG ADDON: CPT

## 2024-03-22 PROCEDURE — 85610 PROTHROMBIN TIME: CPT | Performed by: EMERGENCY MEDICINE

## 2024-03-22 PROCEDURE — 93005 ELECTROCARDIOGRAM TRACING: CPT

## 2024-03-22 PROCEDURE — 96374 THER/PROPH/DIAG INJ IV PUSH: CPT

## 2024-03-22 PROCEDURE — 70498 CT ANGIOGRAPHY NECK: CPT

## 2024-03-22 PROCEDURE — 85025 COMPLETE CBC W/AUTO DIFF WBC: CPT | Performed by: EMERGENCY MEDICINE

## 2024-03-22 PROCEDURE — 99285 EMERGENCY DEPT VISIT HI MDM: CPT

## 2024-03-22 PROCEDURE — 83735 ASSAY OF MAGNESIUM: CPT | Performed by: EMERGENCY MEDICINE

## 2024-03-22 PROCEDURE — 70496 CT ANGIOGRAPHY HEAD: CPT

## 2024-03-22 PROCEDURE — 80053 COMPREHEN METABOLIC PANEL: CPT | Performed by: EMERGENCY MEDICINE

## 2024-03-22 PROCEDURE — 85730 THROMBOPLASTIN TIME PARTIAL: CPT | Performed by: EMERGENCY MEDICINE

## 2024-03-22 PROCEDURE — 36415 COLL VENOUS BLD VENIPUNCTURE: CPT | Performed by: EMERGENCY MEDICINE

## 2024-03-22 PROCEDURE — 84484 ASSAY OF TROPONIN QUANT: CPT | Performed by: EMERGENCY MEDICINE

## 2024-03-22 PROCEDURE — 99285 EMERGENCY DEPT VISIT HI MDM: CPT | Performed by: EMERGENCY MEDICINE

## 2024-03-22 RX ORDER — ASPIRIN 325 MG
325 TABLET ORAL DAILY
Qty: 30 TABLET | Refills: 0 | Status: SHIPPED | OUTPATIENT
Start: 2024-03-22 | End: 2024-03-28

## 2024-03-22 RX ORDER — DEXAMETHASONE SODIUM PHOSPHATE 4 MG/ML
10 INJECTION, SOLUTION INTRA-ARTICULAR; INTRALESIONAL; INTRAMUSCULAR; INTRAVENOUS; SOFT TISSUE ONCE
Status: COMPLETED | OUTPATIENT
Start: 2024-03-22 | End: 2024-03-22

## 2024-03-22 RX ORDER — KETOROLAC TROMETHAMINE 30 MG/ML
15 INJECTION, SOLUTION INTRAMUSCULAR; INTRAVENOUS ONCE
Status: COMPLETED | OUTPATIENT
Start: 2024-03-22 | End: 2024-03-22

## 2024-03-22 RX ORDER — LANOLIN ALCOHOL/MO/W.PET/CERES
800 CREAM (GRAM) TOPICAL ONCE
Status: COMPLETED | OUTPATIENT
Start: 2024-03-22 | End: 2024-03-22

## 2024-03-22 RX ADMIN — KETOROLAC TROMETHAMINE 15 MG: 30 INJECTION, SOLUTION INTRAMUSCULAR; INTRAVENOUS at 17:12

## 2024-03-22 RX ADMIN — IOHEXOL 85 ML: 350 INJECTION, SOLUTION INTRAVENOUS at 15:24

## 2024-03-22 RX ADMIN — Medication 800 MG: at 17:10

## 2024-03-22 RX ADMIN — DEXAMETHASONE SODIUM PHOSPHATE 10 MG: 4 INJECTION INTRA-ARTICULAR; INTRALESIONAL; INTRAMUSCULAR; INTRAVENOUS; SOFT TISSUE at 17:12

## 2024-03-22 NOTE — ED NOTES
D/w pt AMA status.  Pt continues to verbalize wish to sign AMA       Johanne Mejia RN  03/22/24 [id number]

## 2024-03-22 NOTE — ED PROVIDER NOTES
History  Chief Complaint   Patient presents with    Numbness     Hx of chronic back pain and intermittent numbness to R arm. Yesterday became concerned in the am as he had more severe numbness to R hand. Had 5 min where he felt like he was having trouble speaking, having to take care of disabled wife and very stressed. Hx of injections in cervical spine     63-year-old male with a history of chronic pain, hyperlipidemia, osteoarthritis, type 2 diabetes-noncompliant with medication presents emergency department with complaint of intermittent paresthesias to right upper extremity over the past few months.  Patient states that during an episode yesterday, he also had a minute of aphasia.  Patient was not evaluated for the symptoms, which resolved spontaneously.  He denies other focal neurological deficits.      History provided by:  Patient   used: No        Prior to Admission Medications   Prescriptions Last Dose Informant Patient Reported? Taking?   methocarbamol (ROBAXIN) 500 mg tablet Not Taking  No No   Sig: Take 2 tablets (1,000 mg total) by mouth 3 (three) times a day   Patient not taking: Reported on 3/22/2024   methocarbamol (ROBAXIN) 750 mg tablet Not Taking  Yes No   Sig: Take 1 tablet every 6 hours by oral route.   Patient not taking: Reported on 3/22/2024      Facility-Administered Medications: None       Past Medical History:   Diagnosis Date    Benign essential hypertension     LAST ASSESSED 5/1/17; RESOLVED 5/1/17    Chronic pain disorder     Controlled insulin dependent diabetes mellitus     LAST ASSESSED 4/1/15; RESOLVED 5/2/16    Diabetes mellitus (HCC) Long time ago    Hyperlipidemia     Low back pain     Neck pain     Osteoarthritis     Pericarditis     Type 2 diabetes mellitus (HCC)        Past Surgical History:   Procedure Laterality Date    JOINT REPLACEMENT  10 years ago    KNEE SURGERY      NERVE BLOCK Bilateral 07/19/2019    Procedure: C3 C4 C5 C6 Medial Branch Block #1  ([id number]);  Surgeon: Demario Ordaz MD;  Location: [facility] MAIN OR;  Service: Pain Management     NERVE BLOCK Bilateral 08/02/2019    Procedure: C3 C4 C5 C6 Medial Branch Block #2 ([id number]);  Surgeon: Demario Ordaz MD;  Location: [facility] MAIN OR;  Service: Pain Management     RADIOFREQUENCY ABLATION Right 08/16/2019    Procedure: C3 C4 C5 C6 RADIOFREQUENCY ABLATION;  Surgeon: Demario Ordaz MD;  Location: [facility] MAIN OR;  Service: Pain Management     RADIOFREQUENCY ABLATION Left 08/30/2019    Procedure: C3 C4 C5 C6 RADIOFREQUENCY ABLATION;  Surgeon: Demario Ordaz MD;  Location: [facility] MAIN OR;  Service: Pain Management     REPLACEMENT TOTAL KNEE Right     WRIST SURGERY         Family History   Problem Relation Age of Onset    Cancer Family     Cancer Mother     Cancer Father     No Known Problems Sister     Cancer Brother     No Known Problems Maternal Aunt     No Known Problems Maternal Uncle     No Known Problems Paternal Aunt     No Known Problems Paternal Uncle     No Known Problems Maternal Grandmother     No Known Problems Maternal Grandfather     No Known Problems Paternal Grandmother     No Known Problems Paternal Grandfather     Diabetes Brother      I have reviewed and agree with the history as documented.    E-Cigarette/Vaping    E-Cigarette Use Never User      E-Cigarette/Vaping Substances    Nicotine No     THC No     CBD No     Flavoring No     Other No     Unknown No      Social History     Tobacco Use    Smoking status: Never     Passive exposure: Past    Smokeless tobacco: Never   Vaping Use    Vaping status: Never Used   Substance Use Topics    Alcohol use: No    Drug use: Never       Review of Systems   Constitutional:  Negative for chills and fever.   Respiratory:  Negative for cough, shortness of breath and wheezing.    Cardiovascular:  Negative for chest pain and palpitations.   Gastrointestinal:  Negative for abdominal pain, constipation, diarrhea, nausea and vomiting.    Genitourinary:  Negative for dysuria, flank pain, hematuria and urgency.   Musculoskeletal:  Negative for back pain.   Skin:  Negative for color change and rash.   All other systems reviewed and are negative.      Physical Exam  Physical Exam  Vitals and nursing note reviewed.   Constitutional:       Appearance: He is well-developed.   HENT:      Head: Normocephalic and atraumatic.   Eyes:      Extraocular Movements: Extraocular movements intact.      Pupils: Pupils are equal, round, and reactive to light.   Cardiovascular:      Rate and Rhythm: Normal rate and regular rhythm.      Heart sounds: Normal heart sounds.   Pulmonary:      Effort: Pulmonary effort is normal.      Breath sounds: Normal breath sounds.   Abdominal:      General: Bowel sounds are normal. There is no distension.      Palpations: Abdomen is soft. There is no mass.      Tenderness: There is no abdominal tenderness. There is no guarding or rebound.   Musculoskeletal:      Cervical back: Normal range of motion and neck supple.   Skin:     General: Skin is warm and dry.      Capillary Refill: Capillary refill takes less than 2 seconds.   Neurological:      General: No focal deficit present.      Mental Status: He is alert and oriented to person, place, and time.   Psychiatric:         Behavior: Behavior normal.         Thought Content: Thought content normal.         Judgment: Judgment normal.         Vital Signs  ED Triage Vitals [03/22/24 1321]   Temperature Pulse Respirations Blood Pressure SpO2   99.7 °F (37.6 °C) 62 20 (!) 179/88 98 %      Temp Source Heart Rate Source Patient Position - Orthostatic VS BP Location FiO2 (%)   Tympanic Monitor Sitting Left arm --      Pain Score       5           Vitals:    03/22/24 1321 03/22/24 1500 03/22/24 1557   BP: (!) 179/88  145/75   Pulse: 62  (!) 49   Patient Position - Orthostatic VS: Sitting Lying Lying         Visual Acuity  Visual Acuity      Flowsheet Row Most Recent Value   L Pupil Size (mm) 3    R Pupil Size (mm) 3            ED Medications  Medications   iohexol (OMNIPAQUE) 350 MG/ML injection (MULTI-DOSE) 85 mL (85 mL Intravenous Given 3/22/24 1524)   dexamethasone (DECADRON) injection 10 mg (10 mg Intravenous Given 3/22/24 1712)   ketorolac (TORADOL) injection 15 mg (15 mg Intravenous Given 3/22/24 1712)   magnesium Oxide (MAG-OX) tablet 800 mg (800 mg Oral Given 3/22/24 1710)       Diagnostic Studies  Results Reviewed       Procedure Component Value Units Date/Time    HS Troponin 0hr (reflex protocol) [[id number]]  (Normal) Collected: 03/22/24 1432    Lab Status: Final result Specimen: Blood from Arm, Left Updated: 03/22/24 1515     hs TnI 0hr 3 ng/L     Comprehensive metabolic panel [[id number]]  (Abnormal) Collected: 03/22/24 1432    Lab Status: Final result Specimen: Blood from Arm, Left Updated: 03/22/24 1510     Sodium 134 mmol/L      Potassium 3.8 mmol/L      Chloride 101 mmol/L      CO2 26 mmol/L      ANION GAP 7 mmol/L      BUN 9 mg/dL      Creatinine 0.81 mg/dL      Glucose 183 mg/dL      Calcium 9.5 mg/dL      AST 11 U/L      ALT 9 U/L      Alkaline Phosphatase 90 U/L      Total Protein 6.4 g/dL      Albumin 4.1 g/dL      Total Bilirubin 0.66 mg/dL      eGFR 93 ml/min/1.73sq m     Narrative:      National Kidney Disease Foundation guidelines for Chronic Kidney Disease (CKD):     Stage 1 with normal or high GFR (GFR > 90 mL/min/1.73 square meters)    Stage 2 Mild CKD (GFR = 60-89 mL/min/1.73 square meters)    Stage 3A Moderate CKD (GFR = 45-59 mL/min/1.73 square meters)    Stage 3B Moderate CKD (GFR = 30-44 mL/min/1.73 square meters)    Stage 4 Severe CKD (GFR = 15-29 mL/min/1.73 square meters)    Stage 5 End Stage CKD (GFR <15 mL/min/1.73 square meters)  Note: GFR calculation is accurate only with a steady state creatinine    Magnesium [[id number]]  (Abnormal) Collected: 03/22/24 1432    Lab Status: Final result Specimen: Blood from Arm, Left Updated: 03/22/24 1510     Magnesium 1.7 mg/dL      Protime-INR [[id number]]  (Normal) Collected: 03/22/24 1432    Lab Status: Final result Specimen: Blood from Arm, Left Updated: 03/22/24 1454     Protime 14.0 seconds      INR 1.06    APTT [[id number]]  (Normal) Collected: 03/22/24 1432    Lab Status: Final result Specimen: Blood from Arm, Left Updated: 03/22/24 1454     PTT 27 seconds     CBC and differential [[id number]] Collected: 03/22/24 1432    Lab Status: Final result Specimen: Blood from Arm, Left Updated: 03/22/24 1440     WBC 6.01 Thousand/uL      RBC 4.60 Million/uL      Hemoglobin 14.1 g/dL      Hematocrit 41.8 %      MCV 91 fL      MCH 30.7 pg      MCHC 33.7 g/dL      RDW 12.4 %      MPV 10.6 fL      Platelets 164 Thousands/uL      nRBC 0 /100 WBCs      Neutrophils Relative 68 %      Immature Grans % 0 %      Lymphocytes Relative 23 %      Monocytes Relative 7 %      Eosinophils Relative 1 %      Basophils Relative 1 %      Neutrophils Absolute 4.10 Thousands/µL      Absolute Immature Grans 0.02 Thousand/uL      Absolute Lymphocytes 1.40 Thousands/µL      Absolute Monocytes 0.42 Thousand/µL      Eosinophils Absolute 0.04 Thousand/µL      Basophils Absolute 0.03 Thousands/µL                    CTA head and neck with and without contrast   Final Result by Jcarlos Cohn DO (03/22 1620)      Chronic lacunar infarct left thalamus. No acute intracranial abnormality.      No significant carotid or vertebral artery stenosis. No focal intracranial stenosis or aneurysm.                  Workstation performed: [id number]                    Procedures  ECG 12 Lead Documentation Only    Date/Time: 3/22/2024 4:07 PM    Performed by: Feliz Toro DO  Authorized by: Feliz Toro DO    Indications / Diagnosis:  Aphasia  ECG reviewed by me, the ED Provider: yes    Patient location:  ED  Previous ECG:     Previous ECG:  Unavailable    Comparison to cardiac monitor: Yes    Interpretation:     Interpretation: abnormal    Rate:     ECG rate:  47bpm     ECG rate assessment: bradycardic    Rhythm:     Rhythm: sinus bradycardia    Ectopy:     Ectopy: none    QRS:     QRS axis:  Normal  Conduction:     Conduction: normal    ST segments:     ST segments:  Normal  T waves:     T waves: normal             ED Course                  Stroke Assessment       Row Name 03/22/24 1400             NIH Stroke Scale    Interval Baseline      Level of Consciousness (1a.) 0      LOC Questions (1b.) 0      LOC Commands (1c.) 0      Best Gaze (2.) 0      Visual (3.) 0      Facial Palsy (4.) 0      Motor Arm, Left (5a.) 0      Motor Arm, Right (5b.) 0      Motor Leg, Left (6a.) 0      Motor Leg, Right (6b.) 0      Limb Ataxia (7.) 0      Sensory (8.) 0      Best Language (9.) 0      Dysarthria (10.) 0      Extinction and Inattention (11.) (Formerly Neglect) 0      Total 0                    Flowsheet Row Most Recent Value   Thrombolytic Decision Options    Thrombolytic Decision Patient not a candidate.   Patient is not a candidate options Symptoms resolved/clearly non disabling.                                    Medical Decision Making  63-year-old male in the ED for evaluation of upper extremity paresthesias and transient aphasia.  Paresthesias are likely due to chronic spinal stenosis, however aphasia is concerning for a TIA.  Labs, CTA head and neck ordered and reviewed.  No acute pathology identified.  I made a recommendation for admission, however patient declined, stating that he has to take care of his wife who had a recent CVA.  Patient will be discharged AGAINST MEDICAL ADVICE, with recommendation for outpatient follow-up with neurology.  Patient also advised to start taking an aspirin daily.  Patient complained of a mild headache at the time of reevaluation, treated with migraine cocktail with resolution.  Return precautions reviewed with patient and his wife.    Amount and/or Complexity of Data Reviewed  Labs: ordered.  Radiology: ordered.    Risk  OTC drugs.  Prescription  drug management.             Disposition  Final diagnoses:   TIA (transient ischemic attack)   Aphasia   Paresthesias   Headache   Hypomagnesemia     Time reflects when diagnosis was documented in both MDM as applicable and the Disposition within this note       Time User Action Codes Description Comment    3/22/2024  4:39 PM Oyesanmi, Olubusola O Add [G45.9] TIA (transient ischemic attack)     3/22/2024  4:39 PM Oyesanmi, Olubusola O Add [R47.01] Aphasia     3/22/2024  4:39 PM Oyesanmi, Olubusola O Add [R20.2] Paresthesias     3/22/2024  4:39 PM Oyesanmi, Olubusola O Add [R51.9] Headache     3/22/2024  4:46 PM Oyesanmi, Olubusola O Add [E83.42] Hypomagnesemia           ED Disposition       ED Disposition   Discharge    Condition   Stable    Date/Time   Fri Mar 22, 2024  5:18 PM    Comment   Date: 3/22/2024  Patient: Jose Manuel MAYA Mehran Guadalupe.  Admitted: 3/22/2024  1:54 PM  Attending Provider: DO Jose Manuel Sanchez Sr. or his authorized caregiver has made the decision for the patient to leave the emergency department a gainst the advice of the emergency department staff. He or his authorized caregiver has been informed and understands the inherent risks, including death, decompensation, disability.  He or his authorized caregiver has decided to accept the responsib ility for this decision. Jose Manuel Laurent Sr. and all necessary parties have been advised that he may return for further evaluation or treatment. His condition at time of discharge was stable.  Jose Manuel Laurent Sr. had current vital signs as follow s:  /75 (BP Location: Right arm)   Pulse (!) 49   Temp 99.7 °F (37.6 °C) (Tympanic)   Resp 16                Follow-up Information       Follow up With Specialties Details Why Contact Info Additional Information    Najma Alvarez MD Internal Medicine, Family Medicine Schedule an appointment as soon as possible for a visit in 1 day for follow up [address] [facility]  [address]  [facility] [address]  [phone]       [facility] Neurology [facility] Neurology Schedule an appointment as soon as possible for a visit in 1 day for follow up [address] [facility] [id number] [phone] [facility] Neurology [facility] [place], [place], [id number] [phone]            Discharge Medication List as of 3/22/2024  5:18 PM        START taking these medications    Details   aspirin 325 mg tablet Take 1 tablet (325 mg total) by mouth daily, Starting Fri 3/22/2024, Until Sun 4/21/2024, Normal           CONTINUE these medications which have NOT CHANGED    Details   !! methocarbamol (ROBAXIN) 500 mg tablet Take 2 tablets (1,000 mg total) by mouth 3 (three) times a day, Starting Thu 12/7/2023, Normal      !! methocarbamol (ROBAXIN) 750 mg tablet Take 1 tablet every 6 hours by oral route., Historical Med       !! - Potential duplicate medications found. Please discuss with provider.          No discharge procedures on file.    PDMP Review       None            ED Provider  Electronically Signed by             Feliz Toro DO  03/22/24 1933

## 2024-03-22 NOTE — ED NOTES
Pt signs AMA form.  PT conversational.  NO distress noted,       Johanne Mejia, DELISA  03/22/24 0447

## 2024-03-22 NOTE — Clinical Note
Date: 3/22/2024  Patient: Jose Manuel Laurent Sr.  Admitted: 3/22/2024  1:54 PM  Attending Provider: Feliz Toro DO    Jose Manuel Laurent Sr. or his authorized caregiver has made the decision for the patient to leave the emergency department a gainst the advice of the emergency department staff. He or his authorized caregiver has been informed and understands the inherent risks, including death, decompensation, disability.  He or his authorized caregiver has decided to accept the responsib ility for this decision. Jose Manuel Laurent Sr. and all necessary parties have been advised that he may return for further evaluation or treatment. His condition at time of discharge was stable.  Jose Manuel Laurent Sr. had current vital signs as follow s:  /75 (BP Location: Right arm)   Pulse (!) 49   Temp 99.7 °F (37.6 °C) (Tympanic)   Resp 16

## 2024-03-22 NOTE — DISCHARGE INSTRUCTIONS
You refused admission for further evaluation of your episode of aphasia  Return to the ER for further concerns or worsening symptoms  Follow up with your primary care physician and neurology in 1-2 days

## 2024-03-24 LAB
ATRIAL RATE: 47 BPM
P AXIS: 20 DEGREES
PR INTERVAL: 184 MS
QRS AXIS: 36 DEGREES
QRSD INTERVAL: 100 MS
QT INTERVAL: 442 MS
QTC INTERVAL: 391 MS
T WAVE AXIS: 36 DEGREES
VENTRICULAR RATE: 47 BPM

## 2024-03-24 PROCEDURE — 93010 ELECTROCARDIOGRAM REPORT: CPT | Performed by: INTERNAL MEDICINE

## 2024-03-25 ENCOUNTER — TELEPHONE (OUTPATIENT)
Age: 64
End: 2024-03-25

## 2024-03-25 NOTE — TELEPHONE ENCOUNTER
Pt called in stating he went to the ER on Friday and was diagnosed with a TIA. Pt left AMA due to being the sole caregiver to his wife. Pt wanted to know if he can get a sooner appt than the appt he currently has scheduled for 4/18. Warm transfer to office clerical to get pt scheduled.

## 2024-03-28 ENCOUNTER — OFFICE VISIT (OUTPATIENT)
Dept: FAMILY MEDICINE CLINIC | Facility: CLINIC | Age: 64
End: 2024-03-28
Payer: COMMERCIAL

## 2024-03-28 VITALS
HEIGHT: 71 IN | TEMPERATURE: 97.7 F | RESPIRATION RATE: 16 BRPM | HEART RATE: 80 BPM | SYSTOLIC BLOOD PRESSURE: 122 MMHG | WEIGHT: 214.8 LBS | BODY MASS INDEX: 30.07 KG/M2 | DIASTOLIC BLOOD PRESSURE: 90 MMHG

## 2024-03-28 DIAGNOSIS — Z13.29 SCREENING FOR THYROID DISORDER: ICD-10-CM

## 2024-03-28 DIAGNOSIS — E78.5 HYPERLIPIDEMIA LDL GOAL <100: ICD-10-CM

## 2024-03-28 DIAGNOSIS — I10 PRIMARY HYPERTENSION: ICD-10-CM

## 2024-03-28 DIAGNOSIS — E11.9 TYPE 2 DIABETES MELLITUS WITHOUT COMPLICATION, WITHOUT LONG-TERM CURRENT USE OF INSULIN (HCC): ICD-10-CM

## 2024-03-28 DIAGNOSIS — G45.9 TIA (TRANSIENT ISCHEMIC ATTACK): Primary | ICD-10-CM

## 2024-03-28 DIAGNOSIS — Z11.59 NEED FOR HEPATITIS C SCREENING TEST: ICD-10-CM

## 2024-03-28 PROBLEM — M19.91 LOCALIZED, PRIMARY OSTEOARTHRITIS: Status: RESOLVED | Noted: 2018-09-16 | Resolved: 2024-03-28

## 2024-03-28 LAB
CREAT UR-MCNC: 105.1 MG/DL
MICROALBUMIN UR-MCNC: 13.2 MG/L
MICROALBUMIN/CREAT 24H UR: 13 MG/G CREATININE (ref 0–30)
SL AMB POCT HEMOGLOBIN AIC: 9.7 (ref ?–6.5)

## 2024-03-28 PROCEDURE — 82570 ASSAY OF URINE CREATININE: CPT | Performed by: FAMILY MEDICINE

## 2024-03-28 PROCEDURE — 83036 HEMOGLOBIN GLYCOSYLATED A1C: CPT | Performed by: FAMILY MEDICINE

## 2024-03-28 PROCEDURE — 99214 OFFICE O/P EST MOD 30 MIN: CPT | Performed by: FAMILY MEDICINE

## 2024-03-28 PROCEDURE — 82043 UR ALBUMIN QUANTITATIVE: CPT | Performed by: FAMILY MEDICINE

## 2024-03-28 RX ORDER — ATORVASTATIN CALCIUM 40 MG/1
40 TABLET, FILM COATED ORAL DAILY
Qty: 30 TABLET | Refills: 1 | Status: SHIPPED | OUTPATIENT
Start: 2024-03-28

## 2024-03-28 RX ORDER — ASPIRIN 325 MG
325 TABLET ORAL DAILY
Qty: 30 TABLET | Refills: 1 | Status: SHIPPED | OUTPATIENT
Start: 2024-03-28

## 2024-03-28 RX ORDER — LISINOPRIL 10 MG/1
10 TABLET ORAL DAILY
Qty: 30 TABLET | Refills: 1 | Status: SHIPPED | OUTPATIENT
Start: 2024-03-28

## 2024-03-28 RX ORDER — ASPIRIN 325 MG
325 TABLET ORAL DAILY
Qty: 30 TABLET | Refills: 0 | Status: SHIPPED | OUTPATIENT
Start: 2024-03-28 | End: 2024-03-28 | Stop reason: SDUPTHER

## 2024-03-28 NOTE — PROGRESS NOTES
Name: Jose Manuel Laurent Sr.      : 1960      MRN: [id number]  Encounter Provider: Marybeth Rojas MD  Encounter Date: 3/28/2024   Encounter department: [facility]    Assessment & Plan     1. TIA (transient ischemic attack)  Assessment & Plan:  He was seen in the ER on 3/22/24 for transient aphasia and R arm numbness. Imaging showed chronic lacunar infarct, but no acute abnormality. He was diagnosed with a TIA and advised to be admitted, but he declined because he has to take care of his wife who recently had a stroke.   Counseled on importance of taking aspirin. Will restart statin with atorvastatin 40mg daily.   Will order echo.    Follow up with neurology.     Orders:  -     atorvastatin (LIPITOR) 40 mg tablet; Take 1 tablet (40 mg total) by mouth daily  -     Ambulatory Referral to Neurology; Future  -     Echo complete w/ contrast if indicated; Future; Expected date: 2024  -     aspirin 325 mg tablet; Take 1 tablet (325 mg total) by mouth daily    2. Type 2 diabetes mellitus without complication, without long-term current use of insulin (Formerly [facility])  Assessment & Plan:  Uncontrolled. He has not been compliant with his medications. Will restart metformin today at 1000 mg BID. Urine collected for albumin/creat. Lisinopril and statin restarted. He will go to his eye doctor for eye exam.   Lab Results   Component Value Date    HGBA1C 9.7 (A) 2024       Orders:  -     POCT hemoglobin A1c  -     Albumin / creatinine urine ratio  -     atorvastatin (LIPITOR) 40 mg tablet; Take 1 tablet (40 mg total) by mouth daily  -     metFORMIN (GLUCOPHAGE) 1000 MG tablet; Take 1 tablet (1,000 mg total) by mouth 2 (two) times a day with meals    3. Primary hypertension  Assessment & Plan:  Will restart lisinopril which he has been noncompliant with.     Orders:  -     lisinopril (ZESTRIL) 10 mg tablet; Take 1 tablet (10 mg total) by mouth daily    4. Hyperlipidemia LDL goal <100  Assessment & Plan:  His  cholesterol levels are usually stable, but given recent TIA and history of diabetes, will start lipitor 40mg daily.     Orders:  -     Lipid Panel with Direct LDL reflex; Future  -     Comprehensive metabolic panel; Future  -     atorvastatin (LIPITOR) 40 mg tablet; Take 1 tablet (40 mg total) by mouth daily    5. Need for hepatitis C screening test  -     Hepatitis C Antibody; Future    6. Screening for thyroid disorder  -     TSH, 3rd generation with Free T4 reflex; Future           Subjective      HPI  Mr. Laurent is a 64 yo male with a history of hypertension, hyperlipidemia, type II DM, arthritis who is here today for ER follow up visit after being seen at [facility] ER on 3/22/24 for transient aphasia and right arm numbness. CTA head/neck showed no acute stroke, but diagnosis was thought to be a TIA. He was advised on admission, but declined and left AMA because he had to take care of his wife who recently had a stroke. His symptoms have since completely resolved. He was started on aspirin 325 mg, but he didn't know aspirin was sent to his pharmacy so he has just been taking OTC baby aspirin. He has been non compliant with all of his prior medications. Stopped statin, lisinopril, metformin because he has been too busy focusing on his wife.     Review of Systems   Constitutional: Negative.    HENT: Negative.     Eyes: Negative.    Respiratory: Negative.     Cardiovascular: Negative.    Gastrointestinal: Negative.    Endocrine: Negative.    Genitourinary: Negative.    Musculoskeletal: Negative.    Neurological: Negative.    Hematological: Negative.    Psychiatric/Behavioral: Negative.         Current Outpatient Medications on File Prior to Visit   Medication Sig    [DISCONTINUED] aspirin 325 mg tablet Take 1 tablet (325 mg total) by mouth daily (Patient taking differently: Take 81 mg by mouth daily)    [DISCONTINUED] methocarbamol (ROBAXIN) 500 mg tablet Take 2 tablets (1,000 mg total) by mouth 3 (three)  "times a day (Patient not taking: Reported on 3/22/2024)    [DISCONTINUED] methocarbamol (ROBAXIN) 750 mg tablet Take 1 tablet every 6 hours by oral route. (Patient not taking: Reported on 3/22/2024)       Objective     /90   Pulse 80   Temp 97.7 °F (36.5 °C) (Tympanic)   Resp 16   Ht 5' 11\" (1.803 m)   Wt 97.4 kg (214 lb 12.8 oz)   BMI 29.96 kg/m²     Physical Exam  Constitutional:       General: He is not in acute distress.     Appearance: He is well-developed. He is not diaphoretic.   HENT:      Head: Normocephalic and atraumatic.   Eyes:      General: No scleral icterus.        Right eye: No discharge.         Left eye: No discharge.      Conjunctiva/sclera: Conjunctivae normal.   Cardiovascular:      Rate and Rhythm: Normal rate and regular rhythm.      Pulses: Normal pulses.   Pulmonary:      Effort: Pulmonary effort is normal. No respiratory distress.      Breath sounds: Normal breath sounds. No stridor. No wheezing, rhonchi or rales.   Chest:      Chest wall: No tenderness.   Musculoskeletal:         General: Normal range of motion.      Cervical back: Normal range of motion.   Skin:     General: Skin is warm.   Neurological:      General: No focal deficit present.      Mental Status: He is alert and oriented to person, place, and time.      Cranial Nerves: No cranial nerve deficit.      Sensory: No sensory deficit.      Motor: No weakness.      Coordination: Coordination normal.      Gait: Gait normal.   Psychiatric:         Mood and Affect: Mood normal.         Behavior: Behavior normal.         Thought Content: Thought content normal.         Judgment: Judgment normal.       Marybeth Rojas MD    "

## 2024-03-28 NOTE — ASSESSMENT & PLAN NOTE
Uncontrolled. He has not been compliant with his medications. Will restart metformin today at 1000 mg BID. Urine collected for albumin/creat. Lisinopril and statin restarted. He will go to his eye doctor for eye exam.   Lab Results   Component Value Date    HGBA1C 9.7 (A) 03/28/2024

## 2024-03-28 NOTE — ASSESSMENT & PLAN NOTE
His cholesterol levels are usually stable, but given recent TIA and history of diabetes, will start lipitor 40mg daily.

## 2024-03-28 NOTE — ASSESSMENT & PLAN NOTE
He was seen in the ER on 3/22/24 for transient aphasia and R arm numbness. Imaging showed chronic lacunar infarct, but no acute abnormality. He was diagnosed with a TIA and advised to be admitted, but he declined because he has to take care of his wife who recently had a stroke.   Counseled on importance of taking aspirin. Will restart statin with atorvastatin 40mg daily.   Will order echo.    Follow up with neurology.

## 2024-04-22 ENCOUNTER — OFFICE VISIT (OUTPATIENT)
Dept: URGENT CARE | Facility: CLINIC | Age: 64
End: 2024-04-22
Payer: COMMERCIAL

## 2024-04-22 VITALS
SYSTOLIC BLOOD PRESSURE: 142 MMHG | WEIGHT: 223 LBS | RESPIRATION RATE: 16 BRPM | HEART RATE: 63 BPM | BODY MASS INDEX: 31.1 KG/M2 | OXYGEN SATURATION: 96 % | TEMPERATURE: 97.6 F | DIASTOLIC BLOOD PRESSURE: 76 MMHG

## 2024-04-22 DIAGNOSIS — S39.012A STRAIN OF LUMBAR REGION, INITIAL ENCOUNTER: Primary | ICD-10-CM

## 2024-04-22 PROCEDURE — 99203 OFFICE O/P NEW LOW 30 MIN: CPT | Performed by: PHYSICIAN ASSISTANT

## 2024-04-22 RX ORDER — METHOCARBAMOL 500 MG/1
500 TABLET, FILM COATED ORAL 3 TIMES DAILY PRN
Qty: 21 TABLET | Refills: 0 | Status: SHIPPED | OUTPATIENT
Start: 2024-04-22

## 2024-04-22 NOTE — PROGRESS NOTES
[facility]        NAME: Jose Manuel Laurent Sr. is a 63 y.o. male  : 1960    MRN: [id number]  DATE: 2024  TIME: 3:26 PM    Assessment and Plan   Strain of lumbar region, initial encounter [S39.012A]  1. Strain of lumbar region, initial encounter  methocarbamol (ROBAXIN) 500 mg tablet        Pt has previously tolerated robaxin well.  Will not do steroids or NSAIDs at this time.  Recommend lidocaine patches, tylenol, robaxin.  F/u with PCP PRN.  Pt states he understands and agrees to this plan.    Patient Instructions       Follow up with PCP in 3-5 days.  Proceed to  ER if symptoms worsen.    If tests are performed, our office will contact you with results only if changes need to made to the care plan discussed with you at the visit. [facility]    Chief Complaint     Chief Complaint   Patient presents with   • Back Pain     Pt presents with back pain started one week ago; states no injury; feels back pain with arm movements         History of Present Illness       Back Pain  This is a recurrent problem. Episode onset: Pt states he has been getting low back pain like this on and off for years. Idential to prior episodes. No injury or inciting event. 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  Also had a few heavy lifting days at work. The problem has been waxing and waning since onset. The pain is present in the lumbar spine. The quality of the pain is described as aching. The pain does not radiate. Pain scale: Moderate at rest but 10/10 with certain movements. The symptoms are aggravated by twisting and bending. Stiffness is present All day. Pertinent negatives include no abdominal pain, bladder incontinence, bowel incontinence, chest pain, fever, headaches, numbness, paresis, paresthesias, pelvic pain, perianal numbness, tingling or weakness. He has tried nothing for the symptoms. The treatment provided no relief.        Review of Systems   Review of Systems   Constitutional: Negative.  Negative for chills, fatigue and fever.   HENT: Negative.     Eyes: Negative.    Respiratory: Negative.  Negative for chest tightness, shortness of breath and wheezing.    Cardiovascular: Negative.  Negative for chest pain and palpitations.   Gastrointestinal: Negative.  Negative for abdominal pain, bowel incontinence, diarrhea, nausea and vomiting.   Endocrine: Negative.    Genitourinary: Negative.  Negative for bladder incontinence and pelvic pain.   Musculoskeletal:  Positive for back pain. Negative for gait problem, neck pain and neck stiffness.   Skin: Negative.  Negative for color change, rash and wound.   Neurological:  Negative for dizziness, tingling, weakness, light-headedness, numbness, headaches and paresthesias.   Hematological: Negative.    Psychiatric/Behavioral: Negative.           Current Medications       Current Outpatient Medications:   •  aspirin 325 mg tablet, Take 1 tablet (325 mg total) by mouth daily, Disp: 30 tablet, Rfl: 1  •  atorvastatin (LIPITOR) 40 mg tablet, Take 1 tablet (40 mg total) by mouth daily, Disp: 30 tablet, Rfl: 1  •  lisinopril (ZESTRIL) 10 mg tablet, Take 1 tablet (10 mg total) by mouth daily, Disp: 30 tablet, Rfl: 1  •  metFORMIN (GLUCOPHAGE) 1000 MG tablet, Take 1 tablet (1,000 mg total) by mouth 2 (two) times a day with meals, Disp: 60 tablet, Rfl: 1  •  methocarbamol (ROBAXIN) 500 mg tablet, Take 1 tablet (500 mg total) by mouth 3 (three) times a day as needed for muscle spasms, Disp: 21 tablet, Rfl: 0    Current Allergies     Allergies as of 04/22/2024   • (No Known Allergies)            The following portions of the patient's history were reviewed and updated as appropriate: allergies, current medications, past family history, past medical history, past social history, past surgical history and problem list.     Past Medical History:   Diagnosis Date   • Benign essential hypertension     LAST  ASSESSED 5/1/17; RESOLVED 5/1/17   • Chronic pain disorder    • Controlled insulin dependent diabetes mellitus     LAST ASSESSED 4/1/15; RESOLVED 5/2/16   • Diabetes mellitus (HCC) Long time ago   • Hyperlipidemia    • Hypertension    • Low back pain    • Neck pain    • Osteoarthritis    • Pericarditis    • Type 2 diabetes mellitus (HCC)        Past Surgical History:   Procedure Laterality Date   • JOINT REPLACEMENT  10 years ago   • KNEE SURGERY     • NERVE BLOCK Bilateral 07/19/2019    Procedure: C3 C4 C5 C6 Medial Branch Block #1 ([id number]);  Surgeon: Demario Ordaz MD;  Location: [facility] MAIN OR;  Service: Pain Management    • NERVE BLOCK Bilateral 08/02/2019    Procedure: C3 C4 C5 C6 Medial Branch Block #2 ([id number]);  Surgeon: Demario Ordaz MD;  Location: [facility] MAIN OR;  Service: Pain Management    • RADIOFREQUENCY ABLATION Right 08/16/2019    Procedure: C3 C4 C5 C6 RADIOFREQUENCY ABLATION;  Surgeon: Demario Ordaz MD;  Location: [facility] MAIN OR;  Service: Pain Management    • RADIOFREQUENCY ABLATION Left 08/30/2019    Procedure: C3 C4 C5 C6 RADIOFREQUENCY ABLATION;  Surgeon: Demario Ordaz MD;  Location: [facility] MAIN OR;  Service: Pain Management    • REPLACEMENT TOTAL KNEE Right    • WRIST SURGERY         Family History   Problem Relation Age of Onset   • Cancer Family    • Cancer Mother    • Cancer Father    • No Known Problems Sister    • Cancer Brother    • No Known Problems Maternal Aunt    • No Known Problems Maternal Uncle    • No Known Problems Paternal Aunt    • No Known Problems Paternal Uncle    • No Known Problems Maternal Grandmother    • No Known Problems Maternal Grandfather    • No Known Problems Paternal Grandmother    • No Known Problems Paternal Grandfather    • Diabetes Brother          Medications have been verified.        Objective   /76   Pulse 63   Temp 97.6 °F (36.4 °C)   Resp 16   Wt 101 kg (223 lb)   SpO2 96%   BMI 31.10 kg/m²        Physical Exam      Physical Exam  Vitals and nursing note reviewed. Exam conducted with a chaperone present.   Constitutional:       General: He is not in acute distress.     Appearance: Normal appearance. He is well-developed. He is not ill-appearing or diaphoretic.   HENT:      Head: Normocephalic and atraumatic.   Cardiovascular:      Rate and Rhythm: Normal rate and regular rhythm.      Heart sounds: Normal heart sounds.   Pulmonary:      Effort: Pulmonary effort is normal. No respiratory distress.      Breath sounds: Normal breath sounds. No wheezing, rhonchi or rales.   Musculoskeletal:      Cervical back: Normal range of motion and neck supple.      Comments: FROM lumbar spine, pain elicited with twisting and sideways bending. Diffuse lumbar tenderness no specific bony tenderness.  (-)SLR (-)Sitting root.  Normal gait   Lymphadenopathy:      Cervical: No cervical adenopathy.   Skin:     General: Skin is warm.      Capillary Refill: Capillary refill takes less than 2 seconds.      Coloration: Skin is not pale.      Findings: No rash.   Neurological:      Mental Status: He is alert.

## 2024-04-22 NOTE — LETTER
April 22, 2024     Patient: Jose Manuel Laurent Sr.   YOB: 1960   Date of Visit: 4/22/2024       To Whom It May Concern:    It is my medical opinion that Jose Manuel Laurent may return to work on 4/25/2024 .    If you have any questions or concerns, please don't hesitate to call.         Sincerely,        Donaldo Jones PA-C    CC: No Recipients

## 2024-04-22 NOTE — PATIENT INSTRUCTIONS
Continue to monitor symptoms.  If new or worsening symptoms develop, go immediately to Er. Drink plenty of fluids.  Follow up with Family Doctor this week.    Low Back Strain   WHAT YOU NEED TO KNOW:   Low back strain is an injury to your lower back muscles or tendons. Tendons are strong tissues that connect muscles to bones. The lower back supports most of your body weight and helps you move, twist, and bend.  DISCHARGE INSTRUCTIONS:   Return to the emergency department if:   You hear or feel a pop in your lower back.    You have increased swelling or pain in your lower back.    You have trouble moving your legs.    Your legs are numb.    Call your doctor if:   You have a fever.    Your pain does not go away, even after treatment.    You have questions or concerns about your condition or care.    Medicines:  The following medicines may be ordered by your healthcare provider:  Acetaminophen  decreases pain and fever. It is available without a doctor's order. Ask how much to take and how often to take it. Follow directions. Read the labels of all other medicines you are using to see if they also contain acetaminophen, or ask your doctor or pharmacist. Acetaminophen can cause liver damage if not taken correctly.    NSAIDs , such as ibuprofen, help decrease swelling, pain, and fever. This medicine is available with or without a doctor's order. NSAIDs can cause stomach bleeding or kidney problems in certain people. If you take blood thinner medicine, always ask your healthcare provider if NSAIDs are safe for you. Always read the medicine label and follow directions.    Muscle relaxers  help decrease pain and muscle spasms.    Prescription pain medicine  may be given. Ask your healthcare provider how to take this medicine safely. Some prescription pain medicines contain acetaminophen. Do not take other medicines that contain acetaminophen without talking to your healthcare provider. Too much acetaminophen may cause liver  damage. Prescription pain medicine may cause constipation. Ask your healthcare provider how to prevent or treat constipation.     Take your medicine as directed.  Contact your healthcare provider if you think your medicine is not helping or if you have side effects. Tell your provider if you are allergic to any medicine. Keep a list of the medicines, vitamins, and herbs you take. Include the amounts, and when and why you take them. Bring the list or the pill bottles to follow-up visits. Carry your medicine list with you in case of an emergency.    Self-care:   Rest  as directed. You may need to rest in bed for a period of time after your injury. Do not lift heavy objects.    Apply ice  on your back for 15 to 20 minutes every hour or as directed. Use an ice pack, or put crushed ice in a plastic bag. Cover it with a towel. Ice helps prevent tissue damage and decreases swelling and pain.    Apply heat  on your lower back for 20 to 30 minutes every 2 hours for as many days as directed. Heat helps decrease pain and muscle spasms.    Slowly start to increase your activity  as the pain decreases, or as directed.    Prevent another low back strain:   Use correct body movements.      Bend at the hips and knees when you  objects. Do not bend from the waist. Use your leg muscles as you lift the load. Do not use your back. Keep the object close to your chest as you lift it. Try not to twist or lift anything above your waist.         Change your position often when you stand for long periods of time. Rest one foot on a small box or footrest, and then switch to the other foot often.    Try not to sit for long periods of time. When you do, sit in a straight-backed chair with your feet flat on the floor.    Never reach, pull, or push while you are sitting.    Warm up before you exercise.  Do exercises that strengthen your back muscles. Ask your healthcare provider about the best exercise plan for you.         Maintain a  healthy weight.  Ask your healthcare provider how much you should weigh. Ask him to help you create a weight loss plan if you are overweight.    © Copyright [employer] 2023 Information is for End User's use only and may not be sold, redistributed or otherwise used for commercial purposes.  The above information is an  only. It is not intended as medical advice for individual conditions or treatments. Talk to your doctor, nurse or pharmacist before following any medical regimen to see if it is safe and effective for you.

## 2024-05-01 ENCOUNTER — CONSULT (OUTPATIENT)
Dept: NEUROLOGY | Facility: CLINIC | Age: 64
End: 2024-05-01

## 2024-05-01 VITALS
SYSTOLIC BLOOD PRESSURE: 130 MMHG | BODY MASS INDEX: 30.52 KG/M2 | HEIGHT: 71 IN | TEMPERATURE: 97.3 F | HEART RATE: 74 BPM | WEIGHT: 218 LBS | DIASTOLIC BLOOD PRESSURE: 86 MMHG

## 2024-05-01 DIAGNOSIS — G45.9 TIA (TRANSIENT ISCHEMIC ATTACK): Primary | ICD-10-CM

## 2024-05-01 PROBLEM — M17.9 OSTEOARTHRITIS OF KNEE: Chronic | Status: ACTIVE | Noted: 2018-09-16

## 2024-05-01 NOTE — PATIENT INSTRUCTIONS
TIA (transient ischemic attack)  Pt denies any symptoms to suggest new stroke since he was seen in the ED.  Reviewed CTA result showing a chronic infarct. Pt does not know of a stroke in the past.  Brain MRI ordered to rule out acute stroke.  Reviewed risk factors for stroke.  Pt should continue Aspirin and atorvastatin for secondary stroke prevention.  Continue with good blood pressure control; I would recommend monitoring at home at least 3 times per week; Goal of <130/80; at goal  Continue with good cholesterol control; Goal LDL <70. Lipid panel ordered. Await result.  Continue with good blood sugar control; Goal HgbA1c <7.0. Not at goal. Pt encouraged to make dietary changes.  Pt was encouraged to get regular exercise and follow a Mediterranean diet.  ECHO ordered.  If pt has any new stroke-like symptoms including sudden painless loss of vision or double vision, difficulty speaking or swallowing, vertigo / room spinning that does not quickly resolve, or weakness / numbness affecting 1 side of the face or body he should proceed by ambulance to the nearest emergency room immediately.  I have spent a total time of 60 minutes on 05/01/24 in caring for this patient including Diagnostic results, Prognosis, Risks and benefits of tx options, Instructions for management, Patient and family education, Importance of tx compliance, Risk factor reductions, Impressions, Counseling / Coordination of care, Documenting in the medical record, Reviewing / ordering tests, medicine, procedures  , and Obtaining or reviewing history  .  Pt will follow-up in 3 months.

## 2024-05-01 NOTE — PROGRESS NOTES
Patient ID: Jose Manuel Laurent Sr. is a 63 y.o. male.    Assessment/Plan:    TIA (transient ischemic attack)  Pt denies any symptoms to suggest new stroke since he was seen in the ED.  Reviewed CTA result showing a chronic infarct. Pt does not know of a stroke in the past.  Brain MRI ordered to rule out acute stroke.  Reviewed risk factors for stroke.  Pt should continue Aspirin and atorvastatin for secondary stroke prevention.  Continue with good blood pressure control; I would recommend monitoring at home at least 3 times per week; Goal of <130/80; at goal  Continue with good cholesterol control; Goal LDL <70. Lipid panel ordered. Await result.  Continue with good blood sugar control; Goal HgbA1c <7.0. Not at goal. Pt encouraged to make dietary changes.  Pt was encouraged to get regular exercise and follow a Mediterranean diet.  ECHO ordered.  If pt has any new stroke-like symptoms including sudden painless loss of vision or double vision, difficulty speaking or swallowing, vertigo / room spinning that does not quickly resolve, or weakness / numbness affecting 1 side of the face or body he should proceed by ambulance to the nearest emergency room immediately.  I have spent a total time of 60 minutes on 05/01/24 in caring for this patient including Diagnostic results, Prognosis, Risks and benefits of tx options, Instructions for management, Patient and family education, Importance of tx compliance, Risk factor reductions, Impressions, Counseling / Coordination of care, Documenting in the medical record, Reviewing / ordering tests, medicine, procedures  , and Obtaining or reviewing history  .  Pt will follow-up in 3 months.       Diagnoses and all orders for this visit:    TIA (transient ischemic attack)  -     Ambulatory Referral to Neurology  -     MRI brain without contrast; Future           Subjective:    Jose Manuel Laurent Sr. is a 63 y.o. right handed male, with HTN, hyperlipidemia, DM type 2 and neck pain, who  "presents to the office today after being seen in the ED with symptoms concerning for TIA. A day prior to presentation he experienced intermittent paresthesias in the Rt UE that traveled up to his face. He initially thought that the paresthesias were due to neck pain. When he tried to tell his wife about the symptoms he was unable to talk. The aphasia lasted up to 1 minute. He did not have any other symptoms at that time. CTA was negative for new stroke but did show a chronic lacunar infarct. 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 He was seen by his PCP and an ECHO and lipid panel was ordered. He is taking ASA 325mg daily. His PCP increased it due to the possible TIA. His HA1C was 9.7. He admits to not taking his BP or DM medications. He did restart them about 1 month ago. He does get regular exercise through his job and caring for his wife. He admits to not following a diet. He has been losing weight over the past year. He denies a return of symptoms or any new stroke symptoms.    CTA head and neck 3/22/24:   Chronic lacunar infarct left thalamus. No acute intracranial abnormality.  No significant carotid or vertebral artery stenosis. No focal intracranial stenosis or aneurysm    The following portions of the patient's history were reviewed and updated as appropriate: allergies, current medications, past family history, past medical history, past social history, past surgical history, and problem list.         Objective:    Blood pressure 130/86, pulse 74, temperature (!) 97.3 °F (36.3 °C), height 5' 11\" (1.803 m), weight 98.9 kg (218 lb).    Physical Exam  Vitals reviewed.   Eyes:      General: Lids are normal.      Extraocular Movements: Extraocular movements intact.      Pupils: Pupils are equal, round, and reactive to light.   Neurological:      Motor: Motor strength is normal.     Deep Tendon Reflexes:      Reflex Scores:       " Patellar reflexes are 1+ on the right side and 1+ on the left side.  Psychiatric:         Speech: Speech normal.         Neurological Exam  Mental Status   Oriented to person, place, time and situation. Speech is normal. Language is fluent with no aphasia. Attention and concentration are normal. Fund of knowledge is appropriate for level of education. Apraxia absent.    Cranial Nerves  CN II: Visual fields full to confrontation.  CN III, IV, VI: Extraocular movements intact bilaterally. Normal lids and orbits bilaterally. Pupils equal round and reactive to light bilaterally.  CN V: Facial sensation is normal.  CN VII: Full and symmetric facial movement.  CN XI: Shoulder shrug strength is normal.  CN XII: Tongue midline without atrophy or fasciculations.    Motor   Strength is 5/5 throughout all four extremities.    Reflexes  Deep tendon reflexes are 2+ and symmetric except as noted.                                            Right                      Left  Patellar                                1+                         1+    Coordination  Right: Finger-to-nose normal.Left: Finger-to-nose normal.    Gait  Casual gait is normal including stance, stride, and arm swing.        ROS:    Review of Systems   Constitutional:  Negative for appetite change, fatigue and fever.   HENT: Negative.  Negative for hearing loss, tinnitus, trouble swallowing and voice change.    Eyes: Negative.  Negative for photophobia, pain and visual disturbance.   Respiratory: Negative.  Negative for shortness of breath.    Cardiovascular: Negative.  Negative for palpitations.   Gastrointestinal: Negative.  Negative for nausea and vomiting.   Endocrine: Negative.  Negative for cold intolerance.   Genitourinary: Negative.  Negative for dysuria, frequency and urgency.   Musculoskeletal:  Negative for back pain, gait problem, myalgias, neck pain and neck stiffness.   Skin: Negative.  Negative for rash.   Allergic/Immunologic: Negative.     Neurological: Negative.  Negative for dizziness, tremors, seizures, syncope, facial asymmetry, speech difficulty, weakness, light-headedness, numbness and headaches.   Hematological: Negative.  Does not bruise/bleed easily.   Psychiatric/Behavioral: Negative.  Negative for confusion, hallucinations and sleep disturbance.    All other systems reviewed and are negative.    Review of systems personally reviewed.

## 2024-05-01 NOTE — ASSESSMENT & PLAN NOTE
Pt denies any symptoms to suggest new stroke since he was seen in the ED.  Reviewed CTA result showing a chronic infarct. Pt does not know of a stroke in the past.  Brain MRI ordered to rule out acute stroke.  Reviewed risk factors for stroke.  Pt should continue Aspirin and atorvastatin for secondary stroke prevention.  Continue with good blood pressure control; I would recommend monitoring at home at least 3 times per week; Goal of <130/80; at goal  Continue with good cholesterol control; Goal LDL <70. Lipid panel ordered. Await result.  Continue with good blood sugar control; Goal HgbA1c <7.0. Not at goal. Pt encouraged to make dietary changes.  Pt was encouraged to get regular exercise and follow a Mediterranean diet.  ECHO ordered.  If pt has any new stroke-like symptoms including sudden painless loss of vision or double vision, difficulty speaking or swallowing, vertigo / room spinning that does not quickly resolve, or weakness / numbness affecting 1 side of the face or body he should proceed by ambulance to the nearest emergency room immediately.  I have spent a total time of 60 minutes on 05/01/24 in caring for this patient including Diagnostic results, Prognosis, Risks and benefits of tx options, Instructions for management, Patient and family education, Importance of tx compliance, Risk factor reductions, Impressions, Counseling / Coordination of care, Documenting in the medical record, Reviewing / ordering tests, medicine, procedures  , and Obtaining or reviewing history  .  Pt will follow-up in 3 months.

## 2024-05-12 ENCOUNTER — HOSPITAL ENCOUNTER (OUTPATIENT)
Dept: RADIOLOGY | Facility: HOSPITAL | Age: 64
Discharge: HOME | End: 2024-05-12
Payer: COMMERCIAL

## 2024-05-12 DIAGNOSIS — G45.9 TIA (TRANSIENT ISCHEMIC ATTACK): ICD-10-CM

## 2024-05-12 PROCEDURE — 70551 MRI BRAIN STEM W/O DYE: CPT

## 2024-05-13 LAB
LEFT EYE DIABETIC RETINOPATHY: NORMAL
RIGHT EYE DIABETIC RETINOPATHY: NORMAL
SEVERITY (EYE EXAM): NORMAL

## 2024-05-29 DIAGNOSIS — E11.9 TYPE 2 DIABETES MELLITUS WITHOUT COMPLICATION, WITHOUT LONG-TERM CURRENT USE OF INSULIN (HCC): ICD-10-CM

## 2024-05-29 DIAGNOSIS — G45.9 TIA (TRANSIENT ISCHEMIC ATTACK): ICD-10-CM

## 2024-05-29 DIAGNOSIS — I10 PRIMARY HYPERTENSION: ICD-10-CM

## 2024-05-29 DIAGNOSIS — E78.5 HYPERLIPIDEMIA LDL GOAL <100: ICD-10-CM

## 2024-05-29 RX ORDER — ATORVASTATIN CALCIUM 40 MG/1
TABLET, FILM COATED ORAL
Qty: 30 TABLET | Refills: 0 | Status: SHIPPED | OUTPATIENT
Start: 2024-05-29

## 2024-05-29 RX ORDER — LISINOPRIL 10 MG/1
TABLET ORAL
Qty: 30 TABLET | Refills: 5 | Status: SHIPPED | OUTPATIENT
Start: 2024-05-29

## 2024-06-14 ENCOUNTER — OFFICE VISIT (OUTPATIENT)
Dept: URGENT CARE | Facility: CLINIC | Age: 64
End: 2024-06-14
Payer: COMMERCIAL

## 2024-06-14 VITALS
WEIGHT: 165 LBS | OXYGEN SATURATION: 97 % | TEMPERATURE: 97.8 F | RESPIRATION RATE: 16 BRPM | HEART RATE: 78 BPM | BODY MASS INDEX: 23.01 KG/M2

## 2024-06-14 DIAGNOSIS — T22.211A PARTIAL THICKNESS BURN OF RIGHT FOREARM, INITIAL ENCOUNTER: Primary | ICD-10-CM

## 2024-06-14 PROCEDURE — 99213 OFFICE O/P EST LOW 20 MIN: CPT

## 2024-06-14 NOTE — PATIENT INSTRUCTIONS
Keep wound clean with mild soap and water   Cover with bacitracin ointment and bandages   Monitor for signs of infection     Follow up with PCP in 3-5 days.  Proceed to  ER if symptoms worsen.

## 2024-06-14 NOTE — PROGRESS NOTES
[facility]        NAME: Jose Manuel Laurent Sr. is a 63 y.o. male  : 1960    MRN: [id number]  DATE: 2024  TIME: 11:50 AM    Assessment and Plan   Partial thickness burn of right forearm, initial encounter [T22.211A]  1. Partial thickness burn of right forearm, initial encounter          2 burns, well healing. Dressed with bacitracin and non adhesive pad.    Patient Instructions     Keep wound clean with mild soap and water   Cover with bacitracin ointment and bandages   Monitor for signs of infection     Follow up with PCP in 3-5 days.  Proceed to  ER if symptoms worsen.      If tests are performed, our office will contact you with results only if changes need to made to the care plan discussed with you at the visit. [facility]    Chief Complaint     Chief Complaint   Patient presents with    wound burn     Pt presents with burn of the right forearm 5 days ago r/t burning arm in oven// blister open// pain at site         History of Present Illness       Patient reports burning the top of his right forearm on his oven 5 days ago. Reports them blistering and popping over the last few days. Has been hitting them a lot while at work and wanted to make sure they were healing properly.        Review of Systems   Review of Systems   Constitutional:  Negative for chills and fever.   HENT:  Negative for congestion, postnasal drip, rhinorrhea, sinus pressure, sore throat and trouble swallowing.    Respiratory:  Negative for cough, chest tightness and shortness of breath.    Cardiovascular:  Negative for chest pain and palpitations.   Gastrointestinal:  Negative for abdominal pain, nausea and vomiting.   Genitourinary:  Negative for difficulty urinating.   Musculoskeletal:  Negative for myalgias.   Skin:  Positive for wound.   Neurological:  Negative for dizziness and headaches.         Current Medications       Current Outpatient Medications:     aspirin  325 mg tablet, Take 1 tablet (325 mg total) by mouth daily, Disp: 30 tablet, Rfl: 1    atorvastatin (LIPITOR) 40 mg tablet, TAKE ONE TABLET BY MOUTH EVERY DAY (GENERIC FOR LIPITOR), Disp: 30 tablet, Rfl: 0    lisinopril (ZESTRIL) 10 mg tablet, TAKE ONE TABLET BY MOUTH EVERY DAY (GENERIC FOR ZESTRIL), Disp: 30 tablet, Rfl: 5    metFORMIN (GLUCOPHAGE) 1000 MG tablet, TAKE ONE TABLET BY MOUTH TWICE A DAY WITH MEALS (GENERIC FOR GLUCOPHAGE), Disp: 60 tablet, Rfl: 5    methocarbamol (ROBAXIN) 500 mg tablet, Take 1 tablet (500 mg total) by mouth 3 (three) times a day as needed for muscle spasms, Disp: 21 tablet, Rfl: 0    Current Allergies     Allergies as of 06/14/2024    (No Known Allergies)            The following portions of the patient's history were reviewed and updated as appropriate: allergies, current medications, past family history, past medical history, past social history, past surgical history and problem list.     Past Medical History:   Diagnosis Date    Benign essential hypertension     LAST ASSESSED 5/1/17; RESOLVED 5/1/17    Chronic pain disorder     Controlled insulin dependent diabetes mellitus     LAST ASSESSED 4/1/15; RESOLVED 5/2/16    Diabetes mellitus (HCC) Long time ago    Hyperlipidemia     Hypertension     Low back pain     Neck pain     Osteoarthritis     Pericarditis     Type 2 diabetes mellitus (HCC)        Past Surgical History:   Procedure Laterality Date    JOINT REPLACEMENT  10 years ago    KNEE SURGERY      NERVE BLOCK Bilateral 07/19/2019    Procedure: C3 C4 C5 C6 Medial Branch Block #1 ([id number]);  Surgeon: Demario Ordaz MD;  Location: [facility] MAIN OR;  Service: Pain Management     NERVE BLOCK Bilateral 08/02/2019    Procedure: C3 C4 C5 C6 Medial Branch Block #2 ([id number]);  Surgeon: Demario Ordaz MD;  Location: [facility] MAIN OR;  Service: Pain Management     RADIOFREQUENCY ABLATION Right 08/16/2019    Procedure: C3 C4 C5 C6 RADIOFREQUENCY ABLATION;  Surgeon: Demario  MD Orlin;  Location: [facility] MAIN OR;  Service: Pain Management     RADIOFREQUENCY ABLATION Left 08/30/2019    Procedure: C3 C4 C5 C6 RADIOFREQUENCY ABLATION;  Surgeon: Demario Ordaz MD;  Location: [facility] MAIN OR;  Service: Pain Management     REPLACEMENT TOTAL KNEE Right     WRIST SURGERY         Family History   Problem Relation Age of Onset    Cancer Family     Cancer Mother     Cancer Father     No Known Problems Sister     Cancer Brother     No Known Problems Maternal Aunt     No Known Problems Maternal Uncle     No Known Problems Paternal Aunt     No Known Problems Paternal Uncle     No Known Problems Maternal Grandmother     No Known Problems Maternal Grandfather     No Known Problems Paternal Grandmother     No Known Problems Paternal Grandfather     Diabetes Brother          Medications have been verified.        Objective   Pulse 78   Temp 97.8 °F (36.6 °C)   Resp 16   Wt 74.8 kg (165 lb)   SpO2 97%   BMI 23.01 kg/m²        Physical Exam     Physical Exam  Constitutional:       General: He is not in acute distress.  HENT:      Head: Normocephalic.      Nose: Nose normal.   Eyes:      Pupils: Pupils are equal, round, and reactive to light.   Cardiovascular:      Rate and Rhythm: Normal rate and regular rhythm.      Pulses: Normal pulses.      Heart sounds: Normal heart sounds.   Pulmonary:      Effort: Pulmonary effort is normal.      Breath sounds: Normal breath sounds.   Abdominal:      General: Abdomen is flat.   Musculoskeletal:         General: Normal range of motion.   Skin:     General: Skin is warm and dry.      Capillary Refill: Capillary refill takes less than 2 seconds.      Findings: Burn present.      Comments: Two linear burns on right top for forearm. One with signs of having blistered. No surrounding erythema or swelling. No discharge from wound sites.    Neurological:      Mental Status: He is alert and oriented to person, place, and time.

## 2024-06-25 ENCOUNTER — OFFICE VISIT (OUTPATIENT)
Dept: FAMILY MEDICINE CLINIC | Facility: CLINIC | Age: 64
End: 2024-06-25
Payer: COMMERCIAL

## 2024-06-25 VITALS
DIASTOLIC BLOOD PRESSURE: 74 MMHG | TEMPERATURE: 97.1 F | BODY MASS INDEX: 30.07 KG/M2 | RESPIRATION RATE: 17 BRPM | SYSTOLIC BLOOD PRESSURE: 136 MMHG | HEART RATE: 72 BPM | HEIGHT: 71 IN | WEIGHT: 214.8 LBS

## 2024-06-25 DIAGNOSIS — I10 PRIMARY HYPERTENSION: Primary | ICD-10-CM

## 2024-06-25 DIAGNOSIS — G45.9 TIA (TRANSIENT ISCHEMIC ATTACK): ICD-10-CM

## 2024-06-25 DIAGNOSIS — Z12.11 SCREENING FOR COLON CANCER: ICD-10-CM

## 2024-06-25 DIAGNOSIS — E11.9 TYPE 2 DIABETES MELLITUS WITHOUT COMPLICATION, WITHOUT LONG-TERM CURRENT USE OF INSULIN (HCC): ICD-10-CM

## 2024-06-25 DIAGNOSIS — E78.5 HYPERLIPIDEMIA LDL GOAL <100: ICD-10-CM

## 2024-06-25 PROBLEM — S60.229A: Status: RESOLVED | Noted: 2023-06-01 | Resolved: 2024-06-25

## 2024-06-25 LAB — SL AMB POCT HEMOGLOBIN AIC: 9 (ref ?–6.5)

## 2024-06-25 PROCEDURE — 83036 HEMOGLOBIN GLYCOSYLATED A1C: CPT | Performed by: FAMILY MEDICINE

## 2024-06-25 PROCEDURE — 99214 OFFICE O/P EST MOD 30 MIN: CPT | Performed by: FAMILY MEDICINE

## 2024-06-25 RX ORDER — GLIPIZIDE 2.5 MG/1
TABLET, EXTENDED RELEASE ORAL
COMMUNITY
End: 2024-06-25 | Stop reason: DRUGHIGH

## 2024-06-25 RX ORDER — GLIPIZIDE 5 MG/1
5 TABLET ORAL
Qty: 90 TABLET | Refills: 0 | Status: SHIPPED | OUTPATIENT
Start: 2024-06-25

## 2024-06-25 RX ORDER — LANCETS 33 GAUGE
EACH MISCELLANEOUS
COMMUNITY

## 2024-06-25 RX ORDER — LISINOPRIL 20 MG/1
20 TABLET ORAL DAILY
Qty: 90 TABLET | Refills: 0 | Status: SHIPPED | OUTPATIENT
Start: 2024-06-25

## 2024-06-25 NOTE — PROGRESS NOTES
"Ambulatory Visit  Name: Jose Manuel Laurent Sr.      : 1960      MRN: [id number]  Encounter Provider: Marybeth Rojas MD  Encounter Date: 2024   Encounter department: [facility]    Assessment & Plan   1. Primary hypertension  Assessment & Plan:  Per neurology, recommend BP <130/90. Will increase dose of lisinopril today from 10mg to 20mg daily. Follow up in 3 months.   Orders:  -     lisinopril (ZESTRIL) 20 mg tablet; Take 1 tablet (20 mg total) by mouth daily  2. Hyperlipidemia LDL goal <100  Assessment & Plan:  He has not had blood work done yet. For now continue atorvastatin.   3. Type 2 diabetes mellitus without complication, without long-term current use of insulin ([facility])  Assessment & Plan:  Some improvement noted. A1c improved to 9.0, but still uncontrolled. Will increase dose of glipizide from 2.5mg to 5mg daily. Continue metformin 1000mg BID. He has not been fully compliant with his metformin. Counseled on importance of compliance.   Lab Results   Component Value Date    HGBA1C 9.0 (A) 2024     Orders:  -     glipiZIDE (GLUCOTROL) 5 mg tablet; Take 1 tablet (5 mg total) by mouth daily with breakfast  -     POCT hemoglobin A1c  4. Screening for colon cancer  -     [place]  5. TIA (transient ischemic attack)  Assessment & Plan:  Follows neurology.        History of Present Illness     HPI  He is here today for routine f/u visit.   No acute issues/concerns.   Review of Systems   Constitutional: Negative.    HENT: Negative.     Eyes: Negative.    Respiratory: Negative.     Cardiovascular: Negative.    Gastrointestinal: Negative.    Endocrine: Negative.    Genitourinary: Negative.    Musculoskeletal: Negative.    Neurological: Negative.    Hematological: Negative.    Psychiatric/Behavioral: Negative.         Objective     /74   Pulse 72   Temp (!) 97.1 °F (36.2 °C)   Resp 17   Ht 5' 11\" (1.803 m)   Wt 97.4 kg (214 lb 12.8 oz)   BMI 29.96 kg/m²     Physical Exam  Vitals " and nursing note reviewed.   Constitutional:       General: He is not in acute distress.     Appearance: Normal appearance. He is well-developed.   HENT:      Head: Normocephalic and atraumatic.   Eyes:      Conjunctiva/sclera: Conjunctivae normal.   Cardiovascular:      Rate and Rhythm: Normal rate and regular rhythm.      Pulses: Normal pulses.      Heart sounds: Normal heart sounds. No murmur heard.  Pulmonary:      Effort: Pulmonary effort is normal. No respiratory distress.      Breath sounds: Normal breath sounds.   Musculoskeletal:         General: No swelling.      Cervical back: Neck supple.   Skin:     General: Skin is warm and dry.      Capillary Refill: Capillary refill takes less than 2 seconds.   Neurological:      Mental Status: He is alert.   Psychiatric:         Mood and Affect: Mood normal.       Administrative Statements

## 2024-06-25 NOTE — ASSESSMENT & PLAN NOTE
Some improvement noted. A1c improved to 9.0, but still uncontrolled. Will increase dose of glipizide from 2.5mg to 5mg daily. Continue metformin 1000mg BID. He has not been fully compliant with his metformin. Counseled on importance of compliance.   Lab Results   Component Value Date    HGBA1C 9.0 (A) 06/25/2024

## 2024-06-25 NOTE — ASSESSMENT & PLAN NOTE
Per neurology, recommend BP <130/90. Will increase dose of lisinopril today from 10mg to 20mg daily. Follow up in 3 months.

## 2024-06-26 ENCOUNTER — TELEPHONE (OUTPATIENT)
Dept: ADMINISTRATIVE | Facility: OTHER | Age: 64
End: 2024-06-26

## 2024-06-26 NOTE — LETTER
Diabetic Eye Exam Form    Date Requested: 24  Patient: Jose Manuel Laurent Sr.  Patient : 1960   Referring Provider: Marybeth Rojas MD      DIABETIC Eye Exam Date _______________________________      Type of Exam MUST be documented for Diabetic Eye Exams. Please CHECK ONE.     Retinal Exam       Dilated Retinal Exam       OCT       Optomap-Iris Exam      Fundus Photography       Left Eye - Please check Retinopathy or No Retinopathy        Exam did show retinopathy    Exam did not show retinopathy       Right Eye - Please check Retinopathy or No Retinopathy       Exam did show retinopathy    Exam did not show retinopathy       Comments __________________________________________________________    Practice Providing Exam ______________________________________________    Exam Performed By (print name) _______________________________________      Provider Signature ___________________________________________________      These reports are needed for  compliance.  Please fax this completed form and a copy of the Diabetic Eye Exam report to our office located at [address] [place] [address] as soon as possible via Fax [phone] attention Yulia: Phone [phone]  We thank you for your assistance in treating our mutual patient.

## 2024-06-26 NOTE — LETTER
2nd Request      Diabetic Eye Exam Form    Date Requested: 24  Patient: Jose Manuel Laurent Sr.  Patient : 1960   Referring Provider: Marybeth Rojas MD      DIABETIC Eye Exam Date _______________________________      Type of Exam MUST be documented for Diabetic Eye Exams. Please CHECK ONE.     Retinal Exam       Dilated Retinal Exam       OCT       Optomap-Iris Exam      Fundus Photography       Left Eye - Please check Retinopathy or No Retinopathy        Exam did show retinopathy    Exam did not show retinopathy       Right Eye - Please check Retinopathy or No Retinopathy       Exam did show retinopathy    Exam did not show retinopathy       Comments __________________________________________________________    Practice Providing Exam ______________________________________________    Exam Performed By (print name) _______________________________________      Provider Signature ___________________________________________________      These reports are needed for  compliance.  Please fax this completed form and a copy of the Diabetic Eye Exam report to our office located at [address] [place] [address] as soon as possible via Fax [phone] attention Yulia: Phone [phone]  We thank you for your assistance in treating our mutual patient.

## 2024-06-26 NOTE — TELEPHONE ENCOUNTER
----- Message from Marybeth Rojas MD sent at 6/25/2024 10:44 AM EDT -----  06/25/24 10:44 AM    Bradley, our patient Emilio Laurent has had Diabetic Eye Exam completed/performed. Please assist in updating the patient chart by making an External outreach to [facility] facility located in [place], [place]. The date of service is within the past 3 months.    Thank you,  Marybeth Rojas  [facility]

## 2024-06-26 NOTE — TELEPHONE ENCOUNTER
Upon review of the In Basket request and the patient's chart, initial outreach has been made via fax to facility. Please see Contacts section for details.     Thank you  Yulia Amaya MA

## 2024-07-03 NOTE — TELEPHONE ENCOUNTER
As a follow-up, a second attempt has been made for outreach via fax to facility. Please see Contacts section for details.    Thank you  Yulia Amaya MA

## 2024-07-16 ENCOUNTER — NURSE TRIAGE (OUTPATIENT)
Age: 64
End: 2024-07-16

## 2024-07-16 NOTE — TELEPHONE ENCOUNTER
"Patient called reporting worsening L shoulder and progressing to R shoulder. Tried making an appointment with ortho but earliest OV is not until August. Patient having difficulty working and doing daily tasks. Hx of mini stroke. Patient has been taking 800 mg every 6 hrs of motrin with minimal relief, decreased range of motion. Next day OV available PCP at 10 am. Please schedule patient and follow up. Patient stated if no answer can leave a detailed message.     Reason for Disposition   Patient wants to be seen    Answer Assessment - Initial Assessment Questions  1. ONSET: \"When did the pain start?\"      Few months   2. LOCATION: \"Where is the pain located?\"      L >R  3. PAIN: \"How bad is the pain?\" (Scale 1-10; or mild, moderate, severe)    - MILD (1-3): doesn't interfere with normal activities    - MODERATE (4-7): interferes with normal activities (e.g., work or school) or awakens from sleep    - SEVERE (8-10): excruciating pain, unable to do any normal activities, unable to move arm at all due to pain      Walking 3-4 and then when lifting can't put arm all the way up 9/10   Sharp, stabbing   4. WORK OR EXERCISE: \"Has there been any recent work or exercise that involved this part of the body?\"      Denies   5. CAUSE: \"What do you think is causing the shoulder pain?\"      Unsure   6. OTHER SYMPTOMS: \"Do you have any other symptoms?\" (e.g., neck pain, swelling, rash, fever, numbness, weakness)      Back of neck radiates    Protocols used: Shoulder Pain-ADULT-[place]    "

## 2024-07-17 ENCOUNTER — OFFICE VISIT (OUTPATIENT)
Dept: FAMILY MEDICINE CLINIC | Facility: CLINIC | Age: 64
End: 2024-07-17
Payer: COMMERCIAL

## 2024-07-17 ENCOUNTER — APPOINTMENT (OUTPATIENT)
Dept: RADIOLOGY | Facility: CLINIC | Age: 64
End: 2024-07-17
Payer: COMMERCIAL

## 2024-07-17 VITALS
HEART RATE: 78 BPM | SYSTOLIC BLOOD PRESSURE: 132 MMHG | RESPIRATION RATE: 16 BRPM | BODY MASS INDEX: 30.52 KG/M2 | DIASTOLIC BLOOD PRESSURE: 78 MMHG | WEIGHT: 218 LBS | TEMPERATURE: 97.2 F | HEIGHT: 71 IN

## 2024-07-17 DIAGNOSIS — M25.512 ACUTE PAIN OF BOTH SHOULDERS: ICD-10-CM

## 2024-07-17 DIAGNOSIS — M25.511 ACUTE PAIN OF BOTH SHOULDERS: ICD-10-CM

## 2024-07-17 DIAGNOSIS — M25.512 ACUTE PAIN OF BOTH SHOULDERS: Primary | ICD-10-CM

## 2024-07-17 DIAGNOSIS — M25.511 ACUTE PAIN OF BOTH SHOULDERS: Primary | ICD-10-CM

## 2024-07-17 PROCEDURE — 73030 X-RAY EXAM OF SHOULDER: CPT

## 2024-07-17 PROCEDURE — 99214 OFFICE O/P EST MOD 30 MIN: CPT | Performed by: FAMILY MEDICINE

## 2024-07-17 RX ORDER — NAPROXEN 500 MG/1
500 TABLET ORAL 2 TIMES DAILY WITH MEALS
Qty: 28 TABLET | Refills: 0 | Status: SHIPPED | OUTPATIENT
Start: 2024-07-17 | End: 2024-07-31

## 2024-07-17 NOTE — LETTER
July 17, 2024     Patient: Jose Manuel Laurent Sr.  YOB: 1960  Date of Visit: 7/17/2024      To Whom it May Concern:    Jose Manuel Laurent is under my professional care. Jose Manuel was seen in my office on 7/17/2024.I recommend that he avoid any heavy lifting over 15 lbs due to his current shoulder issues.     If you have any questions or concerns, please don't hesitate to call.         Sincerely,          Marybeth Rojas MD

## 2024-07-17 NOTE — PROGRESS NOTES
"Ambulatory Visit  Name: Jose Manuel Laurent Sr.      : 1960      MRN: [id number]  Encounter Provider: Marybeth Rojas MD  Encounter Date: 2024   Encounter department: [facility]    Assessment & Plan   1. Acute pain of both shoulders  -     XR shoulder 2+ vw left; Future; Expected date: 2024  -     XR shoulder 2+ vw right; Future; Expected date: 2024  -     Ambulatory Referral to Physical Therapy; Future  -     naproxen (Naprosyn) 500 mg tablet; Take 1 tablet (500 mg total) by mouth 2 (two) times a day with meals for 14 days     Recommend rest, ice to the area, gentle stretches, voltaren gel.   History of Present Illness     Shoulder Pain   The pain is present in the right shoulder and left shoulder. This is a new problem. The current episode started more than 1 month ago. There has been no history of extremity trauma. The problem occurs constantly. The problem has been gradually worsening. The quality of the pain is described as aching and sharp. The pain is at a severity of 8/10. Associated symptoms include a limited range of motion and stiffness. Pertinent negatives include no fever, inability to bear weight, itching, joint locking, joint swelling, numbness or tingling. The symptoms are aggravated by activity. He has tried NSAIDS for the symptoms.       Review of Systems   Constitutional:  Negative for fever.   Musculoskeletal:  Positive for stiffness.   Skin:  Negative for itching.   Neurological:  Negative for tingling and numbness.       Objective     /78   Pulse 78   Temp (!) 97.2 °F (36.2 °C) (Temporal)   Resp 16   Ht 5' 11\" (1.803 m)   Wt 98.9 kg (218 lb)   BMI 30.40 kg/m²     Physical Exam  Constitutional:       Appearance: Normal appearance.   HENT:      Head: Normocephalic and atraumatic.   Pulmonary:      Effort: Pulmonary effort is normal.   Musculoskeletal:      Right shoulder: No swelling, deformity, effusion, laceration, tenderness, bony tenderness or " crepitus. Decreased range of motion. Normal strength. Normal pulse.      Left shoulder: No swelling, deformity, effusion, laceration, tenderness, bony tenderness or crepitus. Decreased range of motion. Normal strength. Normal pulse.      Right upper arm: Normal.      Left upper arm: Normal.   Neurological:      Mental Status: He is alert.   Psychiatric:         Mood and Affect: Mood normal.         Behavior: Behavior normal.         Thought Content: Thought content normal.         Judgment: Judgment normal.       Administrative Statements

## 2024-07-17 NOTE — TELEPHONE ENCOUNTER
As a final attempt, a third outreach has been made via telephone call to facility. Please see Contacts section for details. This encounter will be closed and completed by end of day. Should we receive the requested information because of previous outreach attempts, the requested patient's chart will be updated appropriately.     Thank you  Yulia Amaya MA

## 2024-07-17 NOTE — TELEPHONE ENCOUNTER
Upon review of the In Basket request we were able to locate, review, and update the patient chart as requested for Diabetic Eye Exam.    Any additional questions or concerns should be emailed to the Practice Liaisons via the appropriate education email address, please do not reply via In Basket.    Thank you  Yulia Amaya MA   [facility]

## 2024-08-05 ENCOUNTER — OFFICE VISIT (OUTPATIENT)
Dept: URGENT CARE | Facility: CLINIC | Age: 64
End: 2024-08-05
Payer: COMMERCIAL

## 2024-08-05 VITALS
RESPIRATION RATE: 14 BRPM | OXYGEN SATURATION: 96 % | TEMPERATURE: 96.9 F | DIASTOLIC BLOOD PRESSURE: 86 MMHG | HEART RATE: 85 BPM | SYSTOLIC BLOOD PRESSURE: 130 MMHG

## 2024-08-05 DIAGNOSIS — H69.91 ETD (EUSTACHIAN TUBE DYSFUNCTION), RIGHT: Primary | ICD-10-CM

## 2024-08-05 PROCEDURE — 99213 OFFICE O/P EST LOW 20 MIN: CPT | Performed by: PHYSICIAN ASSISTANT

## 2024-08-05 PROCEDURE — 69209 REMOVE IMPACTED EAR WAX UNI: CPT | Performed by: PHYSICIAN ASSISTANT

## 2024-08-05 RX ORDER — FLUTICASONE PROPIONATE 50 MCG
1 SPRAY, SUSPENSION (ML) NASAL DAILY
Qty: 16 G | Refills: 0 | Status: SHIPPED | OUTPATIENT
Start: 2024-08-05

## 2024-08-05 NOTE — PATIENT INSTRUCTIONS
"Patient Education     Eustachian tube problems   The Basics   Written by the doctors and editors at [facility]   What is the Eustachian tube? -- This tube connects the middle ear (the part of the ear behind the eardrum) to the back of the nose and throat (figure 1).  Normally, the Eustachian tube opens and closes. This helps keep the air pressure inside the middle ear the same as the air pressure outside the middle ear. If there is a problem with the tube opening, the air pressure inside the middle ear won't be the same as the air pressure outside it. This can cause ear pain, hearing loss, and other symptoms. \"Ear barotrauma\" is the medical term for when people have symptoms or damage in the middle ear because of air pressure differences.  In some people, the Eustachian tube does not close normally, but stays open all of the time. This can also cause symptoms like hearing the sound of your own voice and breathing.  Most Eustachian tube problems last only a short time and get better on their own. But they can sometimes lead to more serious conditions, such as:   A middle ear infection   A torn eardrum   Hearing loss  In children, long-term hearing loss from Eustachian tube problems can also lead to language or speech problems.  What causes Eustachian tube problems? -- Common causes are:   Illnesses or conditions that make the Eustachian tubes swollen or inflamed - These include colds, allergies, ear infections, or sinus infections. The sinuses are hollow areas in the bones of the face.   Sudden air pressure changes - These can happen when people fly in an airplane, scuba dive, or drive up to the mountains.   Growths that block the Eustachian tube   Being born with an abnormal Eustachian tube  What are the symptoms of a Eustachian tube problem? -- Common symptoms include:   Ear pain   Feeling pressure or fullness in the ear   Trouble hearing   Ringing in the ear   Feeling dizzy   Loud sounds of your own voice or your " "own breathing  Should I see a doctor or nurse? -- See your doctor or nurse if your symptoms are severe, get worse, or don't go away after a few days.  Will I need tests? -- Probably not. Your doctor or nurse should be able to tell if you have a Eustachian tube problem by learning about your symptoms and doing an exam.  If your symptoms are severe, last for a long time, or only affect 1 ear, your doctor or nurse might:   Have you see a special kind of doctor called an ear, nose, and throat (\"ENT\") doctor   Do tests to check your hearing   Do an imaging test - These create pictures of the inside of the body.  How are Eustachian tube problems treated? -- Treatment depends on what's causing the problem. Depending on your individual situation, your doctor might treat you with 1 or more of the following:   Nose sprays   Antihistamines - These medicines are usually used to treat allergies. They help stop itching, sneezing, and runny nose symptoms.   Decongestants - These medicines can help with stuffy nose symptoms.   Instructions to avoid dehydration - Drink a lot of fluids, especially before doing physical activity or being in the heat.   Surgery - Most people do not need surgery for Eustachian tube problems. But people might need surgery if their symptoms don't get better with medicines or they have severe or long-term symptoms.  Antibiotics are not needed to treat Eustachian tube problems. But they might be needed if a person has an ear infection.  All topics are updated as new evidence becomes available and our peer review process is complete.  This topic retrieved from [employer] on: May 19, 2024.  Topic 55848 Version 17.0  Release: 32.4.3 - C32.138  © 2024 UpToDate, Inc. and/or its affiliates. All rights reserved.  figure 1: Eustachian tube     The Eustachian tube is a tube that connects the middle ear (the part of the ear behind the eardrum) to the back of the nose and throat.  Graphic 52513 Version 2.0  Consumer " Information Use and Disclaimer   Disclaimer: This generalized information is a limited summary of diagnosis, treatment, and/or medication information. It is not meant to be comprehensive and should be used as a tool to help the user understand and/or assess potential diagnostic and treatment options. It does NOT include all information about conditions, treatments, medications, side effects, or risks that may apply to a specific patient. It is not intended to be medical advice or a substitute for the medical advice, diagnosis, or treatment of a health care provider based on the health care provider's examination and assessment of a patient's specific and unique circumstances. Patients must speak with a health care provider for complete information about their health, medical questions, and treatment options, including any risks or benefits regarding use of medications. This information does not endorse any treatments or medications as safe, effective, or approved for treating a specific patient. UpToDate, Inc. and its affiliates disclaim any warranty or liability relating to this information or the use thereof.The use of this information is governed by the Terms of Use, available at https://www.woltersProfistauwer.com/en/know/clinical-effectiveness-terms. 2024© UpToDate, Inc. and its affiliates and/or licensors. All rights reserved.  Copyright   © 2024 UpToDate, Inc. and/or its affiliates. All rights reserved.

## 2024-08-05 NOTE — PROGRESS NOTES
"  [facility]        NAME: Jose Manuel Laurent Sr. is a 63 y.o. male  : 1960    MRN: [id number]  DATE: 2024  TIME: 10:59 AM    Assessment and Plan   ETD (Eustachian tube dysfunction), right [H69.91]  1. ETD (Eustachian tube dysfunction), right  fluticasone (FLONASE) 50 mcg/act nasal spray    Ambulatory Referral to Otolaryngology        Discussed with the patient that wax is not obstructing his TM.  Therefore, it is not causing his muffled hearing.  We did attempt to flush out the small amount of wax of the right ear.  Patient wanted to stop the procedure.  Recommend he follow-up with ENT and use Flonase for possible eustachian tube dysfunction.    Patient Instructions     Patient Instructions   Patient Education     Eustachian tube problems   The Basics   Written by the doctors and editors at [facility]   What is the Eustachian tube? -- This tube connects the middle ear (the part of the ear behind the eardrum) to the back of the nose and throat (figure 1).  Normally, the Eustachian tube opens and closes. This helps keep the air pressure inside the middle ear the same as the air pressure outside the middle ear. If there is a problem with the tube opening, the air pressure inside the middle ear won't be the same as the air pressure outside it. This can cause ear pain, hearing loss, and other symptoms. \"Ear barotrauma\" is the medical term for when people have symptoms or damage in the middle ear because of air pressure differences.  In some people, the Eustachian tube does not close normally, but stays open all of the time. This can also cause symptoms like hearing the sound of your own voice and breathing.  Most Eustachian tube problems last only a short time and get better on their own. But they can sometimes lead to more serious conditions, such as:   A middle ear infection   A torn eardrum   Hearing loss  In children, long-term hearing loss from Eustachian tube problems can also lead to " "language or speech problems.  What causes Eustachian tube problems? -- Common causes are:   Illnesses or conditions that make the Eustachian tubes swollen or inflamed - These include colds, allergies, ear infections, or sinus infections. The sinuses are hollow areas in the bones of the face.   Sudden air pressure changes - These can happen when people fly in an airplane, scuba dive, or drive up to the mountains.   Growths that block the Eustachian tube   Being born with an abnormal Eustachian tube  What are the symptoms of a Eustachian tube problem? -- Common symptoms include:   Ear pain   Feeling pressure or fullness in the ear   Trouble hearing   Ringing in the ear   Feeling dizzy   Loud sounds of your own voice or your own breathing  Should I see a doctor or nurse? -- See your doctor or nurse if your symptoms are severe, get worse, or don't go away after a few days.  Will I need tests? -- Probably not. Your doctor or nurse should be able to tell if you have a Eustachian tube problem by learning about your symptoms and doing an exam.  If your symptoms are severe, last for a long time, or only affect 1 ear, your doctor or nurse might:   Have you see a special kind of doctor called an ear, nose, and throat (\"ENT\") doctor   Do tests to check your hearing   Do an imaging test - These create pictures of the inside of the body.  How are Eustachian tube problems treated? -- Treatment depends on what's causing the problem. Depending on your individual situation, your doctor might treat you with 1 or more of the following:   Nose sprays   Antihistamines - These medicines are usually used to treat allergies. They help stop itching, sneezing, and runny nose symptoms.   Decongestants - These medicines can help with stuffy nose symptoms.   Instructions to avoid dehydration - Drink a lot of fluids, especially before doing physical activity or being in the heat.   Surgery - Most people do not need surgery for Eustachian tube " problems. But people might need surgery if their symptoms don't get better with medicines or they have severe or long-term symptoms.  Antibiotics are not needed to treat Eustachian tube problems. But they might be needed if a person has an ear infection.  All topics are updated as new evidence becomes available and our peer review process is complete.  This topic retrieved from [employer] on: May 19, 2024.  Topic 07061 Version 17.0  Release: 32.4.3 - C32.138  © 2024 UpToDate, Inc. and/or its affiliates. All rights reserved.  figure 1: Eustachian tube     The Eustachian tube is a tube that connects the middle ear (the part of the ear behind the eardrum) to the back of the nose and throat.  Graphic 52490 Version 2.0  Consumer Information Use and Disclaimer   Disclaimer: This generalized information is a limited summary of diagnosis, treatment, and/or medication information. It is not meant to be comprehensive and should be used as a tool to help the user understand and/or assess potential diagnostic and treatment options. It does NOT include all information about conditions, treatments, medications, side effects, or risks that may apply to a specific patient. It is not intended to be medical advice or a substitute for the medical advice, diagnosis, or treatment of a health care provider based on the health care provider's examination and assessment of a patient's specific and unique circumstances. Patients must speak with a health care provider for complete information about their health, medical questions, and treatment options, including any risks or benefits regarding use of medications. This information does not endorse any treatments or medications as safe, effective, or approved for treating a specific patient. UpToDate, Inc. and its affiliates disclaim any warranty or liability relating to this information or the use thereof.The use of this information is governed by the Terms of Use, available at  https://www.[employer].com/en/know/clinical-effectiveness-terms. 2024© UpToDate, Inc. and its affiliates and/or licensors. All rights reserved.  Copyright   © 2024 UpToDate, Inc. and/or its affiliates. All rights reserved.        Follow up with PCP in 3-5 days.  Proceed to  ER if symptoms worsen.    Chief Complaint     Chief Complaint   Patient presents with   • Earache     Right sided ear feels muffled.  Removed piece of wax from it on Saturday but is still bothering him.          History of Present Illness       The patient is a 63-year-old male presenting today for his right ear feeling muffled.  Reports that he was able to get some wax out over the weekend.      Review of Systems   Review of Systems   HENT:  Positive for hearing loss.          Current Medications       Current Outpatient Medications:   •  aspirin 325 mg tablet, Take 1 tablet (325 mg total) by mouth daily, Disp: 30 tablet, Rfl: 1  •  atorvastatin (LIPITOR) 40 mg tablet, TAKE ONE TABLET BY MOUTH EVERY DAY (GENERIC FOR LIPITOR), Disp: 30 tablet, Rfl: 0  •  fluticasone (FLONASE) 50 mcg/act nasal spray, 1 spray into each nostril daily, Disp: 16 g, Rfl: 0  •  glipiZIDE (GLUCOTROL) 5 mg tablet, Take 1 tablet (5 mg total) by mouth daily with breakfast, Disp: 90 tablet, Rfl: 0  •  glucose blood test strip, , Disp: , Rfl:   •  lisinopril (ZESTRIL) 20 mg tablet, Take 1 tablet (20 mg total) by mouth daily, Disp: 90 tablet, Rfl: 0  •  metFORMIN (GLUCOPHAGE) 1000 MG tablet, TAKE ONE TABLET BY MOUTH TWICE A DAY WITH MEALS (GENERIC FOR GLUCOPHAGE), Disp: 60 tablet, Rfl: 5  •  OneTouch Delica Lancets 33G MISC, , Disp: , Rfl:   •  naproxen (Naprosyn) 500 mg tablet, Take 1 tablet (500 mg total) by mouth 2 (two) times a day with meals for 14 days, Disp: 28 tablet, Rfl: 0    Current Allergies     Allergies as of 08/05/2024   • (No Known Allergies)            The following portions of the patient's history were reviewed and updated as appropriate: allergies,  current medications, past family history, past medical history, past social history, past surgical history and problem list.     Past Medical History:   Diagnosis Date   • Benign essential hypertension     LAST ASSESSED 5/1/17; RESOLVED 5/1/17   • Chronic pain disorder    • Controlled insulin dependent diabetes mellitus     LAST ASSESSED 4/1/15; RESOLVED 5/2/16   • Diabetes mellitus (HCC) Long time ago   • Hyperlipidemia    • Hypertension    • Low back pain    • Neck pain    • Osteoarthritis    • Pericarditis    • Type 2 diabetes mellitus (HCC)        Past Surgical History:   Procedure Laterality Date   • JOINT REPLACEMENT  10 years ago   • KNEE SURGERY     • NERVE BLOCK Bilateral 07/19/2019    Procedure: C3 C4 C5 C6 Medial Branch Block #1 ([id number]);  Surgeon: Demario Ordaz MD;  Location: [facility] MAIN OR;  Service: Pain Management    • NERVE BLOCK Bilateral 08/02/2019    Procedure: C3 C4 C5 C6 Medial Branch Block #2 ([id number]);  Surgeon: Demario Ordaz MD;  Location: [facility] MAIN OR;  Service: Pain Management    • RADIOFREQUENCY ABLATION Right 08/16/2019    Procedure: C3 C4 C5 C6 RADIOFREQUENCY ABLATION;  Surgeon: Demario Ordaz MD;  Location: [facility] MAIN OR;  Service: Pain Management    • RADIOFREQUENCY ABLATION Left 08/30/2019    Procedure: C3 C4 C5 C6 RADIOFREQUENCY ABLATION;  Surgeon: Demario Ordaz MD;  Location: [facility] MAIN OR;  Service: Pain Management    • REPLACEMENT TOTAL KNEE Right    • WRIST SURGERY         Family History   Problem Relation Age of Onset   • Cancer Family    • Cancer Mother    • Cancer Father    • No Known Problems Sister    • Cancer Brother    • No Known Problems Maternal Aunt    • No Known Problems Maternal Uncle    • No Known Problems Paternal Aunt    • No Known Problems Paternal Uncle    • No Known Problems Maternal Grandmother    • No Known Problems Maternal Grandfather    • No Known Problems Paternal Grandmother    • No Known Problems Paternal Grandfather    •  Diabetes Brother          Medications have been verified.        Objective   /86   Pulse 85   Temp (!) 96.9 °F (36.1 °C)   Resp 14   SpO2 96%        Physical Exam     Physical Exam  Vitals and nursing note reviewed.   Constitutional:       Appearance: Normal appearance. He is normal weight.   HENT:      Right Ear: Tympanic membrane, ear canal and external ear normal.      Left Ear: Tympanic membrane, ear canal and external ear normal.      Ears:      Comments: Small amount of flaky wax in the right ear canal.  Minimal wax around the ear canal of the left ear.  Neurological:      Mental Status: He is alert.         Ear cerumen removal    Date/Time: 8/5/2024 11:00 AM    Performed by: Jennifer Yost PA-C  Authorized by: Jennifer Yost PA-C  Universal Protocol:  Consent: Verbal consent obtained.  Risks and benefits: risks, benefits and alternatives were discussed  Consent given by: patient  Patient understanding: patient states understanding of the procedure being performed  Patient consent: the patient's understanding of the procedure matches consent given  Procedure consent: procedure consent matches procedure scheduled  Patient identity confirmed: verbally with patient    Patient location:  Clinic  Procedure details:     Local anesthetic:  None    Location:  R ear    Procedure type: irrigation only    Post-procedure details:     Complication:  None    Patient tolerance of procedure:  Procedure terminated at patient's request

## 2024-08-06 DIAGNOSIS — E78.5 HYPERLIPIDEMIA LDL GOAL <100: ICD-10-CM

## 2024-08-06 DIAGNOSIS — G45.9 TIA (TRANSIENT ISCHEMIC ATTACK): ICD-10-CM

## 2024-08-06 DIAGNOSIS — E11.9 TYPE 2 DIABETES MELLITUS WITHOUT COMPLICATION, WITHOUT LONG-TERM CURRENT USE OF INSULIN (HCC): ICD-10-CM

## 2024-08-06 RX ORDER — ASPIRIN 325 MG
325 TABLET ORAL DAILY
Qty: 30 TABLET | Refills: 1 | Status: SHIPPED | OUTPATIENT
Start: 2024-08-06 | End: 2024-08-13 | Stop reason: SDUPTHER

## 2024-08-06 RX ORDER — ATORVASTATIN CALCIUM 40 MG/1
TABLET, FILM COATED ORAL
Qty: 30 TABLET | Refills: 5 | Status: SHIPPED | OUTPATIENT
Start: 2024-08-06

## 2024-08-13 ENCOUNTER — OFFICE VISIT (OUTPATIENT)
Dept: NEUROLOGY | Facility: CLINIC | Age: 64
End: 2024-08-13
Payer: COMMERCIAL

## 2024-08-13 VITALS — HEART RATE: 71 BPM | SYSTOLIC BLOOD PRESSURE: 110 MMHG | DIASTOLIC BLOOD PRESSURE: 70 MMHG

## 2024-08-13 DIAGNOSIS — Z86.73 CHRONIC ARTERIAL ISCHEMIC STROKE: Primary | ICD-10-CM

## 2024-08-13 DIAGNOSIS — G45.9 TIA (TRANSIENT ISCHEMIC ATTACK): ICD-10-CM

## 2024-08-13 PROCEDURE — 99214 OFFICE O/P EST MOD 30 MIN: CPT | Performed by: PHYSICIAN ASSISTANT

## 2024-08-13 RX ORDER — ASPIRIN 325 MG
325 TABLET ORAL DAILY
Qty: 90 TABLET | Refills: 3 | Status: SHIPPED | OUTPATIENT
Start: 2024-08-13

## 2024-08-13 NOTE — PATIENT INSTRUCTIONS
Chronic arterial ischemic stroke  Pt denies any symptoms to suggest new stroke.  Pt should continue Aspirin and atorvastatin for secondary stroke prevention.  Continue with good blood pressure control; I would recommend monitoring at home at least 3 times per week; Goal of <130/80; at goal  Continue with good cholesterol control; Goal LDL <70; at goal  Continue with good blood sugar control; Goal HgbA1c <7.0; at goal  Pt was encouraged to get regular exercise and follow a Mediterranean diet.  If pt has any new stroke-like symptoms including sudden painless loss of vision or double vision, difficulty speaking or swallowing, vertigo / room spinning that does not quickly resolve, or weakness / numbness affecting 1 side of the face or body he should proceed by ambulance to the nearest emergency room immediately.  I have spent a total time of 30 minutes on 08/13/24 in caring for this patient including Diagnostic results, Prognosis, Risks and benefits of tx options, Instructions for management, Patient and family education, Importance of tx compliance, Risk factor reductions, Impressions, Counseling / Coordination of care, Documenting in the medical record, Reviewing / ordering tests, medicine, procedures  , and Obtaining or reviewing history  .  Pt will follow-up in 6 months.

## 2024-08-13 NOTE — ASSESSMENT & PLAN NOTE
Pt denies any symptoms to suggest new stroke.  Pt should continue Aspirin and atorvastatin for secondary stroke prevention.  Continue with good blood pressure control; I would recommend monitoring at home at least 3 times per week; Goal of <130/80; at goal  Continue with good cholesterol control; Goal LDL <70; at goal  Continue with good blood sugar control; Goal HgbA1c <7.0; at goal  Pt was encouraged to get regular exercise and follow a Mediterranean diet.  If pt has any new stroke-like symptoms including sudden painless loss of vision or double vision, difficulty speaking or swallowing, vertigo / room spinning that does not quickly resolve, or weakness / numbness affecting 1 side of the face or body he should proceed by ambulance to the nearest emergency room immediately.  I have spent a total time of 30 minutes on 08/13/24 in caring for this patient including Diagnostic results, Prognosis, Risks and benefits of tx options, Instructions for management, Patient and family education, Importance of tx compliance, Risk factor reductions, Impressions, Counseling / Coordination of care, Documenting in the medical record, Reviewing / ordering tests, medicine, procedures  , and Obtaining or reviewing history  .  Pt will follow-up in 6 months.

## 2024-08-13 NOTE — PROGRESS NOTES
Patient ID: Jose Manuel Laurent Sr. is a 63 y.o. male.    Assessment/Plan:    Chronic arterial ischemic stroke  Pt denies any symptoms to suggest new stroke.  Pt should continue Aspirin and atorvastatin for secondary stroke prevention.  Continue with good blood pressure control; I would recommend monitoring at home at least 3 times per week; Goal of <130/80; at goal  Continue with good cholesterol control; Goal LDL <70; at goal  Continue with good blood sugar control; Goal HgbA1c <7.0; at goal  Pt was encouraged to get regular exercise and follow a Mediterranean diet.  If pt has any new stroke-like symptoms including sudden painless loss of vision or double vision, difficulty speaking or swallowing, vertigo / room spinning that does not quickly resolve, or weakness / numbness affecting 1 side of the face or body he should proceed by ambulance to the nearest emergency room immediately.  I have spent a total time of 30 minutes on 08/13/24 in caring for this patient including Diagnostic results, Prognosis, Risks and benefits of tx options, Instructions for management, Patient and family education, Importance of tx compliance, Risk factor reductions, Impressions, Counseling / Coordination of care, Documenting in the medical record, Reviewing / ordering tests, medicine, procedures  , and Obtaining or reviewing history  .  Pt will follow-up in 6 months.       Diagnoses and all orders for this visit:    Chronic arterial ischemic stroke    TIA (transient ischemic attack)  -     aspirin 325 mg tablet; Take 1 tablet (325 mg total) by mouth daily           Subjective:    FLORES Laurent Sr. is a 63 y.o. right handed male, with HTN, hyperlipidemia, DM type 2 and neck pain, who follows in the office due to a history of TIA a chronic lacunar infarct seen in imaging. He admits to not taking his medications as prescribed. 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 He does  understand the risks of not taking his medications. He does not exercise outside of working full time and taking care of his wife. He admits to not getting regular exercise as well. His PCP ordered an ECHO but he has not had it done yet. He has not had any further events to suggest new stroke.    A1C 9  Due for a lipid panel.     CTA head and neck 3/22/24:   Chronic lacunar infarct left thalamus. No acute intracranial abnormality.  No significant carotid or vertebral artery stenosis. No focal intracranial stenosis or aneurysm    Brain MRI 5/12/24:  1. No acute infarction, intracranial hemorrhage or mass effect.  2. Minimal, chronic microangiopathy.    The following portions of the patient's history were reviewed and updated as appropriate: allergies, current medications, past family history, past medical history, past social history, past surgical history, and problem list.         Objective:    Blood pressure 110/70, pulse 71.    Physical Exam  Vitals reviewed.   Eyes:      General: Lids are normal.      Extraocular Movements: Extraocular movements intact.      Pupils: Pupils are equal, round, and reactive to light.   Neurological:      Motor: Motor strength is normal.     Deep Tendon Reflexes: Reflexes are normal and symmetric.   Psychiatric:         Speech: Speech normal.         Neurological Exam  Mental Status   Oriented to person, place, time and situation. Speech is normal. Language is fluent with no aphasia. Attention and concentration are normal. Fund of knowledge is appropriate for level of education. Apraxia absent.    Cranial Nerves  CN II: Visual fields full to confrontation.  CN III, IV, VI: Extraocular movements intact bilaterally. Normal lids and orbits bilaterally. Pupils equal round and reactive to light bilaterally.  CN V: Facial sensation is normal.  CN VII: Full and symmetric facial movement.  CN XI: Shoulder shrug strength is normal.  CN XII: Tongue midline without atrophy or  fasciculations.    Motor   Strength is 5/5 throughout all four extremities.    Reflexes  Deep tendon reflexes are 2+ and symmetric in all four extremities.    Gait  Casual gait is normal including stance, stride, and arm swing.        ROS:    Review of Systems   Constitutional:  Negative for appetite change, fatigue and fever.   HENT:  Negative for hearing loss, tinnitus, trouble swallowing and voice change.         Right Ear is Muffled     Eyes: Negative.  Negative for photophobia, pain and visual disturbance.   Respiratory: Negative.  Negative for shortness of breath.    Cardiovascular: Negative.  Negative for palpitations.   Gastrointestinal: Negative.  Negative for nausea and vomiting.   Endocrine: Negative.  Negative for cold intolerance.   Genitourinary: Negative.  Negative for dysuria, frequency and urgency.   Musculoskeletal:  Positive for myalgias and neck stiffness (Not bad). Negative for back pain, gait problem and neck pain.        Shoulder pains     Skin: Negative.  Negative for rash.   Allergic/Immunologic: Negative.    Neurological:  Positive for weakness (Shoulders). Negative for dizziness, tremors, seizures, syncope, facial asymmetry, speech difficulty, light-headedness, numbness and headaches.   Hematological: Negative.  Does not bruise/bleed easily.   Psychiatric/Behavioral: Negative.  Negative for confusion, hallucinations and sleep disturbance.    All other systems reviewed and are negative.    Review of systems personally reviewed.

## 2024-08-29 ENCOUNTER — OFFICE VISIT (OUTPATIENT)
Dept: OBGYN CLINIC | Facility: CLINIC | Age: 64
End: 2024-08-29
Payer: COMMERCIAL

## 2024-08-29 VITALS
DIASTOLIC BLOOD PRESSURE: 82 MMHG | HEART RATE: 75 BPM | SYSTOLIC BLOOD PRESSURE: 123 MMHG | WEIGHT: 218 LBS | HEIGHT: 71 IN | BODY MASS INDEX: 30.52 KG/M2

## 2024-08-29 DIAGNOSIS — M75.21 BILATERAL BICEPS TENDINITIS: ICD-10-CM

## 2024-08-29 DIAGNOSIS — M75.81 RIGHT ROTATOR CUFF TENDINITIS: ICD-10-CM

## 2024-08-29 DIAGNOSIS — M75.31 CALCIFIC TENDINITIS OF RIGHT SHOULDER: Primary | ICD-10-CM

## 2024-08-29 DIAGNOSIS — M75.22 BILATERAL BICEPS TENDINITIS: ICD-10-CM

## 2024-08-29 DIAGNOSIS — M75.82 ROTATOR CUFF TENDINITIS, LEFT: ICD-10-CM

## 2024-08-29 PROCEDURE — 20610 DRAIN/INJ JOINT/BURSA W/O US: CPT

## 2024-08-29 PROCEDURE — 99213 OFFICE O/P EST LOW 20 MIN: CPT | Performed by: ORTHOPAEDIC SURGERY

## 2024-08-29 RX ORDER — KETOROLAC TROMETHAMINE 30 MG/ML
60 INJECTION, SOLUTION INTRAMUSCULAR; INTRAVENOUS
Status: COMPLETED | OUTPATIENT
Start: 2024-08-29 | End: 2024-08-29

## 2024-08-29 RX ORDER — ROPIVACAINE HYDROCHLORIDE 5 MG/ML
4 INJECTION, SOLUTION EPIDURAL; INFILTRATION; PERINEURAL
Status: COMPLETED | OUTPATIENT
Start: 2024-08-29 | End: 2024-08-29

## 2024-08-29 RX ADMIN — ROPIVACAINE HYDROCHLORIDE 4 ML: 5 INJECTION, SOLUTION EPIDURAL; INFILTRATION; PERINEURAL at 10:30

## 2024-08-29 RX ADMIN — KETOROLAC TROMETHAMINE 60 MG: 30 INJECTION, SOLUTION INTRAMUSCULAR; INTRAVENOUS at 10:30

## 2024-08-29 NOTE — LETTER
August 29, 2024     Patient: Jose Manuel Laurent Sr.  YOB: 1960  Date of Visit: 8/29/2024      To Whom it May Concern:    Jose Manuel Laurent is under my professional care. Jose Manuel was seen in my office on 8/29/2024. Jose Manuel should continue to avoid any heavy lifting over 15 lbs due to his current shoulder issues.     If you have any questions or concerns, please don't hesitate to call.         Sincerely,          Ron Hein MD        CC: No Recipients

## 2024-08-29 NOTE — PROGRESS NOTES
Orthopedic Sports Medicine New Patient Visit     Assesment:   63 y.o. male with right shoulder calcific tendinitis, bilateral rotator cuff tendinitis, bilateral biceps tendinitis    Plan:    Upon examination today, the patient has well-maintained shoulder motion and strength such that I believe his rotator cuff function remains intact. There is a tiny right shoulder calcification within the rotator cuff tendon that may be contributing to his symptoms.  With his well-maintained range of motion and strength on exam today in the setting of atraumatic shoulder pain, I recommended beginning a course of formal PT. Physician-directed exercises were provided to start prior to his PT course. We also discussed providing a subacromial injection today for immediate pain relief. Because of his elevated A1C of 9.0, I recommended performing a Toradol injection. After discussing the risks, benefits, and alternatives to injections, the patient consented for and underwent the procedure without any acute complications or difficulties. Post-injection instructions were reviewed. We will plan to follow up with Emilio in 2 weeks for a right subacromial Toradol injection. In the meantime, we will continue light duty restrictions          History of Present Illness:    The patient is a 63 y.o., right hand dominant, male, presenting for initial evaluation of atraumatic bilateral shoulder pain localized to the anterior aspect bilaterally for the past 6-12 months. The left shoulder is currently more painful, especially with abduction. Pain also results in sleep disturbance. 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 This flares his pain though he is able to push through discomfort. The patient is also the primary caregiver for his wife who had a stroke about 1.5 years ago. As such, he has little time to care for himself. The patient denies weakness or  numbness/tingling. He has a history of neck surgery with good long-term symptomatic relief, but the patient notes intermittent radiating pain up towards the neck.      Of note, the patient reports history of TIA and diabetes. Most recent A1C is 9.0.    Shoulder Surgical History:  None    Past Medical, Social and Family History:  Past Medical History:   Diagnosis Date    Benign essential hypertension     LAST ASSESSED 5/1/17; RESOLVED 5/1/17    Chronic pain disorder     Controlled insulin dependent diabetes mellitus     LAST ASSESSED 4/1/15; RESOLVED 5/2/16    Diabetes mellitus (HCC) Long time ago    Hyperlipidemia     Hypertension     Low back pain     Neck pain     Osteoarthritis     Pericarditis     Type 2 diabetes mellitus (HCC)      Past Surgical History:   Procedure Laterality Date    JOINT REPLACEMENT  10 years ago    KNEE SURGERY      NERVE BLOCK Bilateral 07/19/2019    Procedure: C3 C4 C5 C6 Medial Branch Block #1 ([id number]);  Surgeon: Demario Ordaz MD;  Location: [facility] MAIN OR;  Service: Pain Management     NERVE BLOCK Bilateral 08/02/2019    Procedure: C3 C4 C5 C6 Medial Branch Block #2 ([id number]);  Surgeon: Demario Ordaz MD;  Location: [facility] MAIN OR;  Service: Pain Management     RADIOFREQUENCY ABLATION Right 08/16/2019    Procedure: C3 C4 C5 C6 RADIOFREQUENCY ABLATION;  Surgeon: Demario Ordaz MD;  Location: [facility] MAIN OR;  Service: Pain Management     RADIOFREQUENCY ABLATION Left 08/30/2019    Procedure: C3 C4 C5 C6 RADIOFREQUENCY ABLATION;  Surgeon: Demario Ordaz MD;  Location: [facility] MAIN OR;  Service: Pain Management     REPLACEMENT TOTAL KNEE Right     WRIST SURGERY       No Known Allergies  Current Outpatient Medications on File Prior to Visit   Medication Sig Dispense Refill    aspirin 325 mg tablet Take 1 tablet (325 mg total) by mouth daily 90 tablet 3    atorvastatin (LIPITOR) 40 mg tablet TAKE ONE TABLET BY MOUTH EVERY DAY (GENERIC FOR LIPITOR) 30 tablet 5     fluticasone (FLONASE) 50 mcg/act nasal spray 1 spray into each nostril daily 16 g 0    glipiZIDE (GLUCOTROL) 5 mg tablet Take 1 tablet (5 mg total) by mouth daily with breakfast 90 tablet 0    lisinopril (ZESTRIL) 20 mg tablet Take 1 tablet (20 mg total) by mouth daily 90 tablet 0    metFORMIN (GLUCOPHAGE) 1000 MG tablet TAKE ONE TABLET BY MOUTH TWICE A DAY WITH MEALS (GENERIC FOR GLUCOPHAGE) 60 tablet 5    glucose blood test strip  (Patient not taking: Reported on 8/29/2024)      naproxen (Naprosyn) 500 mg tablet Take 1 tablet (500 mg total) by mouth 2 (two) times a day with meals for 14 days 28 tablet 0    OneTouch Delica Lancets 33G MISC  (Patient not taking: Reported on 8/29/2024)       No current facility-administered medications on file prior to visit.     Social History     Socioeconomic History    Marital status: /Civil Union     Spouse name: Not on file    Number of children: Not on file    Years of education: Not on file    Highest education level: Not on file   Occupational History    Not on file   Tobacco Use    Smoking status: Never     Passive exposure: Past    Smokeless tobacco: Never   Vaping Use    Vaping status: Never Used   Substance and Sexual Activity    Alcohol use: No    Drug use: Never    Sexual activity: Not Currently     Partners: Female   Other Topics Concern    Not on file   Social History Narrative    DAILY [employer] CONSU[employer]TION      Social Determinants of Health     Financial Resource Strain: Not on file   Food Insecurity: Not on file   Transportation Needs: Not on file   Physical Activity: Not on file   Stress: Not on file   Social Connections: Not on file   Intimate Partner Violence: Not on file   Housing Stability: Not on file         I have reviewed the past medical, surgical, social and family history, medications and allergies as documented in the EMR.    Review of systems: ROS is negative other than that noted in the HPI.  Constitutional: Negative for fatigue and fever.   HENT:  "Negative for sore throat.    Respiratory: Negative for shortness of breath.    Cardiovascular: Negative for chest pain.   Gastrointestinal: Negative for abdominal pain.   Endocrine: Negative for cold intolerance and heat intolerance.   Genitourinary: Negative for flank pain.   Musculoskeletal: Negative for back pain.   Skin: Negative for rash.   Allergic/Immunologic: Negative for immunocompromised state.   Neurological: Negative for dizziness.   Psychiatric/Behavioral: Negative for agitation.      Physical Exam:    Blood pressure 123/82, pulse 75, height 5' 11\" (1.803 m), weight 98.9 kg (218 lb).    General/Constitutional: NAD, well developed, well nourished  HENT: Normocephalic, atraumatic  CV: Intact distal pulses, regular rate  Resp: No respiratory distress or labored breathing  Abdomen: soft, nondistended, non tender   Lymphatic: No lymphadenopathy palpated  Neuro: Alert and Oriented x 3, no focal deficits  Psych: Normal mood, normal affect  Skin: Warm, dry, no rashes, no erythema      BILATERAL Shoulder Exam:      Inspection: No ecchymosis, edema, or deformity. No visualized wounds or skin lesions   Palpation: No significant AC joint tenderness. Moderate bicipital groove tenderness  Active Motion:       FF: 160        ER: 45 right, 40 left with mild pain bilaterally       IR: T12 with mild pain  Strength: 5/5 empty can with mild pain, 5/5 ER,  5/5 IR   Sensory - SILT in the Radial / Ulnar / Median / Axillary nerve distributions  Motor - AIN / PIN / Radial / Ulnar / Median / Axillary motor nerves in tact  Palpable Radial pulse  Cap refill <2secs in all digits    5/5 Belly Press    + Gutierrez bilaterally          Imaging    I reviewed and interpreted X-rays of the left shoulder which show no acute osseous abnormalities or significant degenerative changes. Humerus is well-centered on the glenoid.     I reviewed and interpreted X-rays of the right shoulder which show no acute osseous abnormalities or significant " degenerative changes. Tiny calcification adjacent to lateral humeral head. Humerus is well-centered on the glenoid.     Large joint arthrocentesis: L subacromial bursa  Universal Protocol:  Consent: Verbal consent obtained.  Risks and benefits: risks, benefits and alternatives were discussed  Consent given by: patient  Timeout called at: 8/29/2024 11:08 AM.  Patient understanding: patient states understanding of the procedure being performed  Patient consent: the patient's understanding of the procedure matches consent given  Site marked: the operative site was marked  Radiology Images displayed and confirmed. If images not available, report reviewed: imaging studies available  Patient identity confirmed: verbally with patient  Supporting Documentation  Indications: pain   Procedure Details  Location: shoulder - L subacromial bursa  Preparation: Patient was prepped and draped in the usual sterile fashion  Needle size: 22 G  Approach: posterior  Medications administered: 4 mL ropivacaine 0.5 %; 60 mg ketorolac 60 mg/2 mL    Patient tolerance: patient tolerated the procedure well with no immediate complications  Dressing:  Sterile dressing applied

## 2024-09-12 ENCOUNTER — OFFICE VISIT (OUTPATIENT)
Dept: OBGYN CLINIC | Facility: CLINIC | Age: 64
End: 2024-09-12
Payer: COMMERCIAL

## 2024-09-12 VITALS
HEIGHT: 71 IN | DIASTOLIC BLOOD PRESSURE: 88 MMHG | BODY MASS INDEX: 30.52 KG/M2 | WEIGHT: 218 LBS | SYSTOLIC BLOOD PRESSURE: 145 MMHG | HEART RATE: 79 BPM

## 2024-09-12 DIAGNOSIS — M75.81 RIGHT ROTATOR CUFF TENDINITIS: ICD-10-CM

## 2024-09-12 DIAGNOSIS — M75.82 ROTATOR CUFF TENDINITIS, LEFT: ICD-10-CM

## 2024-09-12 DIAGNOSIS — M75.31 CALCIFIC TENDINITIS OF RIGHT SHOULDER: Primary | ICD-10-CM

## 2024-09-12 PROCEDURE — 99213 OFFICE O/P EST LOW 20 MIN: CPT | Performed by: ORTHOPAEDIC SURGERY

## 2024-09-12 PROCEDURE — 20610 DRAIN/INJ JOINT/BURSA W/O US: CPT

## 2024-09-12 RX ORDER — ROPIVACAINE HYDROCHLORIDE 5 MG/ML
4 INJECTION, SOLUTION EPIDURAL; INFILTRATION; PERINEURAL
Status: COMPLETED | OUTPATIENT
Start: 2024-09-12 | End: 2024-09-12

## 2024-09-12 RX ORDER — KETOROLAC TROMETHAMINE 30 MG/ML
60 INJECTION, SOLUTION INTRAMUSCULAR; INTRAVENOUS
Status: COMPLETED | OUTPATIENT
Start: 2024-09-12 | End: 2024-09-12

## 2024-09-12 RX ADMIN — KETOROLAC TROMETHAMINE 60 MG: 30 INJECTION, SOLUTION INTRAMUSCULAR; INTRAVENOUS at 10:30

## 2024-09-12 RX ADMIN — ROPIVACAINE HYDROCHLORIDE 4 ML: 5 INJECTION, SOLUTION EPIDURAL; INFILTRATION; PERINEURAL at 10:30

## 2024-09-12 NOTE — LETTER
September 12, 2024     Patient: Jose Manuel Laurent Sr.  YOB: 1960  Date of Visit: 9/12/2024      To Whom it May Concern:    Jose Manuel Laurent is under my professional care. Jose Manuel was seen in my office on 9/12/2024. Jose Manuel is to continue light duty restrictions for the next 2 weeks. He can return to full duty at that time.     If you have any questions or concerns, please don't hesitate to call.         Sincerely,          Ron Hein MD        CC: No Recipients

## 2024-09-12 NOTE — PROGRESS NOTES
Orthopedic Sports Medicine Follow Up Patient Visit     Assesment:   63 y.o. male with right shoulder calcific tendinitis, bilateral rotator cuff tendinitis    Plan:    Emilio is a pleasant 63-year-old male presenting for staggered shoulder injections today to treat his right shoulder calcific tendinitis and rotator cuff tendinitis. After discussing the risks, benefits, and alternatives to injections, the patient consented for and underwent the procedure without any acute complications or difficulties. Post-injection instructions were reviewed. He continues to have well-maintained shoulder mobility and strength bilaterally. Given the patient's work schedule and because he is the primary caretaker for his wife, we provided physician-directed exercises for him to complete if he is unable to attend formal PT. I would recommend being evaluated by PT if able. We will plan to follow up with Emilio as needed if his symptoms persist or worsen. He can continue light duty restrictions for the next 2 weeks, then return to full duty. A note was provided for this. All questions were addressed today.          History of Present Illness:    The patient is a 63 y.o., right hand dominant, male, presenting for follow up evaluation of atraumatic bilateral shoulder pain localized to the anterior aspect bilaterally for the past 6-12 months. He presents for staggered Toradol injections for his right shoulder calcific tendinitis/rotator cuff tendinitis. The patient notes a few days worth of significant relief from the left subacromial Toradol injection performed last visit, but notes symptoms gradually returned such that he has persistent difficulty with shoulder abduction. Pain also results in sleep disturbance. 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 This flares his pain though he is able to push through discomfort. He notes mostly being able to perform  light duty at work. The patient has been unable to start PT due to caring for his wife and having limited availability.    Of note, the patient reports history of TIA and diabetes. Most recent A1C is 9.0.    Shoulder Surgical History:  None    Past Medical, Social and Family History:  Past Medical History:   Diagnosis Date    Benign essential hypertension     LAST ASSESSED 5/1/17; RESOLVED 5/1/17    Chronic pain disorder     Controlled insulin dependent diabetes mellitus     LAST ASSESSED 4/1/15; RESOLVED 5/2/16    Diabetes mellitus (HCC) Long time ago    Hyperlipidemia     Hypertension     Low back pain     Neck pain     Osteoarthritis     Pericarditis     Type 2 diabetes mellitus (HCC)      Past Surgical History:   Procedure Laterality Date    JOINT REPLACEMENT  10 years ago    KNEE SURGERY      NERVE BLOCK Bilateral 07/19/2019    Procedure: C3 C4 C5 C6 Medial Branch Block #1 ([id number]);  Surgeon: Demario Ordaz MD;  Location: [facility] MAIN OR;  Service: Pain Management     NERVE BLOCK Bilateral 08/02/2019    Procedure: C3 C4 C5 C6 Medial Branch Block #2 ([id number]);  Surgeon: Demario Ordaz MD;  Location: [facility] MAIN OR;  Service: Pain Management     RADIOFREQUENCY ABLATION Right 08/16/2019    Procedure: C3 C4 C5 C6 RADIOFREQUENCY ABLATION;  Surgeon: Demario Ordaz MD;  Location: [facility] MAIN OR;  Service: Pain Management     RADIOFREQUENCY ABLATION Left 08/30/2019    Procedure: C3 C4 C5 C6 RADIOFREQUENCY ABLATION;  Surgeon: Demario Ordaz MD;  Location: [facility] MAIN OR;  Service: Pain Management     REPLACEMENT TOTAL KNEE Right     WRIST SURGERY       No Known Allergies  Current Outpatient Medications on File Prior to Visit   Medication Sig Dispense Refill    aspirin 325 mg tablet Take 1 tablet (325 mg total) by mouth daily 90 tablet 3    atorvastatin (LIPITOR) 40 mg tablet TAKE ONE TABLET BY MOUTH EVERY DAY (GENERIC FOR LIPITOR) 30 tablet 5    fluticasone (FLONASE) 50 mcg/act nasal spray 1 spray  into each nostril daily 16 g 0    glipiZIDE (GLUCOTROL) 5 mg tablet Take 1 tablet (5 mg total) by mouth daily with breakfast 90 tablet 0    lisinopril (ZESTRIL) 20 mg tablet Take 1 tablet (20 mg total) by mouth daily 90 tablet 0    metFORMIN (GLUCOPHAGE) 1000 MG tablet TAKE ONE TABLET BY MOUTH TWICE A DAY WITH MEALS (GENERIC FOR GLUCOPHAGE) 60 tablet 5    glucose blood test strip  (Patient not taking: Reported on 8/29/2024)      naproxen (Naprosyn) 500 mg tablet Take 1 tablet (500 mg total) by mouth 2 (two) times a day with meals for 14 days 28 tablet 0    OneTouch Delica Lancets 33G MISC  (Patient not taking: Reported on 8/29/2024)       No current facility-administered medications on file prior to visit.     Social History     Socioeconomic History    Marital status: /Civil Union     Spouse name: Not on file    Number of children: Not on file    Years of education: Not on file    Highest education level: Not on file   Occupational History    Not on file   Tobacco Use    Smoking status: Never     Passive exposure: Past    Smokeless tobacco: Never   Vaping Use    Vaping status: Never Used   Substance and Sexual Activity    Alcohol use: No    Drug use: Never    Sexual activity: Not Currently     Partners: Female   Other Topics Concern    Not on file   Social History Narrative    DAILY [employer] CONSU[employer]ION      Social Determinants of Health     Financial Resource Strain: Not on file   Food Insecurity: Not on file   Transportation Needs: Not on file   Physical Activity: Not on file   Stress: Not on file   Social Connections: Not on file   Intimate Partner Violence: Not on file   Housing Stability: Not on file         I have reviewed the past medical, surgical, social and family history, medications and allergies as documented in the EMR.    Review of systems: ROS is negative other than that noted in the HPI.  Constitutional: Negative for fatigue and fever.   HENT: Negative for sore throat.    Respiratory: Negative  "for shortness of breath.    Cardiovascular: Negative for chest pain.   Gastrointestinal: Negative for abdominal pain.   Endocrine: Negative for cold intolerance and heat intolerance.   Genitourinary: Negative for flank pain.   Musculoskeletal: Negative for back pain.   Skin: Negative for rash.   Allergic/Immunologic: Negative for immunocompromised state.   Neurological: Negative for dizziness.   Psychiatric/Behavioral: Negative for agitation.      Physical Exam:    Blood pressure 145/88, pulse 79, height 5' 11\" (1.803 m), weight 98.9 kg (218 lb).    General/Constitutional: NAD, well developed, well nourished  HENT: Normocephalic, atraumatic  CV: Intact distal pulses, regular rate  Resp: No respiratory distress or labored breathing  Abdomen: soft, nondistended, non tender   Lymphatic: No lymphadenopathy palpated  Neuro: Alert and Oriented x 3, no focal deficits  Psych: Normal mood, normal affect  Skin: Warm, dry, no rashes, no erythema      BILATERAL Shoulder Exam:      Inspection: No ecchymosis, edema, or deformity. No visualized wounds or skin lesions   Palpation: No significant AC joint tenderness. Moderate bicipital groove tenderness  Active Motion:       FF: 160        ER: 45 right, 40 left with mild pain bilaterally       IR: T12 with mild pain  Pain bilaterally with shoulder abduction  Strength: 5/5 empty can with mild pain, 5/5 ER,  5/5 IR   Sensory - SILT in the Radial / Ulnar / Median / Axillary nerve distributions  Motor - AIN / PIN / Radial / Ulnar / Median / Axillary motor nerves in tact  Palpable Radial pulse  Cap refill <2secs in all digits      Imaging    I reviewed and interpreted X-rays of the left shoulder which show no acute osseous abnormalities or significant degenerative changes. Humerus is well-centered on the glenoid.     I reviewed and interpreted X-rays of the right shoulder which show no acute osseous abnormalities or significant degenerative changes. Tiny calcification adjacent to lateral " humeral head. Humerus is well-centered on the glenoid.     Large joint arthrocentesis: R subacromial bursa  Universal Protocol:  Consent: Verbal consent obtained.  Risks and benefits: risks, benefits and alternatives were discussed  Consent given by: patient  Timeout called at: 9/12/2024 11:14 AM.  Patient understanding: patient states understanding of the procedure being performed  Patient consent: the patient's understanding of the procedure matches consent given  Site marked: the operative site was marked  Radiology Images displayed and confirmed. If images not available, report reviewed: imaging studies available  Patient identity confirmed: verbally with patient  Supporting Documentation  Indications: pain   Procedure Details  Location: shoulder - R subacromial bursa  Preparation: Patient was prepped and draped in the usual sterile fashion  Needle size: 22 G  Approach: posterior  Medications administered: 4 mL ropivacaine 0.5 %; 60 mg ketorolac 60 mg/2 mL    Patient tolerance: patient tolerated the procedure well with no immediate complications  Dressing:  Sterile dressing applied

## 2024-09-25 ENCOUNTER — OFFICE VISIT (OUTPATIENT)
Dept: FAMILY MEDICINE CLINIC | Facility: CLINIC | Age: 64
End: 2024-09-25
Payer: COMMERCIAL

## 2024-09-25 VITALS
BODY MASS INDEX: 29.66 KG/M2 | TEMPERATURE: 97.8 F | HEIGHT: 72 IN | DIASTOLIC BLOOD PRESSURE: 80 MMHG | SYSTOLIC BLOOD PRESSURE: 130 MMHG | RESPIRATION RATE: 16 BRPM | HEART RATE: 70 BPM | WEIGHT: 219 LBS

## 2024-09-25 DIAGNOSIS — E11.9 TYPE 2 DIABETES MELLITUS WITHOUT COMPLICATION, WITHOUT LONG-TERM CURRENT USE OF INSULIN (HCC): ICD-10-CM

## 2024-09-25 DIAGNOSIS — Z12.5 SCREENING FOR PROSTATE CANCER: ICD-10-CM

## 2024-09-25 DIAGNOSIS — E78.5 HYPERLIPIDEMIA LDL GOAL <100: ICD-10-CM

## 2024-09-25 DIAGNOSIS — I10 PRIMARY HYPERTENSION: ICD-10-CM

## 2024-09-25 DIAGNOSIS — Z23 ENCOUNTER FOR IMMUNIZATION: ICD-10-CM

## 2024-09-25 DIAGNOSIS — Z00.00 WELL ADULT EXAM: Primary | ICD-10-CM

## 2024-09-25 LAB — SL AMB POCT HEMOGLOBIN AIC: 9.5 (ref ?–6.5)

## 2024-09-25 PROCEDURE — 99397 PER PM REEVAL EST PAT 65+ YR: CPT | Performed by: FAMILY MEDICINE

## 2024-09-25 PROCEDURE — 90472 IMMUNIZATION ADMIN EACH ADD: CPT

## 2024-09-25 PROCEDURE — 90677 PCV20 VACCINE IM: CPT

## 2024-09-25 PROCEDURE — 90471 IMMUNIZATION ADMIN: CPT

## 2024-09-25 PROCEDURE — 90662 IIV NO PRSV INCREASED AG IM: CPT

## 2024-09-25 PROCEDURE — 83036 HEMOGLOBIN GLYCOSYLATED A1C: CPT | Performed by: FAMILY MEDICINE

## 2024-09-25 NOTE — ASSESSMENT & PLAN NOTE
Uncontrolled. He has been noncompliant with his medications. Counseled on importance of compliance. F/u in 3 months.   Lab Results   Component Value Date    HGBA1C 9.5 (A) 09/25/2024       Orders:    POCT hemoglobin A1c    Hemoglobin A1C; Future

## 2024-09-25 NOTE — PROGRESS NOTES
Adult Annual Physical  Name: Jose Manuel Laurent Sr.      : 1960      MRN: [id number]  Encounter Provider: Marybeth Rojas MD  Encounter Date: 2024   Encounter department: [facility]    Assessment & Plan  Well adult exam         Type 2 diabetes mellitus without complication, without long-term current use of insulin (HCC)  Uncontrolled. He has been noncompliant with his medications. Counseled on importance of compliance. F/u in 3 months.   Lab Results   Component Value Date    HGBA1C 9.5 (A) 2024       Orders:    POCT hemoglobin A1c    Hemoglobin A1C; Future    Screening for prostate cancer    Orders:    PSA Total (Reflex To Free); Future    Hyperlipidemia LDL goal <100  Continue statin. Labs pending.          Primary hypertension  Controlled on lisinopril. Continue.          Encounter for immunization    Orders:    influenza vaccine, high-dose, PF 0.5 mL (Fluzone High Dose)    Pneumococcal Conjugate Vaccine 20-valent (Pcv20)    Immunizations and preventive care screenings were discussed with patient today. Appropriate education was printed on patient's after visit summary.           History of Present Illness     Adult Annual Physical:  Patient presents for annual physical.     Diet and Physical Activity:  - Diet/Nutrition: poor diet.  - Exercise: no formal exercise.    General Health:  - Sleep: sleeps well.  - Hearing: decreased hearing right ear.  - Vision: goes for regular eye exams.  - Dental: no dental visits for > 1 year.     Health:    - Urinary symptoms: none.     Review of Systems   Constitutional: Negative.    HENT: Negative.     Eyes: Negative.    Respiratory: Negative.     Cardiovascular: Negative.    Gastrointestinal: Negative.    Endocrine: Negative.    Genitourinary: Negative.    Musculoskeletal: Negative.    Neurological: Negative.    Hematological: Negative.    Psychiatric/Behavioral: Negative.           Objective     /80   Pulse 70   Temp 97.8 °F (36.6 °C)  (Tympanic)   Resp 16   Ht 6' (1.829 m)   Wt 99.3 kg (219 lb)   BMI 29.70 kg/m²     Physical Exam  Constitutional:       General: He is not in acute distress.     Appearance: Normal appearance. He is normal weight. He is not ill-appearing, toxic-appearing or diaphoretic.   HENT:      Head: Normocephalic and atraumatic.      Right Ear: Tympanic membrane, ear canal and external ear normal. There is no impacted cerumen.      Left Ear: Tympanic membrane, ear canal and external ear normal. There is no impacted cerumen.      Nose: Nose normal.      Mouth/Throat:      Mouth: Mucous membranes are moist.      Pharynx: Oropharynx is clear. No oropharyngeal exudate or posterior oropharyngeal erythema.   Eyes:      General: No scleral icterus.        Right eye: No discharge.         Left eye: No discharge.      Extraocular Movements: Extraocular movements intact.      Conjunctiva/sclera: Conjunctivae normal.      Pupils: Pupils are equal, round, and reactive to light.   Cardiovascular:      Rate and Rhythm: Normal rate and regular rhythm.      Pulses: Normal pulses. no weak pulses.           Dorsalis pedis pulses are 2+ on the right side and 2+ on the left side.      Heart sounds: Normal heart sounds. No murmur heard.     No friction rub. No gallop.   Pulmonary:      Effort: Pulmonary effort is normal. No respiratory distress.      Breath sounds: Normal breath sounds. No stridor. No wheezing, rhonchi or rales.   Chest:      Chest wall: No tenderness.   Abdominal:      General: Abdomen is flat. Bowel sounds are normal. There is no distension.      Palpations: Abdomen is soft. There is no mass.      Tenderness: There is no abdominal tenderness. There is no guarding or rebound.      Hernia: No hernia is present.   Musculoskeletal:         General: Normal range of motion.   Feet:      Right foot:      Skin integrity: No ulcer, skin breakdown, erythema, warmth, callus or dry skin.      Left foot:      Skin integrity: No ulcer,  skin breakdown, erythema, warmth, callus or dry skin.   Skin:     General: Skin is warm and dry.   Neurological:      General: No focal deficit present.      Mental Status: He is alert and oriented to person, place, and time.   Psychiatric:         Mood and Affect: Mood normal.         Behavior: Behavior normal.         Thought Content: Thought content normal.         Judgment: Judgment normal.     Patient's shoes and socks removed.    Right Foot/Ankle   Right Foot Inspection  Skin Exam: skin normal and skin intact. No dry skin, no warmth, no callus, no erythema, no maceration, no abnormal color, no pre-ulcer, no ulcer and no callus.     Toe Exam: ROM and strength within normal limits.     Sensory   Monofilament testing: intact    Vascular  Capillary refills: < 3 seconds  The right DP pulse is 2+.     Left Foot/Ankle  Left Foot Inspection  Skin Exam: skin normal and skin intact. No dry skin, no warmth, no erythema, no maceration, normal color, no pre-ulcer, no ulcer and no callus.     Toe Exam: ROM and strength within normal limits.     Sensory   Monofilament testing: intact    Vascular  Capillary refills: < 3 seconds  The left DP pulse is 2+.     Assign Risk Category  No deformity present  No loss of protective sensation  No weak pulses  Risk: 0

## 2024-10-15 DIAGNOSIS — E11.9 TYPE 2 DIABETES MELLITUS WITHOUT COMPLICATION, WITHOUT LONG-TERM CURRENT USE OF INSULIN (HCC): ICD-10-CM

## 2024-10-15 RX ORDER — GLIPIZIDE 5 MG/1
TABLET ORAL
Qty: 90 TABLET | Refills: 1 | Status: SHIPPED | OUTPATIENT
Start: 2024-10-15

## 2024-10-28 ENCOUNTER — PATIENT OUTREACH (OUTPATIENT)
Dept: CASE MANAGEMENT | Facility: OTHER | Age: 64
End: 2024-10-28

## 2024-10-28 DIAGNOSIS — E11.9 TYPE 2 DIABETES MELLITUS WITHOUT COMPLICATION, WITHOUT LONG-TERM CURRENT USE OF INSULIN (HCC): Primary | ICD-10-CM

## 2024-10-28 NOTE — PROGRESS NOTES
Outpatient Care Management Note:  CM referral received for assistance in managing DM due to elevated A1C.  Chart review completed.     History includes HTN, TIA, Dm with recent A1C of 9.5, arthritis, chronic pain and hyperlipidemia.      9/25/2024 PCP appointment-Per note, noncompliant with medications.  No changes made.  Follow up appointment scheduled in January.

## 2024-10-29 ENCOUNTER — OFFICE VISIT (OUTPATIENT)
Dept: URGENT CARE | Facility: CLINIC | Age: 64
End: 2024-10-29
Payer: COMMERCIAL

## 2024-10-29 ENCOUNTER — PATIENT OUTREACH (OUTPATIENT)
Dept: CASE MANAGEMENT | Facility: OTHER | Age: 64
End: 2024-10-29

## 2024-10-29 VITALS
SYSTOLIC BLOOD PRESSURE: 153 MMHG | TEMPERATURE: 96.1 F | HEART RATE: 78 BPM | DIASTOLIC BLOOD PRESSURE: 84 MMHG | BODY MASS INDEX: 30.2 KG/M2 | RESPIRATION RATE: 18 BRPM | WEIGHT: 223 LBS | HEIGHT: 72 IN | OXYGEN SATURATION: 97 %

## 2024-10-29 DIAGNOSIS — S39.012A STRAIN OF MUSCLE, FASCIA AND TENDON OF LOWER BACK, INITIAL ENCOUNTER: Primary | ICD-10-CM

## 2024-10-29 PROCEDURE — 99213 OFFICE O/P EST LOW 20 MIN: CPT | Performed by: FAMILY MEDICINE

## 2024-10-29 RX ORDER — NAPROXEN 500 MG/1
500 TABLET ORAL 2 TIMES DAILY WITH MEALS
Qty: 14 TABLET | Refills: 0 | Status: SHIPPED | OUTPATIENT
Start: 2024-10-29 | End: 2024-11-05

## 2024-10-29 NOTE — PROGRESS NOTES
"Outpatient Care Management Note:  Outreach call placed to Mr. Laurent.  Introduced myself and the role of RN FROILAN in assisting with management of DM through education and support.     Mr. Laurent shares that he does not like to take medication. 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 He also assists his bother who has cancer as needed.  States \"I know I am not taking care of myself.\"  Supportive listening provided.     Mr. Laurent states that he has been doing better taking his medications since his last visit with his PCP.  He will not take them if he does not eat to prevent hypoglycemia.   At this time, he is not interested in outreach.  Discussed the importance of caring for himself so he is able to continue caring for others.  Verbalized understanding. Advised him should he change his mind regarding outreach, a referral may be placed by his PCP.   "

## 2024-10-29 NOTE — PROGRESS NOTES
[facility]        NAME: Jose Manuel Laurent Sr. is a 64 y.o. male  : 1960    MRN: [id number]  DATE: 2024  TIME: 12:46 PM    Assessment and Plan   Strain of muscle, fascia and tendon of lower back, initial encounter [S39.012A]  1. Strain of muscle, fascia and tendon of lower back, initial encounter  naproxen (NAPROSYN) 500 mg tablet        Lower back pain likely secondary to muscle strain within the setting of possible osteoarthritis.  Naproxen prescribed and patient advised on using lidocaine patches.  Should also ice the lower back for at least 3 days prior to including warm compress.  Recommend regular stretching of his core and to lift with proper technique.    Patient Instructions     Follow up with PCP in 3-5 days.  Proceed to  ER if symptoms worsen.    If tests have been performed at [facility], our office will contact you with results if changes need to be made to the care plan discussed with you at the visit.  [facility]    Chief Complaint     Chief Complaint   Patient presents with    Back Pain     Lumbar pain and weakness has difficulty finding a comfortable position at rest. Is working doing heavy lifting         History of Present Illness       64-year-old male presents today with about a week of lower back pain.  Reports lifting heavy items while at work and also while caring for his wife who is recovering from a CVA.  Denies any direct trauma to the lower back.    Back Pain  Pertinent negatives include no abdominal pain, chest pain, fever, headaches, numbness or weakness.       Review of Systems   Review of Systems   Constitutional:  Negative for chills and fever.   Respiratory:  Negative for shortness of breath.    Cardiovascular:  Negative for chest pain.   Gastrointestinal:  Negative for abdominal pain.   Genitourinary:  Negative for enuresis.   Musculoskeletal:  Positive for back pain.   Neurological:  Negative for dizziness,  weakness, numbness and headaches.     Current Medications       Current Outpatient Medications:     aspirin 325 mg tablet, Take 1 tablet (325 mg total) by mouth daily, Disp: 90 tablet, Rfl: 3    atorvastatin (LIPITOR) 40 mg tablet, TAKE ONE TABLET BY MOUTH EVERY DAY (GENERIC FOR LIPITOR), Disp: 30 tablet, Rfl: 5    glipiZIDE (GLUCOTROL) 5 mg tablet, TAKE ONE TABLET BY MOUTH EVERY DAY WITH BREAKFAST (GENERIC FOR GLUCOTROL), Disp: 90 tablet, Rfl: 1    glucose blood test strip, , Disp: , Rfl:     lisinopril (ZESTRIL) 20 mg tablet, Take 1 tablet (20 mg total) by mouth daily, Disp: 90 tablet, Rfl: 0    metFORMIN (GLUCOPHAGE) 1000 MG tablet, TAKE ONE TABLET BY MOUTH TWICE A DAY WITH MEALS (GENERIC FOR GLUCOPHAGE), Disp: 60 tablet, Rfl: 5    naproxen (NAPROSYN) 500 mg tablet, Take 1 tablet (500 mg total) by mouth 2 (two) times a day with meals for 7 days, Disp: 14 tablet, Rfl: 0    OneTouch Delica Lancets 33G MISC, , Disp: , Rfl:     fluticasone (FLONASE) 50 mcg/act nasal spray, 1 spray into each nostril daily (Patient not taking: Reported on 10/29/2024), Disp: 16 g, Rfl: 0    naproxen (Naprosyn) 500 mg tablet, Take 1 tablet (500 mg total) by mouth 2 (two) times a day with meals for 14 days, Disp: 28 tablet, Rfl: 0    Current Allergies     Allergies as of 10/29/2024    (No Known Allergies)            The following portions of the patient's history were reviewed and updated as appropriate: allergies, current medications, past family history, past medical history, past social history, past surgical history and problem list.     Past Medical History:   Diagnosis Date    Benign essential hypertension     LAST ASSESSED 5/1/17; RESOLVED 5/1/17    Chronic pain disorder     Controlled insulin dependent diabetes mellitus     LAST ASSESSED 4/1/15; RESOLVED 5/2/16    Diabetes mellitus (HCC) Long time ago    Hyperlipidemia     Hypertension     Low back pain     Neck pain     Osteoarthritis     Pericarditis     Type 2 diabetes mellitus  (HCC)        Past Surgical History:   Procedure Laterality Date    JOINT REPLACEMENT  10 years ago    KNEE SURGERY      NERVE BLOCK Bilateral 07/19/2019    Procedure: C3 C4 C5 C6 Medial Branch Block #1 ([id number]);  Surgeon: Demario Ordaz MD;  Location: [facility] MAIN OR;  Service: Pain Management     NERVE BLOCK Bilateral 08/02/2019    Procedure: C3 C4 C5 C6 Medial Branch Block #2 ([id number]);  Surgeon: Demario Ordaz MD;  Location: [facility] MAIN OR;  Service: Pain Management     RADIOFREQUENCY ABLATION Right 08/16/2019    Procedure: C3 C4 C5 C6 RADIOFREQUENCY ABLATION;  Surgeon: Demario Ordaz MD;  Location: [facility] MAIN OR;  Service: Pain Management     RADIOFREQUENCY ABLATION Left 08/30/2019    Procedure: C3 C4 C5 C6 RADIOFREQUENCY ABLATION;  Surgeon: Demario Ordaz MD;  Location: [facility] MAIN OR;  Service: Pain Management     REPLACEMENT TOTAL KNEE Right     WRIST SURGERY         Family History   Problem Relation Age of Onset    Cancer Family     Cancer Mother     Cancer Father     No Known Problems Sister     Cancer Brother     No Known Problems Maternal Aunt     No Known Problems Maternal Uncle     No Known Problems Paternal Aunt     No Known Problems Paternal Uncle     No Known Problems Maternal Grandmother     No Known Problems Maternal Grandfather     No Known Problems Paternal Grandmother     No Known Problems Paternal Grandfather     Diabetes Brother          Medications have been verified.        Objective   /84   Pulse 78   Temp (!) 96.1 °F (35.6 °C)   Resp 18   Ht 6' (1.829 m)   Wt 101 kg (223 lb)   SpO2 97%   BMI 30.24 kg/m²   No LMP for male patient.       Physical Exam     Physical Exam  Vitals and nursing note reviewed.   Constitutional:       General: He is in acute distress.      Appearance: Normal appearance. He is normal weight. He is not ill-appearing, toxic-appearing or diaphoretic.   HENT:      Head: Normocephalic and atraumatic.   Eyes:      General:         Right  eye: No discharge.         Left eye: No discharge.      Conjunctiva/sclera: Conjunctivae normal.   Pulmonary:      Effort: Pulmonary effort is normal.   Musculoskeletal:         General: Tenderness (Lumbar spine and paraspinal muscles) present. No swelling, deformity or signs of injury.   Skin:     General: Skin is warm.      Findings: No erythema.   Neurological:      General: No focal deficit present.      Mental Status: He is alert and oriented to person, place, and time.   Psychiatric:         Mood and Affect: Mood normal.         Behavior: Behavior normal.         Thought Content: Thought content normal.         Judgment: Judgment normal.

## 2024-10-29 NOTE — LETTER
October 29, 2024     Patient: Jose Manuel Laurent Sr.   YOB: 1960   Date of Visit: 10/29/2024       To Whom It May Concern:    Jose Manuel Laurent was evaluated in my office on 10/29/2024.  Please excuse his absence.  He may return to work on 10/30/2024 if symptoms are improved.  If you have any questions or concerns, please don't hesitate to call.         Sincerely,        Maddi Deras MD

## 2024-10-30 ENCOUNTER — DOCUMENTATION (OUTPATIENT)
Dept: ADMINISTRATIVE | Facility: OTHER | Age: 64
End: 2024-10-30

## 2024-10-30 NOTE — PROGRESS NOTES
10/30/24 2:02 PM    Patient was called after the Urgent Care visit ; a message was left for the patient to return the call    Thank you.  Yulia Amaya MA  [facility]          Blood pressure elevated  Appointment department: [facility]  Appointment provider: Maddi Deras MD  Blood pressure   10/29/24 1244 153/84   10/29/24 1203 (!) 182/96

## 2024-11-13 DIAGNOSIS — I10 PRIMARY HYPERTENSION: ICD-10-CM

## 2024-11-14 RX ORDER — LISINOPRIL 20 MG/1
TABLET ORAL
Qty: 90 TABLET | Refills: 1 | Status: SHIPPED | OUTPATIENT
Start: 2024-11-14

## 2024-12-02 ENCOUNTER — VBI (OUTPATIENT)
Dept: ADMINISTRATIVE | Facility: OTHER | Age: 64
End: 2024-12-02

## 2024-12-02 NOTE — TELEPHONE ENCOUNTER
12/02/24 3:49 PM     Chart reviewed for Diabetic Eye Exam was/were submitted to the patient's insurance.     Depression screening and Statin therapy    Arleth Echevarria MA   [facility]

## 2025-01-04 ENCOUNTER — APPOINTMENT (OUTPATIENT)
Dept: LAB | Facility: HOSPITAL | Age: 65
End: 2025-01-04
Attending: FAMILY MEDICINE
Payer: COMMERCIAL

## 2025-01-04 DIAGNOSIS — Z11.59 NEED FOR HEPATITIS C SCREENING TEST: ICD-10-CM

## 2025-01-04 DIAGNOSIS — Z12.5 SCREENING FOR PROSTATE CANCER: ICD-10-CM

## 2025-01-04 DIAGNOSIS — Z13.29 SCREENING FOR THYROID DISORDER: ICD-10-CM

## 2025-01-04 DIAGNOSIS — E78.5 HYPERLIPIDEMIA LDL GOAL <100: ICD-10-CM

## 2025-01-04 DIAGNOSIS — E11.9 TYPE 2 DIABETES MELLITUS WITHOUT COMPLICATION, WITHOUT LONG-TERM CURRENT USE OF INSULIN (HCC): ICD-10-CM

## 2025-01-04 LAB
ALBUMIN SERPL BCG-MCNC: 4.4 G/DL (ref 3.5–5)
ALP SERPL-CCNC: 98 U/L (ref 34–104)
ALT SERPL W P-5'-P-CCNC: 12 U/L (ref 7–52)
ANION GAP SERPL CALCULATED.3IONS-SCNC: 1 MMOL/L (ref 4–13)
AST SERPL W P-5'-P-CCNC: 11 U/L (ref 13–39)
BILIRUB SERPL-MCNC: 0.74 MG/DL (ref 0.2–1)
BUN SERPL-MCNC: 13 MG/DL (ref 5–25)
CALCIUM SERPL-MCNC: 9.7 MG/DL (ref 8.4–10.2)
CHLORIDE SERPL-SCNC: 105 MMOL/L (ref 96–108)
CHOLEST SERPL-MCNC: 88 MG/DL (ref ?–200)
CO2 SERPL-SCNC: 29 MMOL/L (ref 21–32)
CREAT SERPL-MCNC: 0.83 MG/DL (ref 0.6–1.3)
EST. AVERAGE GLUCOSE BLD GHB EST-MCNC: 209 MG/DL
GFR SERPL CREATININE-BSD FRML MDRD: 92 ML/MIN/1.73SQ M
GLUCOSE P FAST SERPL-MCNC: 195 MG/DL (ref 65–99)
HBA1C MFR BLD: 8.9 %
HDLC SERPL-MCNC: 41 MG/DL
LDLC SERPL CALC-MCNC: 34 MG/DL (ref 0–100)
POTASSIUM SERPL-SCNC: 4.1 MMOL/L (ref 3.5–5.3)
PROT SERPL-MCNC: 6.9 G/DL (ref 6.4–8.4)
SODIUM SERPL-SCNC: 135 MMOL/L (ref 135–147)
TRIGL SERPL-MCNC: 63 MG/DL (ref ?–150)
TSH SERPL DL<=0.05 MIU/L-ACNC: 1.61 UIU/ML (ref 0.45–4.5)

## 2025-01-04 PROCEDURE — 36415 COLL VENOUS BLD VENIPUNCTURE: CPT

## 2025-01-04 PROCEDURE — 83036 HEMOGLOBIN GLYCOSYLATED A1C: CPT

## 2025-01-04 PROCEDURE — 80053 COMPREHEN METABOLIC PANEL: CPT

## 2025-01-04 PROCEDURE — 80061 LIPID PANEL: CPT

## 2025-01-04 PROCEDURE — 86803 HEPATITIS C AB TEST: CPT

## 2025-01-04 PROCEDURE — 84443 ASSAY THYROID STIM HORMONE: CPT

## 2025-01-04 PROCEDURE — 84153 ASSAY OF PSA TOTAL: CPT

## 2025-01-05 LAB — HCV AB SER QL: NORMAL

## 2025-01-06 LAB
Lab: NORMAL
MISCELLANEOUS LAB TEST RESULT: NORMAL

## 2025-01-07 ENCOUNTER — OFFICE VISIT (OUTPATIENT)
Dept: FAMILY MEDICINE CLINIC | Facility: CLINIC | Age: 65
End: 2025-01-07

## 2025-01-07 VITALS
HEART RATE: 84 BPM | BODY MASS INDEX: 29.58 KG/M2 | WEIGHT: 218.4 LBS | HEIGHT: 72 IN | RESPIRATION RATE: 18 BRPM | TEMPERATURE: 96.8 F | SYSTOLIC BLOOD PRESSURE: 130 MMHG | DIASTOLIC BLOOD PRESSURE: 70 MMHG

## 2025-01-07 DIAGNOSIS — I10 PRIMARY HYPERTENSION: Primary | ICD-10-CM

## 2025-01-07 DIAGNOSIS — E78.5 HYPERLIPIDEMIA LDL GOAL <100: ICD-10-CM

## 2025-01-07 DIAGNOSIS — E11.9 TYPE 2 DIABETES MELLITUS WITHOUT COMPLICATION, WITHOUT LONG-TERM CURRENT USE OF INSULIN (HCC): ICD-10-CM

## 2025-01-07 PROCEDURE — 99214 OFFICE O/P EST MOD 30 MIN: CPT | Performed by: FAMILY MEDICINE

## 2025-01-07 NOTE — PROGRESS NOTES
Name: Jose Manuel Laurent Sr.      : 1960      MRN: [id number]  Encounter Provider: Marybeth Rojas MD  Encounter Date: 2025   Encounter department: [facility]  :  Assessment & Plan  Primary hypertension  Stable on lisinopril. Continue.          Type 2 diabetes mellitus without complication, without long-term current use of insulin (HCC)  Improving, but remains uncontrolled. He has been eating poorly and skipping night time dose of metformin. Counseled on compliance.   Lab Results   Component Value Date    HGBA1C 8.9 (H) 2025            Hyperlipidemia LDL goal <100  Lab Results   Component Value Date    CHOLESTEROL 88 2025    CHOLESTEROL 89 2023    CHOLESTEROL 98 2022     Lab Results   Component Value Date    HDL 41 2025    HDL 39 (L) 2023    HDL 40 2022     Lab Results   Component Value Date    TRIG 63 2025    TRIG 65 2023    TRIG 79 2022     Lab Results   Component Value Date    NONHDLC 58 2022    NONHDLC 84 2017    NONHDLC 96 2016      Controlled on statin, continue.                 History of Present Illness     HPI  Jose Manuel Laurent Sr. is a 64 y.o. male who is here today for routine follow up visit.   Reports no acute issues/concerns in office today.   Has been compliant with current medications.    Review of Systems   Constitutional: Negative.    HENT: Negative.     Eyes: Negative.    Respiratory: Negative.     Cardiovascular: Negative.    Gastrointestinal: Negative.    Endocrine: Negative.    Genitourinary: Negative.    Musculoskeletal: Negative.    Neurological: Negative.    Hematological: Negative.    Psychiatric/Behavioral: Negative.         Objective   /70   Pulse 84   Temp (!) 96.8 °F (36 °C)   Resp 18   Ht 6' (1.829 m)   Wt 99.1 kg (218 lb 6.4 oz)   BMI 29.62 kg/m²      Physical Exam  Vitals and nursing note reviewed.   Constitutional:       General: He is not in acute distress.      Appearance: Normal appearance. He is well-developed.   HENT:      Head: Normocephalic and atraumatic.   Eyes:      Conjunctiva/sclera: Conjunctivae normal.   Cardiovascular:      Rate and Rhythm: Normal rate and regular rhythm.      Pulses: Normal pulses.      Heart sounds: Normal heart sounds. No murmur heard.  Pulmonary:      Effort: Pulmonary effort is normal. No respiratory distress.      Breath sounds: Normal breath sounds.   Musculoskeletal:         General: No swelling.      Cervical back: Neck supple.   Skin:     General: Skin is warm and dry.      Capillary Refill: Capillary refill takes less than 2 seconds.   Neurological:      Mental Status: He is alert.   Psychiatric:         Mood and Affect: Mood normal.

## 2025-01-07 NOTE — ASSESSMENT & PLAN NOTE
Lab Results   Component Value Date    CHOLESTEROL 88 01/04/2025    CHOLESTEROL 89 04/03/2023    CHOLESTEROL 98 11/29/2022     Lab Results   Component Value Date    HDL 41 01/04/2025    HDL 39 (L) 04/03/2023    HDL 40 11/29/2022     Lab Results   Component Value Date    TRIG 63 01/04/2025    TRIG 65 04/03/2023    TRIG 79 11/29/2022     Lab Results   Component Value Date    NONHDLC 58 11/29/2022    NONHDLC 84 04/25/2017    NONHDLC 96 06/06/2016      Controlled on statin, continue.

## 2025-02-06 ENCOUNTER — OFFICE VISIT (OUTPATIENT)
Dept: URGENT CARE | Facility: CLINIC | Age: 65
End: 2025-02-06
Payer: COMMERCIAL

## 2025-02-06 ENCOUNTER — APPOINTMENT (OUTPATIENT)
Dept: RADIOLOGY | Facility: CLINIC | Age: 65
End: 2025-02-06
Payer: COMMERCIAL

## 2025-02-06 VITALS
RESPIRATION RATE: 18 BRPM | TEMPERATURE: 97.9 F | HEIGHT: 72 IN | SYSTOLIC BLOOD PRESSURE: 136 MMHG | BODY MASS INDEX: 29.26 KG/M2 | OXYGEN SATURATION: 97 % | DIASTOLIC BLOOD PRESSURE: 86 MMHG | HEART RATE: 92 BPM | WEIGHT: 216 LBS

## 2025-02-06 DIAGNOSIS — J06.9 VIRAL URI WITH COUGH: ICD-10-CM

## 2025-02-06 DIAGNOSIS — J18.9 PNEUMONIA OF LEFT LOWER LOBE DUE TO INFECTIOUS ORGANISM: Primary | ICD-10-CM

## 2025-02-06 PROCEDURE — 71046 X-RAY EXAM CHEST 2 VIEWS: CPT

## 2025-02-06 PROCEDURE — 99214 OFFICE O/P EST MOD 30 MIN: CPT | Performed by: FAMILY MEDICINE

## 2025-02-06 RX ORDER — AZITHROMYCIN 250 MG/1
TABLET, FILM COATED ORAL
Qty: 6 TABLET | Refills: 0 | Status: SHIPPED | OUTPATIENT
Start: 2025-02-06 | End: 2025-02-10

## 2025-02-06 NOTE — LETTER
To whom it may concern,      Jose Manuel Laurent Sr. was seen in my office on 02/06/25. He may return to work on 2/10/2025. Thank you!      Sincerely,    Chester Junior, DO

## 2025-02-06 NOTE — PROGRESS NOTES
[facility]        NAME: Jose Manuel Laurent Sr. is a 64 y.o. male  : 1960    MRN: [id number]  DATE: 2025  TIME: 1:27 PM    Assessment and Plan   Pneumonia of left lower lobe due to infectious organism [J18.9]  1. Pneumonia of left lower lobe due to infectious organism  XR chest pa and lateral    azithromycin (ZITHROMAX) 250 mg tablet          Chest xray demonstrates a left lower lobe pneumonia. Antibiotics given today. Follow up with PCP in 3-5 days if no improvement. Proceed to ER if symptoms worsen.    Medical Decision Making     PROBLEM: 1 acute, uncomplicated illness or injury    DATA: Imaging independently reviewed today: yes, chest xr    RISK: Prescription drug management      Chief Complaint     Chief Complaint   Patient presents with   • Cough     Cough, congestion, runny nose, fever, since .  Taking cold medicine         History of Present Illness     Jose Manuel Laurent Sr. is a 64 y.o. male presenting to the office today for upper respiratory complaints. Symptoms have been present for 4 days, and include cough, congestion, sneezing and runny nose. He has tried OTC cough medicine for his symptoms, with no relief.  Sick contacts include: NA      Review of Systems     Review of Systems   Constitutional:  Positive for chills and fatigue. Negative for fever.   HENT:  Positive for congestion, postnasal drip, rhinorrhea and sore throat. Negative for ear discharge, ear pain, sinus pressure and sinus pain.    Eyes:  Negative for pain and discharge.   Respiratory:  Positive for cough. Negative for shortness of breath.    Cardiovascular:  Negative for chest pain and palpitations.   Gastrointestinal:  Negative for abdominal pain, diarrhea, nausea and vomiting.   Genitourinary:  Negative for difficulty urinating and dysuria.   Musculoskeletal:  Negative for arthralgias and myalgias.   Skin:  Negative for rash.   Neurological:  Negative for dizziness, syncope, light-headedness, numbness  and headaches.   Psychiatric/Behavioral:  Negative for agitation.    All other systems reviewed and are negative.      Current Medications       Current Outpatient Medications:   •  aspirin 325 mg tablet, Take 1 tablet (325 mg total) by mouth daily, Disp: 90 tablet, Rfl: 3  •  atorvastatin (LIPITOR) 40 mg tablet, TAKE ONE TABLET BY MOUTH EVERY DAY (GENERIC FOR LIPITOR), Disp: 30 tablet, Rfl: 5  •  azithromycin (ZITHROMAX) 250 mg tablet, Take 2 tablets today then 1 tablet daily x 4 days, Disp: 6 tablet, Rfl: 0  •  glipiZIDE (GLUCOTROL) 5 mg tablet, TAKE ONE TABLET BY MOUTH EVERY DAY WITH BREAKFAST (GENERIC FOR GLUCOTROL), Disp: 90 tablet, Rfl: 1  •  glucose blood test strip, , Disp: , Rfl:   •  lisinopril (ZESTRIL) 20 mg tablet, TAKE ONE TABLET BY MOUTH EVERY DAY (GENERIC FOR ZESTRIL), Disp: 90 tablet, Rfl: 1  •  metFORMIN (GLUCOPHAGE) 1000 MG tablet, TAKE ONE TABLET BY MOUTH TWICE A DAY WITH MEALS (GENERIC FOR GLUCOPHAGE), Disp: 60 tablet, Rfl: 5  •  OneTouch Delica Lancets 33G MISC, , Disp: , Rfl:     Current Allergies     Allergies as of 02/06/2025   • (No Known Allergies)            The following portions of the patient's history were reviewed and updated as appropriate: allergies, current medications, past family history, past medical history, past social history, past surgical history and problem list.     Past Medical History:   Diagnosis Date   • Benign essential hypertension     LAST ASSESSED 5/1/17; RESOLVED 5/1/17   • Chronic pain disorder    • Controlled insulin dependent diabetes mellitus     LAST ASSESSED 4/1/15; RESOLVED 5/2/16   • Diabetes mellitus (HCC) Long time ago   • Hyperlipidemia    • Hypertension    • Low back pain    • Neck pain    • Osteoarthritis    • Pericarditis    • Type 2 diabetes mellitus (HCC)        Past Surgical History:   Procedure Laterality Date   • JOINT REPLACEMENT  10 years ago   • KNEE SURGERY     • NERVE BLOCK Bilateral 07/19/2019    Procedure: C3 C4 C5 C6 Medial Branch  Block #1 ([id number]);  Surgeon: Demario Ordaz MD;  Location: [facility] MAIN OR;  Service: Pain Management    • NERVE BLOCK Bilateral 08/02/2019    Procedure: C3 C4 C5 C6 Medial Branch Block #2 ([id number]);  Surgeon: Demario Ordaz MD;  Location: [facility] MAIN OR;  Service: Pain Management    • RADIOFREQUENCY ABLATION Right 08/16/2019    Procedure: C3 C4 C5 C6 RADIOFREQUENCY ABLATION;  Surgeon: Demario Ordaz MD;  Location: [facility] MAIN OR;  Service: Pain Management    • RADIOFREQUENCY ABLATION Left 08/30/2019    Procedure: C3 C4 C5 C6 RADIOFREQUENCY ABLATION;  Surgeon: Demario Ordaz MD;  Location: [facility] MAIN OR;  Service: Pain Management    • REPLACEMENT TOTAL KNEE Right    • WRIST SURGERY         Family History   Problem Relation Age of Onset   • Cancer Family    • Cancer Mother    • Cancer Father    • No Known Problems Sister    • Cancer Brother    • No Known Problems Maternal Aunt    • No Known Problems Maternal Uncle    • No Known Problems Paternal Aunt    • No Known Problems Paternal Uncle    • No Known Problems Maternal Grandmother    • No Known Problems Maternal Grandfather    • No Known Problems Paternal Grandmother    • No Known Problems Paternal Grandfather    • Diabetes Brother        Medications have been verified.    Objective     /86   Pulse 92   Temp 97.9 °F (36.6 °C)   Resp 18   Ht 6' (1.829 m)   Wt 98 kg (216 lb)   SpO2 97%   BMI 29.29 kg/m²   No LMP for male patient.     Physical Exam     Physical Exam  Vitals reviewed.   Constitutional:       General: He is not in acute distress.     Appearance: Normal appearance. He is not ill-appearing.   HENT:      Head: Normocephalic and atraumatic.      Right Ear: Ear canal normal. A middle ear effusion is present.      Left Ear: Ear canal normal. A middle ear effusion is present.      Mouth/Throat:      Mouth: Mucous membranes are moist.      Pharynx: Postnasal drip present. No oropharyngeal exudate.      Tonsils: No tonsillar  exudate.   Eyes:      Extraocular Movements: Extraocular movements intact.      Conjunctiva/sclera: Conjunctivae normal.      Pupils: Pupils are equal, round, and reactive to light.   Cardiovascular:      Rate and Rhythm: Normal rate and regular rhythm.      Pulses: Normal pulses.      Heart sounds: Normal heart sounds. No murmur heard.  Pulmonary:      Effort: Pulmonary effort is normal. No respiratory distress.      Breath sounds: Normal breath sounds. No wheezing.   Musculoskeletal:      Cervical back: Normal range of motion and neck supple. No tenderness.   Skin:     General: Skin is warm.   Neurological:      General: No focal deficit present.      Mental Status: He is alert.   Psychiatric:         Mood and Affect: Mood normal.         Behavior: Behavior normal.         Judgment: Judgment normal.         Chest xray demonstrates a left lower lobe pneumonia

## 2025-02-18 ENCOUNTER — OFFICE VISIT (OUTPATIENT)
Dept: NEUROLOGY | Facility: CLINIC | Age: 65
End: 2025-02-18
Payer: COMMERCIAL

## 2025-02-18 VITALS
BODY MASS INDEX: 30.07 KG/M2 | HEART RATE: 70 BPM | HEIGHT: 72 IN | WEIGHT: 222 LBS | SYSTOLIC BLOOD PRESSURE: 120 MMHG | OXYGEN SATURATION: 98 % | DIASTOLIC BLOOD PRESSURE: 70 MMHG

## 2025-02-18 DIAGNOSIS — Z86.73 CHRONIC ARTERIAL ISCHEMIC STROKE: Primary | ICD-10-CM

## 2025-02-18 PROCEDURE — 99214 OFFICE O/P EST MOD 30 MIN: CPT | Performed by: PHYSICIAN ASSISTANT

## 2025-02-18 NOTE — ASSESSMENT & PLAN NOTE
Pt denies any symptoms to suggest new stroke.  Pt should continue Aspirin and atorvastatin for secondary stroke prevention.  Continue with good blood pressure control; I would recommend monitoring at home at least 3 times per week; Goal of <130/80; at goal  Continue with good cholesterol control; Goal LDL <70; at goal  Continue with good blood sugar control; Goal HgbA1c <7.0; above goal  Pt was encouraged to get regular exercise and follow a Mediterranean diet.  If pt has any new stroke-like symptoms including sudden painless loss of vision or double vision, difficulty speaking or swallowing, vertigo / room spinning that does not quickly resolve, or weakness / numbness affecting 1 side of the face or body he should proceed by ambulance to the nearest emergency room immediately.  Pt will follow-up in 12 months.

## 2025-02-18 NOTE — PROGRESS NOTES
Name: Jose Manuel Laurent Sr.      : 1960      MRN: [id number]  Encounter Provider: Rosa Murcia PA-C  Encounter Date: 2025   Encounter department: [facility]  :  Assessment & Plan  Chronic arterial ischemic stroke  Pt denies any symptoms to suggest new stroke.  Pt should continue Aspirin and atorvastatin for secondary stroke prevention.  Continue with good blood pressure control; I would recommend monitoring at home at least 3 times per week; Goal of <130/80; at goal  Continue with good cholesterol control; Goal LDL <70; at goal  Continue with good blood sugar control; Goal HgbA1c <7.0; above goal  Pt was encouraged to get regular exercise and follow a Mediterranean diet.  If pt has any new stroke-like symptoms including sudden painless loss of vision or double vision, difficulty speaking or swallowing, vertigo / room spinning that does not quickly resolve, or weakness / numbness affecting 1 side of the face or body he should proceed by ambulance to the nearest emergency room immediately.  Pt will follow-up in 12 months.             History of Present Illness     Jose Manuel Laurent Sr. is a 64 y.o. right handed male, with HTN, hyperlipidemia, DM type 2 and neck pain, who follows in the office due to a history of TIA and chronic lacunar infarct seen on imaging. He admits to not taking his medications as prescribed. 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 He does understand the risks of not taking his medications. He does not exercise outside of working full time and taking care of his wife. He admits to not getting regular exercise as well. His PCP ordered an ECHO but he has not had it done yet. He has not had any further events to suggest new stroke.     A1C 8.9  Lipid panel: TC 88. LDL 34.     CTA head and neck 3/22/24:   Chronic lacunar infarct left thalamus. No acute intracranial  abnormality.  No significant carotid or vertebral artery stenosis. No focal intracranial stenosis or aneurysm     Brain MRI 5/12/24:  1. No acute infarction, intracranial hemorrhage or mass effect.  2. Minimal, chronic microangiopathy.    I have personally reviewed the MA's review of systems and made changes as necessary.    Medical History Reviewed by provider this encounter:  Tobacco  Allergies  Meds  Problems  Med Hx  Surg Hx  Fam Hx     .     Objective   /70 (BP Location: Right arm, Patient Position: Sitting, Cuff Size: Adult)   Pulse 70   Ht 6' (1.829 m)   Wt 101 kg (222 lb)   SpO2 98%   BMI 30.11 kg/m²     Physical Exam  Vitals reviewed.   Constitutional:       Appearance: Normal appearance.   HENT:      Head: Normocephalic and atraumatic.      Nose: Nose normal.      Mouth/Throat:      Mouth: Mucous membranes are moist.   Eyes:      General: Lids are normal.      Extraocular Movements: Extraocular movements intact.      Conjunctiva/sclera: Conjunctivae normal.      Pupils: Pupils are equal, round, and reactive to light.   Pulmonary:      Effort: Pulmonary effort is normal.   Neurological:      Mental Status: He is alert.      Motor: Motor strength is normal.     Deep Tendon Reflexes: Reflexes are normal and symmetric.   Psychiatric:         Speech: Speech normal.       Neurological Exam  Mental Status  Alert. Oriented to person, place, time and situation. Recent and remote memory are intact. Speech is normal. Language is fluent with no aphasia. Attention and concentration are normal. Fund of knowledge is appropriate for level of education. Apraxia absent.    Cranial Nerves  CN III, IV, VI: Extraocular movements intact bilaterally. Normal lids and orbits bilaterally. Pupils equal round and reactive to light bilaterally.  CN V: Facial sensation is normal.  CN VII: Full and symmetric facial movement.  CN IX, X: Palate elevates symmetrically  CN XI: Shoulder shrug strength is normal.  CN XII:  Tongue midline without atrophy or fasciculations.    Motor   Strength is 5/5 throughout all four extremities.    Reflexes  Deep tendon reflexes are 2+ and symmetric in all four extremities.    Gait  Casual gait is normal including stance, stride, and arm swing.      Radiology Results Review : No pertinent imaging studies reviewed.    Administrative Statements   I have spent a total time of 30 minutes in caring for this patient on the day of the visit/encounter including Prognosis, Risks and benefits of tx options, Instructions for management, Patient and family education, Importance of tx compliance, Risk factor reductions, Impressions, Counseling / Coordination of care, Documenting in the medical record, Reviewing/placing orders in the medical record (including tests, medications, and/or procedures), and Obtaining or reviewing history  .

## 2025-02-18 NOTE — PROGRESS NOTES
.Review of Systems   Constitutional:  Negative for appetite change, fatigue and fever.   HENT: Negative.  Negative for hearing loss, tinnitus, trouble swallowing and voice change.    Eyes: Negative.  Negative for photophobia, pain and visual disturbance.   Respiratory: Negative.  Negative for shortness of breath.    Cardiovascular: Negative.  Negative for palpitations.   Gastrointestinal: Negative.  Negative for nausea and vomiting.   Endocrine: Negative.  Negative for cold intolerance.   Genitourinary: Negative.  Negative for dysuria, frequency and urgency.   Musculoskeletal:  Negative for back pain, gait problem, myalgias, neck pain and neck stiffness.   Skin: Negative.  Negative for rash.   Allergic/Immunologic: Negative.    Neurological: Negative.  Negative for dizziness, tremors, seizures, syncope, facial asymmetry, speech difficulty, weakness, light-headedness, numbness and headaches.   Hematological: Negative.  Does not bruise/bleed easily.   Psychiatric/Behavioral: Negative.  Negative for confusion, hallucinations and sleep disturbance.

## 2025-02-18 NOTE — PATIENT INSTRUCTIONS
Chronic arterial ischemic stroke  Pt denies any symptoms to suggest new stroke.  Pt should continue Aspirin and atorvastatin for secondary stroke prevention.  Continue with good blood pressure control; I would recommend monitoring at home at least 3 times per week; Goal of <130/80; at goal  Continue with good cholesterol control; Goal LDL <70; at goal  Continue with good blood sugar control; Goal HgbA1c <7.0; above goal  Pt was encouraged to get regular exercise and follow a Mediterranean diet.  If pt has any new stroke-like symptoms including sudden painless loss of vision or double vision, difficulty speaking or swallowing, vertigo / room spinning that does not quickly resolve, or weakness / numbness affecting 1 side of the face or body he should proceed by ambulance to the nearest emergency room immediately.  Pt will follow-up in 12 months.

## 2025-04-14 ENCOUNTER — TELEPHONE (OUTPATIENT)
Age: 65
End: 2025-04-14

## 2025-04-14 NOTE — TELEPHONE ENCOUNTER
The patient called to report that he was at the lab and no order was place for the patient for his appointment tomorrow. We spoke with Joann at the office and she report Dr. Rojas was not in today and the patient can attend for his visit tomorrow.

## 2025-04-15 ENCOUNTER — OFFICE VISIT (OUTPATIENT)
Dept: FAMILY MEDICINE CLINIC | Facility: CLINIC | Age: 65
End: 2025-04-15
Payer: COMMERCIAL

## 2025-04-15 VITALS
DIASTOLIC BLOOD PRESSURE: 86 MMHG | RESPIRATION RATE: 18 BRPM | SYSTOLIC BLOOD PRESSURE: 139 MMHG | HEART RATE: 68 BPM | BODY MASS INDEX: 30.1 KG/M2 | HEIGHT: 72 IN | WEIGHT: 222.2 LBS | TEMPERATURE: 95.5 F

## 2025-04-15 DIAGNOSIS — E78.5 HYPERLIPIDEMIA LDL GOAL <100: ICD-10-CM

## 2025-04-15 DIAGNOSIS — E11.9 TYPE 2 DIABETES MELLITUS WITHOUT COMPLICATION, WITHOUT LONG-TERM CURRENT USE OF INSULIN (HCC): ICD-10-CM

## 2025-04-15 DIAGNOSIS — I10 PRIMARY HYPERTENSION: Primary | ICD-10-CM

## 2025-04-15 LAB
CREAT UR-MCNC: 71.2 MG/DL
MICROALBUMIN UR-MCNC: <7 MG/L
SL AMB POCT HEMOGLOBIN AIC: 8.3 (ref ?–6.5)

## 2025-04-15 PROCEDURE — 99214 OFFICE O/P EST MOD 30 MIN: CPT | Performed by: FAMILY MEDICINE

## 2025-04-15 PROCEDURE — 83036 HEMOGLOBIN GLYCOSYLATED A1C: CPT | Performed by: FAMILY MEDICINE

## 2025-04-15 PROCEDURE — 82043 UR ALBUMIN QUANTITATIVE: CPT | Performed by: FAMILY MEDICINE

## 2025-04-15 PROCEDURE — 82570 ASSAY OF URINE CREATININE: CPT | Performed by: FAMILY MEDICINE

## 2025-04-15 NOTE — ASSESSMENT & PLAN NOTE
Lab Results   Component Value Date    CHOLESTEROL 88 01/04/2025    CHOLESTEROL 89 04/03/2023    CHOLESTEROL 98 11/29/2022     Lab Results   Component Value Date    HDL 41 01/04/2025    HDL 39 (L) 04/03/2023    HDL 40 11/29/2022     Lab Results   Component Value Date    TRIG 63 01/04/2025    TRIG 65 04/03/2023    TRIG 79 11/29/2022     Lab Results   Component Value Date    NONHDLC 58 11/29/2022    NONHDLC 84 04/25/2017    NONHDLC 96 06/06/2016    Continue statin.

## 2025-04-15 NOTE — PROGRESS NOTES
Name: Jose Manuel Laurent Sr.      : 1960      MRN: [id number]  Encounter Provider: Marybeth Rojas MD  Encounter Date: 4/15/2025   Encounter department: [facility]  :  Assessment & Plan  Type 2 diabetes mellitus without complication, without long-term current use of insulin (HCC)  He is not fully compliant with his medications. Forgets to take them. Counseled on compliance. Follow up in 3 months.   Lab Results   Component Value Date    HGBA1C 8.3 (A) 04/15/2025       Orders:    Albumin / creatinine urine ratio    POCT hemoglobin A1c    Primary hypertension  Stable.        Hyperlipidemia LDL goal <100  Lab Results   Component Value Date    CHOLESTEROL 88 2025    CHOLESTEROL 89 2023    CHOLESTEROL 98 2022     Lab Results   Component Value Date    HDL 41 2025    HDL 39 (L) 2023    HDL 40 2022     Lab Results   Component Value Date    TRIG 63 2025    TRIG 65 2023    TRIG 79 2022     Lab Results   Component Value Date    NONHDLC 58 2022    NONHDLC 84 2017    NONHDLC 96 2016    Continue statin.               History of Present Illness   HPI  Jose Manuel Laurent Sr. is a 64 y.o. male who is here today for routine follow up visit.   Reports no acute issues/concerns in office today.   Has been compliant with current medications.    Review of Systems   Constitutional: Negative.    HENT: Negative.     Eyes: Negative.    Respiratory: Negative.     Cardiovascular: Negative.    Gastrointestinal: Negative.    Endocrine: Negative.    Genitourinary: Negative.    Musculoskeletal: Negative.    Neurological: Negative.    Hematological: Negative.    Psychiatric/Behavioral: Negative.         Objective   /86   Pulse 68   Temp (!) 95.5 °F (35.3 °C)   Resp 18   Ht 6' (1.829 m)   Wt 101 kg (222 lb 3.2 oz)   BMI 30.14 kg/m²      Physical Exam  Vitals and nursing note reviewed.   Constitutional:       General: He is not in acute distress.      Appearance: Normal appearance. He is well-developed.   HENT:      Head: Normocephalic and atraumatic.   Eyes:      Conjunctiva/sclera: Conjunctivae normal.   Cardiovascular:      Rate and Rhythm: Normal rate and regular rhythm.      Pulses: Normal pulses.      Heart sounds: Normal heart sounds. No murmur heard.  Pulmonary:      Effort: Pulmonary effort is normal. No respiratory distress.      Breath sounds: Normal breath sounds.   Musculoskeletal:         General: No swelling.      Cervical back: Neck supple.   Skin:     General: Skin is warm and dry.      Capillary Refill: Capillary refill takes less than 2 seconds.   Neurological:      Mental Status: He is alert.   Psychiatric:         Mood and Affect: Mood normal.

## 2025-05-23 ENCOUNTER — TELEPHONE (OUTPATIENT)
Dept: FAMILY MEDICINE CLINIC | Facility: CLINIC | Age: 65
End: 2025-05-23

## 2025-05-23 NOTE — TELEPHONE ENCOUNTER
Completed form and placed for . Patient is already aware. States he will come pick it up sometimes next week.

## 2025-05-23 NOTE — TELEPHONE ENCOUNTER
Patient dropped off FMLA paperwork to be filled out. His wife is in the hospital and he is her caretaker.    Form placed in your folder.    Please call when ready.

## 2025-06-09 ENCOUNTER — APPOINTMENT (EMERGENCY)
Dept: RADIOLOGY | Facility: HOSPITAL | Age: 65
End: 2025-06-09
Payer: COMMERCIAL

## 2025-06-09 ENCOUNTER — HOSPITAL ENCOUNTER (EMERGENCY)
Facility: HOSPITAL | Age: 65
Discharge: HOME | End: 2025-06-09
Attending: EMERGENCY MEDICINE | Admitting: EMERGENCY MEDICINE
Payer: COMMERCIAL

## 2025-06-09 ENCOUNTER — OFFICE VISIT (OUTPATIENT)
Dept: URGENT CARE | Facility: CLINIC | Age: 65
End: 2025-06-09
Payer: COMMERCIAL

## 2025-06-09 VITALS
WEIGHT: 215 LBS | OXYGEN SATURATION: 98 % | HEART RATE: 55 BPM | HEIGHT: 72 IN | BODY MASS INDEX: 29.12 KG/M2 | TEMPERATURE: 98 F | SYSTOLIC BLOOD PRESSURE: 155 MMHG | DIASTOLIC BLOOD PRESSURE: 79 MMHG | RESPIRATION RATE: 18 BRPM

## 2025-06-09 VITALS
WEIGHT: 217 LBS | BODY MASS INDEX: 29.43 KG/M2 | TEMPERATURE: 97.5 F | HEART RATE: 66 BPM | RESPIRATION RATE: 16 BRPM | SYSTOLIC BLOOD PRESSURE: 138 MMHG | OXYGEN SATURATION: 98 % | DIASTOLIC BLOOD PRESSURE: 86 MMHG

## 2025-06-09 DIAGNOSIS — R42 DIZZINESS: ICD-10-CM

## 2025-06-09 DIAGNOSIS — R42 VERTIGO: Primary | ICD-10-CM

## 2025-06-09 DIAGNOSIS — R42 DIZZINESS: Primary | ICD-10-CM

## 2025-06-09 LAB
2HR DELTA HS TROPONIN: 0 NG/L
ALBUMIN SERPL BCG-MCNC: 4.5 G/DL (ref 3.5–5)
ALP SERPL-CCNC: 103 U/L (ref 34–104)
ALT SERPL W P-5'-P-CCNC: 11 U/L (ref 7–52)
ANION GAP SERPL CALCULATED.3IONS-SCNC: 7 MMOL/L (ref 4–13)
APTT PPP: 26 SECONDS (ref 23–34)
AST SERPL W P-5'-P-CCNC: 10 U/L (ref 13–39)
BASOPHILS # BLD AUTO: 0.03 THOUSANDS/ÂΜL (ref 0–0.1)
BASOPHILS NFR BLD AUTO: 1 % (ref 0–1)
BILIRUB SERPL-MCNC: 0.58 MG/DL (ref 0.2–1)
BILIRUB UR QL STRIP: NEGATIVE
BUN SERPL-MCNC: 11 MG/DL (ref 5–25)
CALCIUM SERPL-MCNC: 9.4 MG/DL (ref 8.4–10.2)
CARDIAC TROPONIN I PNL SERPL HS: 3 NG/L (ref ?–50)
CARDIAC TROPONIN I PNL SERPL HS: 3 NG/L (ref ?–50)
CHLORIDE SERPL-SCNC: 102 MMOL/L (ref 96–108)
CLARITY UR: CLEAR
CO2 SERPL-SCNC: 28 MMOL/L (ref 21–32)
COLOR UR: COLORLESS
CREAT SERPL-MCNC: 0.86 MG/DL (ref 0.6–1.3)
EOSINOPHIL # BLD AUTO: 0.07 THOUSAND/ÂΜL (ref 0–0.61)
EOSINOPHIL NFR BLD AUTO: 1 % (ref 0–6)
ERYTHROCYTE [DISTWIDTH] IN BLOOD BY AUTOMATED COUNT: 12.1 % (ref 11.6–15.1)
GFR SERPL CREATININE-BSD FRML MDRD: 90 ML/MIN/1.73SQ M
GLUCOSE SERPL-MCNC: 206 MG/DL (ref 65–140)
GLUCOSE SERPL-MCNC: 231 MG/DL (ref 65–140)
GLUCOSE UR STRIP-MCNC: ABNORMAL MG/DL
HCT VFR BLD AUTO: 44.4 % (ref 36.5–49.3)
HGB BLD-MCNC: 14.8 G/DL (ref 12–17)
HGB UR QL STRIP.AUTO: NEGATIVE
IMM GRANULOCYTES # BLD AUTO: 0.01 THOUSAND/UL (ref 0–0.2)
IMM GRANULOCYTES NFR BLD AUTO: 0 % (ref 0–2)
INR PPP: 0.96 (ref 0.85–1.19)
KETONES UR STRIP-MCNC: NEGATIVE MG/DL
LEUKOCYTE ESTERASE UR QL STRIP: NEGATIVE
LYMPHOCYTES # BLD AUTO: 1.63 THOUSANDS/ÂΜL (ref 0.6–4.47)
LYMPHOCYTES NFR BLD AUTO: 29 % (ref 14–44)
MCH RBC QN AUTO: 31 PG (ref 26.8–34.3)
MCHC RBC AUTO-ENTMCNC: 33.3 G/DL (ref 31.4–37.4)
MCV RBC AUTO: 93 FL (ref 82–98)
MONOCYTES # BLD AUTO: 0.53 THOUSAND/ÂΜL (ref 0.17–1.22)
MONOCYTES NFR BLD AUTO: 10 % (ref 4–12)
NEUTROPHILS # BLD AUTO: 3.33 THOUSANDS/ÂΜL (ref 1.85–7.62)
NEUTS SEG NFR BLD AUTO: 59 % (ref 43–75)
NITRITE UR QL STRIP: NEGATIVE
NRBC BLD AUTO-RTO: 0 /100 WBCS
PH UR STRIP.AUTO: 5.5 [PH]
PLATELET # BLD AUTO: 214 THOUSANDS/UL (ref 149–390)
PMV BLD AUTO: 10.5 FL (ref 8.9–12.7)
POTASSIUM SERPL-SCNC: 3.9 MMOL/L (ref 3.5–5.3)
PROT SERPL-MCNC: 7 G/DL (ref 6.4–8.4)
PROT UR STRIP-MCNC: NEGATIVE MG/DL
PROTHROMBIN TIME: 13.3 SECONDS (ref 12.3–15)
RBC # BLD AUTO: 4.77 MILLION/UL (ref 3.88–5.62)
SL AMB POCT GLUCOSE BLD: 214
SODIUM SERPL-SCNC: 137 MMOL/L (ref 135–147)
SP GR UR STRIP.AUTO: <1.005 (ref 1–1.03)
UROBILINOGEN UR STRIP-ACNC: <2 MG/DL
WBC # BLD AUTO: 5.6 THOUSAND/UL (ref 4.31–10.16)

## 2025-06-09 PROCEDURE — 93005 ELECTROCARDIOGRAM TRACING: CPT

## 2025-06-09 PROCEDURE — 36415 COLL VENOUS BLD VENIPUNCTURE: CPT | Performed by: EMERGENCY MEDICINE

## 2025-06-09 PROCEDURE — 85025 COMPLETE CBC W/AUTO DIFF WBC: CPT | Performed by: EMERGENCY MEDICINE

## 2025-06-09 PROCEDURE — 85610 PROTHROMBIN TIME: CPT | Performed by: EMERGENCY MEDICINE

## 2025-06-09 PROCEDURE — 84484 ASSAY OF TROPONIN QUANT: CPT | Performed by: EMERGENCY MEDICINE

## 2025-06-09 PROCEDURE — 70496 CT ANGIOGRAPHY HEAD: CPT

## 2025-06-09 PROCEDURE — 99284 EMERGENCY DEPT VISIT MOD MDM: CPT

## 2025-06-09 PROCEDURE — 85730 THROMBOPLASTIN TIME PARTIAL: CPT | Performed by: EMERGENCY MEDICINE

## 2025-06-09 PROCEDURE — 70498 CT ANGIOGRAPHY NECK: CPT

## 2025-06-09 PROCEDURE — 96361 HYDRATE IV INFUSION ADD-ON: CPT

## 2025-06-09 PROCEDURE — 99284 EMERGENCY DEPT VISIT MOD MDM: CPT | Performed by: EMERGENCY MEDICINE

## 2025-06-09 PROCEDURE — 96360 HYDRATION IV INFUSION INIT: CPT

## 2025-06-09 PROCEDURE — 82948 REAGENT STRIP/BLOOD GLUCOSE: CPT

## 2025-06-09 PROCEDURE — 80053 COMPREHEN METABOLIC PANEL: CPT | Performed by: EMERGENCY MEDICINE

## 2025-06-09 PROCEDURE — 99214 OFFICE O/P EST MOD 30 MIN: CPT | Performed by: PHYSICIAN ASSISTANT

## 2025-06-09 PROCEDURE — 82948 REAGENT STRIP/BLOOD GLUCOSE: CPT | Performed by: PHYSICIAN ASSISTANT

## 2025-06-09 PROCEDURE — 71045 X-RAY EXAM CHEST 1 VIEW: CPT

## 2025-06-09 RX ORDER — MECLIZINE HYDROCHLORIDE 25 MG/1
25 TABLET ORAL 3 TIMES DAILY PRN
Qty: 30 TABLET | Refills: 0 | Status: SHIPPED | OUTPATIENT
Start: 2025-06-09

## 2025-06-09 RX ORDER — MECLIZINE HYDROCHLORIDE 25 MG/1
25 TABLET ORAL ONCE
Status: COMPLETED | OUTPATIENT
Start: 2025-06-09 | End: 2025-06-09

## 2025-06-09 RX ADMIN — SODIUM CHLORIDE 1000 ML: 0.9 INJECTION, SOLUTION INTRAVENOUS at 11:59

## 2025-06-09 RX ADMIN — MECLIZINE HYDROCHLORIDE 25 MG: 25 TABLET ORAL at 12:29

## 2025-06-09 RX ADMIN — IOHEXOL 85 ML: 350 INJECTION, SOLUTION INTRAVENOUS at 12:51

## 2025-06-09 NOTE — ED PROVIDER NOTES
Time reflects when diagnosis was documented in both MDM as applicable and the Disposition within this note       Time User Action Codes Description Comment    6/9/2025  2:32 PM BahenaGavino hill Add [R42] Dizziness     6/9/2025  2:32 PM BahenaGavino hill Add [R42] Vertigo     6/9/2025  2:33 PM Gavino Bahena Modify [R42] Dizziness     6/9/2025  2:33 PM Gavino Bahena Modify [R42] Vertigo           ED Disposition       ED Disposition   Discharge    Condition   Stable    Date/Time   Mon Jun 9, 2025  2:32 PM    Comment   Jose Manuel Laurent Sr. discharge to home/self care.                   Assessment & Plan       Medical Decision Making  Patient has a history of vertigo.  States has been under undue stress recently and had a recent cold.  Patient has had vertiginous dizziness for the last 3 weeks.  Was seen at an urgent care center at the behest of his wife and sent to the ED for evaluation.  No neurological changes.    Case was discussed with neurologist.  Patient improved with Antivert and wants to go home.  Will follow-up outpatient    Amount and/or Complexity of Data Reviewed  Labs: ordered.  Radiology: ordered.    Risk  Prescription drug management.             Medications   sodium chloride 0.9 % bolus 1,000 mL (0 mL Intravenous Stopped 6/9/25 1443)   meclizine (ANTIVERT) tablet 25 mg (25 mg Oral Given 6/9/25 1229)   iohexol (OMNIPAQUE) 350 MG/ML injection (MULTI-DOSE) 85 mL (85 mL Intravenous Given 6/9/25 1251)       ED Risk Strat Scores                    (ISAR) Identification of Seniors at Risk  Before the illness or injury that brought you to the Emergency, did you need someone to help you on a regular basis?: 0  In the last 24 hours, have you needed more help than usual?: 0  Have you been hospitalized for one or more nights during the past 6 months?: 0  In general, do you see well?: 0  In general, do you have serious problems with your memory?: 0  Do you take more than three different medications every day?:  "1  ISAR Score: 1                                History of Present Illness       Chief Complaint   Patient presents with    Dizziness     Pt reports being sent in by urgent care after having \"vertigo\" for 3 weeks.  Pt reports intermittent feeling of room spinning when moving head.         Past Medical History[1]   Past Surgical History[2]   Family History[3]   Social History[4]   E-Cigarette/Vaping    E-Cigarette Use Never User       E-Cigarette/Vaping Substances    Nicotine No     THC No     CBD No     Flavoring No     Other No     Unknown No       I have reviewed and agree with the history as documented.     Patient has a history of vertigo in the past and has responded well to histamine treatments.  He states that recently he had a upper respiratory infection which he did not seek medical attention for.  He also states he has been under undue stress recently.  Patient has been complaining of room spinning dizziness which has been present for 3 weeks.  The dizziness is worse with position change and turning his head.  Not associated with visual changes nausea vomiting headache weakness in any extremity.  No gait problems.  Patient was seen at an urgent care center at the behest of his wife and referred to the ED for emergent CAT scan         Review of Systems   Constitutional:  Negative for chills and fever.   HENT:  Negative for congestion, sneezing and sore throat.    Eyes:  Negative for visual disturbance.   Respiratory:  Negative for chest tightness and shortness of breath.    Cardiovascular:  Negative for chest pain and leg swelling.   Gastrointestinal:  Negative for abdominal pain and vomiting.   Musculoskeletal:  Negative for arthralgias, back pain and gait problem.   Skin:  Negative for rash.   Neurological:  Positive for dizziness. Negative for syncope, facial asymmetry, speech difficulty, weakness, light-headedness, numbness and headaches.   Hematological:  Does not bruise/bleed easily. "   Psychiatric/Behavioral:  Negative for confusion.    All other systems reviewed and are negative.          Objective       ED Triage Vitals   Temperature Pulse Blood Pressure Respirations SpO2 Patient Position - Orthostatic VS   06/09/25 1148 06/09/25 1131 06/09/25 1131 06/09/25 1131 06/09/25 1131 06/09/25 1131   98 °F (36.7 °C) 62 (!) 184/92 18 97 % Lying      Temp Source Heart Rate Source BP Location FiO2 (%) Pain Score    06/09/25 1131 06/09/25 1131 06/09/25 1131 -- --    Tympanic Monitor Right arm        Vitals      Date and Time Temp Pulse SpO2 Resp BP Pain Score FACES Pain Rating User   06/09/25 1445 -- -- 98 % 18 155/79 -- -- JMW   06/09/25 1414 98 °F (36.7 °C) 55 96 % -- 171/83 -- -- FO   06/09/25 1206 -- 62 98 % 16 162/68 -- -- RG   06/09/25 1148 98 °F (36.7 °C) -- -- -- -- -- -- STOUT   06/09/25 1136 -- 72 98 % -- 184/92 -- -- RG   06/09/25 1131 -- 62 97 % 18 184/92 -- -- STOUT            Physical Exam  Vitals and nursing note reviewed.   Constitutional:       Appearance: Normal appearance.   HENT:      Head: Normocephalic and atraumatic.      Right Ear: External ear normal.      Left Ear: External ear normal.      Nose: Nose normal.      Mouth/Throat:      Mouth: Mucous membranes are moist.      Pharynx: Oropharynx is clear.     Eyes:      Extraocular Movements: Extraocular movements intact.      Conjunctiva/sclera: Conjunctivae normal.      Pupils: Pupils are equal, round, and reactive to light.       Cardiovascular:      Rate and Rhythm: Normal rate and regular rhythm.      Pulses: Normal pulses.   Pulmonary:      Effort: Pulmonary effort is normal.      Breath sounds: Normal breath sounds.   Abdominal:      General: Abdomen is flat.      Palpations: Abdomen is soft.      Tenderness: There is no abdominal tenderness.     Musculoskeletal:         General: Normal range of motion.      Cervical back: Normal range of motion and neck supple. No tenderness.     Skin:     General: Skin is warm and dry.       Capillary Refill: Capillary refill takes less than 2 seconds.     Neurological:      General: No focal deficit present.      Mental Status: He is alert and oriented to person, place, and time.      Cranial Nerves: No cranial nerve deficit.      Sensory: No sensory deficit.      Motor: No weakness.      Coordination: Coordination normal.     Psychiatric:         Mood and Affect: Mood normal.         Behavior: Behavior normal.         Results Reviewed       Procedure Component Value Units Date/Time    HS Troponin I 2hr [[id number]]  (Normal) Collected: 06/09/25 1405    Lab Status: Final result Specimen: Blood from Arm, Right Updated: 06/09/25 1434     hs TnI 2hr 3 ng/L      Delta 2hr hsTnI 0 ng/L     UA (URINE) with reflex to Scope [[id number]]  (Abnormal) Collected: 06/09/25 1405    Lab Status: Final result Specimen: Urine, Clean Catch Updated: 06/09/25 1417     Color, UA Colorless     Clarity, UA Clear     Specific Gravity, UA <1.005     pH, UA 5.5     Leukocytes, UA Negative     Nitrite, UA Negative     Protein, UA Negative mg/dl      Glucose, UA 1000 (1%) mg/dl      Ketones, UA Negative mg/dl      Urobilinogen, UA <2.0 mg/dl      Bilirubin, UA Negative     Occult Blood, UA Negative    HS Troponin 0hr (reflex protocol) [[id number]]  (Normal) Collected: 06/09/25 1148    Lab Status: Final result Specimen: Blood from Arm, Right Updated: 06/09/25 1229     hs TnI 0hr 3 ng/L     Comprehensive metabolic panel [[id number]]  (Abnormal) Collected: 06/09/25 1148    Lab Status: Final result Specimen: Blood from Arm, Right Updated: 06/09/25 1215     Sodium 137 mmol/L      Potassium 3.9 mmol/L      Chloride 102 mmol/L      CO2 28 mmol/L      ANION GAP 7 mmol/L      BUN 11 mg/dL      Creatinine 0.86 mg/dL      Glucose 231 mg/dL      Calcium 9.4 mg/dL      AST 10 U/L      ALT 11 U/L      Alkaline Phosphatase 103 U/L      Total Protein 7.0 g/dL      Albumin 4.5 g/dL      Total Bilirubin 0.58 mg/dL      eGFR 90 ml/min/1.73sq m      Narrative:      National Kid[employer] Foundation guidelines for Chronic Kidney Disease (CKD):     Stage 1 with normal or high GFR (GFR > 90 mL/min/1.73 square meters)    Stage 2 Mild CKD (GFR = 60-89 mL/min/1.73 square meters)    Stage 3A Moderate CKD (GFR = 45-59 mL/min/1.73 square meters)    Stage 3B Moderate CKD (GFR = 30-44 mL/min/1.73 square meters)    Stage 4 Severe CKD (GFR = 15-29 mL/min/1.73 square meters)    Stage 5 End Stage CKD (GFR <15 mL/min/1.73 square meters)  Note: GFR calculation is accurate only with a steady state creatinine    Protime-INR [[id number]]  (Normal) Collected: 06/09/25 1148    Lab Status: Final result Specimen: Blood from Arm, Right Updated: 06/09/25 1213     Protime 13.3 seconds      INR 0.96    Narrative:      INR Therapeutic Range    Indication                                             INR Range      Atrial Fibrillation                                               2.0-3.0  Hypercoagulable State                                    2.0.2.3  Left Ventricular Asist Device                            2.0-3.0  Mechanical Heart Valve                                  -    Aortic(with afib, MI, embolism, HF, LA enlargement,    and/or coagulopathy)                                     2.0-3.0 (2.5-3.5)     Mitral                                                             2.5-3.5  Prosthetic/Bioprosthetic Heart Valve               2.0-3.0  Venous thromboembolism (VTE: VT, PE        2.0-3.0    APTT [[id number]]  (Normal) Collected: 06/09/25 1148    Lab Status: Final result Specimen: Blood from Arm, Right Updated: 06/09/25 1213     PTT 26 seconds     Fingerstick Glucose (POCT) [[id number]]  (Abnormal) Collected: 06/09/25 1159    Lab Status: Final result Specimen: Blood Updated: 06/09/25 1200     POC Glucose 206 mg/dl     CBC and differential [[id number]] Collected: 06/09/25 1148    Lab Status: Final result Specimen: Blood from Arm, Right Updated: 06/09/25 1154     WBC 5.60 Thousand/uL      RBC  4.77 Million/uL      Hemoglobin 14.8 g/dL      Hematocrit 44.4 %      MCV 93 fL      MCH 31.0 pg      MCHC 33.3 g/dL      RDW 12.1 %      MPV 10.5 fL      Platelets 214 Thousands/uL      nRBC 0 /100 WBCs      Segmented % 59 %      Immature Grans % 0 %      Lymphocytes % 29 %      Monocytes % 10 %      Eosinophils Relative 1 %      Basophils Relative 1 %      Absolute Neutrophils 3.33 Thousands/µL      Absolute Immature Grans 0.01 Thousand/uL      Absolute Lymphocytes 1.63 Thousands/µL      Absolute Monocytes 0.53 Thousand/µL      Eosinophils Absolute 0.07 Thousand/µL      Basophils Absolute 0.03 Thousands/µL             CTA head and neck with and without contrast   Final Interpretation by Jovanni Greer DO (06/09 1328)      CT Brain:  No acute intracranial abnormality.      CT Angiography:  Unremarkable CTA neck and brain.      Elevation of the left hemidiaphragm.            Workstation performed: [id number]         XR chest 1 view portable   Final Interpretation by Ian Hudson MD (06/09 1448)      No acute cardiopulmonary disease. Chronically elevated left hemidiaphragm, with left basilar atelectasis.            Workstation performed: [id number]             ECG 12 Lead Documentation Only    Date/Time: 6/9/2025 11:50 AM    Performed by: Gavino Bahena MD  Authorized by: Gavino Bahena MD    Indications / Diagnosis:  Dizziness  ECG reviewed by me, the ED Provider: yes    Patient location:  ED  Interpretation:     Interpretation: normal    Rate:     ECG rate:  54    ECG rate assessment: bradycardic    Rhythm:     Rhythm: sinus bradycardia    Ectopy:     Ectopy: none    QRS:     QRS axis:  Normal    QRS intervals:  Normal  Conduction:     Conduction: normal    ST segments:     ST segments:  Normal  T waves:     T waves: normal        ED Medication and Procedure Management   Prior to Admission Medications   Prescriptions Last Dose Informant Patient Reported? Taking?   OneTouch Delica Lancets 33G  MISC   Yes No   Patient not taking: Reported on 6/9/2025   aspirin 325 mg tablet   No No   Sig: Take 1 tablet (325 mg total) by mouth daily   atorvastatin (LIPITOR) 40 mg tablet   No No   Sig: TAKE ONE TABLET BY MOUTH EVERY DAY (GENERIC FOR LIPITOR)   glipiZIDE (GLUCOTROL) 5 mg tablet   No No   Sig: TAKE ONE TABLET BY MOUTH EVERY DAY WITH BREAKFAST (GENERIC FOR GLUCOTROL)   glucose blood test strip   Yes No   Patient not taking: Reported on 6/9/2025   lisinopril (ZESTRIL) 20 mg tablet   No No   Sig: TAKE ONE TABLET BY MOUTH EVERY DAY (GENERIC FOR ZESTRIL)   metFORMIN (GLUCOPHAGE) 1000 MG tablet   No No   Sig: TAKE ONE TABLET BY MOUTH TWICE A DAY WITH MEALS (GENERIC FOR GLUCOPHAGE)      Facility-Administered Medications: None     Discharge Medication List as of 6/9/2025  2:34 PM        START taking these medications    Details   meclizine (ANTIVERT) 25 mg tablet Take 1 tablet (25 mg total) by mouth 3 (three) times a day as needed for dizziness, Starting Mon 6/9/2025, Normal           CONTINUE these medications which have NOT CHANGED    Details   aspirin 325 mg tablet Take 1 tablet (325 mg total) by mouth daily, Starting Tue 8/13/2024, Normal      atorvastatin (LIPITOR) 40 mg tablet TAKE ONE TABLET BY MOUTH EVERY DAY (GENERIC FOR LIPITOR), Normal      glipiZIDE (GLUCOTROL) 5 mg tablet TAKE ONE TABLET BY MOUTH EVERY DAY WITH BREAKFAST (GENERIC FOR GLUCOTROL), Normal      glucose blood test strip Historical Med      lisinopril (ZESTRIL) 20 mg tablet TAKE ONE TABLET BY MOUTH EVERY DAY (GENERIC FOR ZESTRIL), Normal      metFORMIN (GLUCOPHAGE) 1000 MG tablet TAKE ONE TABLET BY MOUTH TWICE A DAY WITH MEALS (GENERIC FOR GLUCOPHAGE), Normal      OneTouch Delica Lancets 33G MISC Historical Med           No discharge procedures on file.  ED SEPSIS DOCUMENTATION   Time reflects when diagnosis was documented in both MDM as applicable and the Disposition within this note       Time User Action Codes Description Comment    6/9/2025   2:32 PM Gavino Bahena Add [R42] Dizziness     6/9/2025  2:32 PM BahenaGavino hill Add [R42] Vertigo     6/9/2025  2:33 PM Gavino Bahena A Modify [R42] Dizziness     6/9/2025  2:33 PM Gavino Bahena A Modify [R42] Vertigo                      [1]   Past Medical History:  Diagnosis Date    Benign essential hypertension     LAST ASSESSED 5/1/17; RESOLVED 5/1/17    Chronic pain disorder     Controlled insulin dependent diabetes mellitus     LAST ASSESSED 4/1/15; RESOLVED 5/2/16    Diabetes mellitus (HCC) Long time ago    Hyperlipidemia     Hypertension     Low back pain     Neck pain     Osteoarthritis     Pericarditis     Type 2 diabetes mellitus (HCC)    [2]   Past Surgical History:  Procedure Laterality Date    JOINT REPLACEMENT  10 years ago    KNEE SURGERY      NERVE BLOCK Bilateral 07/19/2019    Procedure: C3 C4 C5 C6 Medial Branch Block #1 ([id number]);  Surgeon: Demario Ordaz MD;  Location: [facility] MAIN OR;  Service: Pain Management     NERVE BLOCK Bilateral 08/02/2019    Procedure: C3 C4 C5 C6 Medial Branch Block #2 ([id number]);  Surgeon: Demario Ordaz MD;  Location: [facility] MAIN OR;  Service: Pain Management     RADIOFREQUENCY ABLATION Right 08/16/2019    Procedure: C3 C4 C5 C6 RADIOFREQUENCY ABLATION;  Surgeon: Demario Ordaz MD;  Location: [facility] MAIN OR;  Service: Pain Management     RADIOFREQUENCY ABLATION Left 08/30/2019    Procedure: C3 C4 C5 C6 RADIOFREQUENCY ABLATION;  Surgeon: Demario Ordaz MD;  Location: [facility] MAIN OR;  Service: Pain Management     REPLACEMENT TOTAL KNEE Right     WRIST SURGERY     [3]   Family History  Problem Relation Name Age of Onset    Cancer Family      Cancer Mother Ling mayo     Cancer Father Jose Manuel Mayo     No Known Problems Sister      Cancer Brother Ramakrishna mayo     No Known Problems Maternal Aunt      No Known Problems Maternal Uncle      No Known Problems Paternal Aunt      No Known Problems Paternal Uncle      No Known Problems  Maternal Grandmother      No Known Problems Maternal Grandfather      No Known Problems Paternal Grandmother      No Known Problems Paternal Grandfather      Diabetes Brother John Paul mayo    [4]   Social History  Tobacco Use    Smoking status: Never     Passive exposure: Past    Smokeless tobacco: Never   Vaping Use    Vaping status: Never Used   Substance Use Topics    Alcohol use: No    Drug use: Never        Gavino Bahena MD  06/09/25 2791

## 2025-06-09 NOTE — PROGRESS NOTES
"  [facility]        NAME: Jose Manuel Laurent Sr. is a 64 y.o. male  : 1960    MRN: [id number]  DATE: 2025  TIME: 11:04 AM    Assessment and Plan   Dizziness [R42]  1. Dizziness  POCT blood glucose    Transfer to other facility        Recommended pcp fu and advised pt if he could not get in with pcp within the next 48 hours that he should be seen in ED for likely CTA given dizziness/\"off balance\", change in chronic neck pain, and h/o TIA. Pt states he does not think he'll be able to get in that quickly and will just go to ED for work up. Feels comfortable driving himself.    Patient Instructions       Follow up with PCP in 3-5 days.  Proceed to  ER if symptoms worsen.    Chief Complaint     Chief Complaint   Patient presents with    Neck Pain     Pt presents with neck pain that started one month ago, worsening// shoulder pain on going for months// dizziness started two weeks ago/ Hx of Vertigo         History of Present Illness       64 year old PMH HTN and TIA here for 2 weeks of neck pain and feeling off balance. There was no provoking event. He endorses chronic neck pain at baseline, but it has gotten worse and feels different. The pain is midline, and does not radiate. There is no numbness, tingling, vision changes, syncope, weakness, chest pain, or SOB. The pain is made worse with movement of his neck.  He has not taken anything for this. He is feeling off balance with sudden movements and it lasts 1 minute. Has had vertigo before but it did not feel like this. He does not feel nauseous. It always resolves with rest. He does not experience any other symptoms with being off balance. He has not fallen due to this. He denies any difficulty with speech. 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        Review of Systems   Review of Systems   Constitutional:  Negative for chills and " fever.   HENT:  Negative for congestion, ear discharge, ear pain, trouble swallowing and voice change.    Eyes:  Negative for photophobia and visual disturbance.   Respiratory:  Negative for apnea, cough, chest tightness and shortness of breath.    Cardiovascular:  Negative for chest pain and palpitations.   Musculoskeletal:  Positive for neck pain and neck stiffness.   Neurological:  Positive for dizziness. Negative for syncope, speech difficulty, weakness, numbness and headaches.         Current Medications     Current Medications[1]    Current Allergies     Allergies as of 06/09/2025    (No Known Allergies)            The following portions of the patient's history were reviewed and updated as appropriate: allergies, current medications, past family history, past medical history, past social history, past surgical history and problem list.     Past Medical History[2]    Past Surgical History[3]    Family History[4]      Medications have been verified.        Objective   /86 (BP Location: Right arm, Patient Position: Sitting, Cuff Size: Adult)   Pulse 66   Temp 97.5 °F (36.4 °C) (Tympanic)   Resp 16   Wt 98.4 kg (217 lb)   SpO2 98%   BMI 29.43 kg/m²        Physical Exam     Physical Exam  Vitals and nursing note reviewed.   Constitutional:       General: He is not in acute distress.     Appearance: Normal appearance. He is normal weight. He is not ill-appearing.   HENT:      Head: Normocephalic and atraumatic.     Eyes:      Extraocular Movements:      Right eye: Nystagmus present.      Left eye: Nystagmus present.      Conjunctiva/sclera: Conjunctivae normal.      Pupils: Pupils are equal, round, and reactive to light.      Comments: Left beating nystagmus   Neck:      Vascular: No carotid bruit.      Comments: No bony tenderness of cervical spine, but tender in paraspinal region of cervical spine.   Cardiovascular:      Rate and Rhythm: Normal rate and regular rhythm.      Heart sounds: Normal heart  sounds.   Pulmonary:      Effort: Pulmonary effort is normal. No respiratory distress.      Breath sounds: Normal breath sounds. No stridor. No wheezing, rhonchi or rales.     Musculoskeletal:      Cervical back: Normal range of motion and neck supple. Tenderness present. No rigidity.      Right lower leg: No edema.      Left lower leg: No edema.   Lymphadenopathy:      Cervical: No cervical adenopathy.     Skin:     General: Skin is warm and dry.      Capillary Refill: Capillary refill takes less than 2 seconds.      Findings: No rash.     Neurological:      General: No focal deficit present.      Mental Status: He is alert and oriented to person, place, and time.      Cranial Nerves: No cranial nerve deficit.      Sensory: No sensory deficit.      Motor: No weakness.      Coordination: Romberg sign negative.      Gait: Gait normal.     Psychiatric:         Behavior: Behavior normal.                          [1]   Current Outpatient Medications:     aspirin 325 mg tablet, Take 1 tablet (325 mg total) by mouth daily, Disp: 90 tablet, Rfl: 3    atorvastatin (LIPITOR) 40 mg tablet, TAKE ONE TABLET BY MOUTH EVERY DAY (GENERIC FOR LIPITOR), Disp: 30 tablet, Rfl: 5    glipiZIDE (GLUCOTROL) 5 mg tablet, TAKE ONE TABLET BY MOUTH EVERY DAY WITH BREAKFAST (GENERIC FOR GLUCOTROL), Disp: 90 tablet, Rfl: 1    lisinopril (ZESTRIL) 20 mg tablet, TAKE ONE TABLET BY MOUTH EVERY DAY (GENERIC FOR ZESTRIL), Disp: 90 tablet, Rfl: 1    metFORMIN (GLUCOPHAGE) 1000 MG tablet, TAKE ONE TABLET BY MOUTH TWICE A DAY WITH MEALS (GENERIC FOR GLUCOPHAGE), Disp: 60 tablet, Rfl: 5    glucose blood test strip, , Disp: , Rfl:     OneTouch Delica Lancets 33G MISC, , Disp: , Rfl:   [2]   Past Medical History:  Diagnosis Date    Benign essential hypertension     LAST ASSESSED 5/1/17; RESOLVED 5/1/17    Chronic pain disorder     Controlled insulin dependent diabetes mellitus     LAST ASSESSED 4/1/15; RESOLVED 5/2/16    Diabetes mellitus (HCC) Long time  ago    Hyperlipidemia     Hypertension     Low back pain     Neck pain     Osteoarthritis     Pericarditis     Type 2 diabetes mellitus (HCC)    [3]   Past Surgical History:  Procedure Laterality Date    JOINT REPLACEMENT  10 years ago    KNEE SURGERY      NERVE BLOCK Bilateral 07/19/2019    Procedure: C3 C4 C5 C6 Medial Branch Block #1 ([id number]);  Surgeon: Demario Ordaz MD;  Location: [facility] MAIN OR;  Service: Pain Management     NERVE BLOCK Bilateral 08/02/2019    Procedure: C3 C4 C5 C6 Medial Branch Block #2 ([id number]);  Surgeon: Demario Ordaz MD;  Location: [facility] MAIN OR;  Service: Pain Management     RADIOFREQUENCY ABLATION Right 08/16/2019    Procedure: C3 C4 C5 C6 RADIOFREQUENCY ABLATION;  Surgeon: Demario Ordaz MD;  Location: [facility] MAIN OR;  Service: Pain Management     RADIOFREQUENCY ABLATION Left 08/30/2019    Procedure: C3 C4 C5 C6 RADIOFREQUENCY ABLATION;  Surgeon: Demario Ordaz MD;  Location: [facility] MAIN OR;  Service: Pain Management     REPLACEMENT TOTAL KNEE Right     WRIST SURGERY     [4]   Family History  Problem Relation Name Age of Onset    Cancer Family      Cancer Mother Ling paris gael     Cancer Father Jose Manuel Gael     No Known Problems Sister      Cancer Brother Ramakrishna sales gael     No Known Problems Maternal Aunt      No Known Problems Maternal Uncle      No Known Problems Paternal Aunt      No Known Problems Paternal Uncle      No Known Problems Maternal Grandmother      No Known Problems Maternal Grandfather      No Known Problems Paternal Grandmother      No Known Problems Paternal Grandfather      Diabetes Brother John Paul mayo

## 2025-06-14 DIAGNOSIS — G45.9 TIA (TRANSIENT ISCHEMIC ATTACK): ICD-10-CM

## 2025-06-14 DIAGNOSIS — E11.9 TYPE 2 DIABETES MELLITUS WITHOUT COMPLICATION, WITHOUT LONG-TERM CURRENT USE OF INSULIN (HCC): ICD-10-CM

## 2025-06-14 DIAGNOSIS — E78.5 HYPERLIPIDEMIA LDL GOAL <100: ICD-10-CM

## 2025-06-15 LAB
ATRIAL RATE: 54 BPM
P AXIS: 47 DEGREES
PR INTERVAL: 186 MS
QRS AXIS: 57 DEGREES
QRSD INTERVAL: 98 MS
QT INTERVAL: 416 MS
QTC INTERVAL: 394 MS
T WAVE AXIS: 50 DEGREES
VENTRICULAR RATE: 54 BPM

## 2025-06-15 PROCEDURE — 93010 ELECTROCARDIOGRAM REPORT: CPT | Performed by: INTERNAL MEDICINE

## 2025-06-15 RX ORDER — ATORVASTATIN CALCIUM 40 MG/1
40 TABLET, FILM COATED ORAL DAILY
Qty: 30 TABLET | Refills: 5 | Status: SHIPPED | OUTPATIENT
Start: 2025-06-15

## 2025-06-23 ENCOUNTER — OFFICE VISIT (OUTPATIENT)
Dept: URGENT CARE | Facility: CLINIC | Age: 65
End: 2025-06-23
Payer: COMMERCIAL

## 2025-06-23 VITALS
HEART RATE: 84 BPM | OXYGEN SATURATION: 97 % | BODY MASS INDEX: 30.2 KG/M2 | RESPIRATION RATE: 18 BRPM | WEIGHT: 223 LBS | HEIGHT: 72 IN | SYSTOLIC BLOOD PRESSURE: 130 MMHG | TEMPERATURE: 98.4 F | DIASTOLIC BLOOD PRESSURE: 84 MMHG
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
## 2025-06-23 DIAGNOSIS — S60.211A CONTUSION OF RIGHT WRIST, INITIAL ENCOUNTER: ICD-10-CM

## 2025-06-23 PROCEDURE — 99214 OFFICE O/P EST MOD 30 MIN: CPT | Performed by: PHYSICIAN ASSISTANT

## 2025-06-23 RX ORDER — TRIAMCINOLONE ACETONIDE 1 MG/G
OINTMENT TOPICAL 2 TIMES DAILY
Qty: 15 G | Refills: 0 | Status: SHIPPED | OUTPATIENT
Start: 2025-06-23

## 2025-06-23 NOTE — PROGRESS NOTES
[facility]        NAME: Jose Manuel Laurent Sr. is a 64 y.o. male  : 1960    MRN: [id number]  DATE: 2025  TIME: 3:48 PM    Assessment and Plan   Insect bite, unspecified site, initial encounter [W57.XXXA]  1. Insect bite, unspecified site, initial encounter  triamcinolone (KENALOG) 0.1 % ointment      2. Contusion of right wrist, initial encounter          Reviewed his last CBC, PT/INR, APTT which were all WNL. Suspect the contusion is traumatic and will resolve spontaneously. Encouraged him to monitor it and follow up with pcp if no improvement.    Discussed strict return to care precautions as well as red flag symptoms which should prompt immediate ED referral. Pt verbalized understanding and is in agreement with plan.  Please follow up with your primary care provider within the next week. Please remember that your visit today was with an urgent care provider and should not replace follow up with your primary care provider for chronic medical issues or annual physicals.       Patient Instructions       Follow up with PCP in 3-5 days.  Proceed to  ER if symptoms worsen.    Chief Complaint     Chief Complaint   Patient presents with    Insect Bite     Bilateral insect bites to both arms that started saturday         History of Present Illness       64 year old presents for itchy bug bites on his arms for 3 days. 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 That night he noticed bug bite appearing lesions on bilateral arms that were extremely itchy. The bites are not painful. He tried some OTC itch cream with mild relief. Yesterday, he noticed pain while opening the cream and saw a bruise on his right wrist. He says his hand mostly by his thumb is painful with gripping. He denies trauma or injury, but says he did heavy work 3 days ago.  This has never happened to his hand before and denies previous major injury. He chest pain, SOB, throat swelling. or lightheadedness.          Review of Systems   Review of Systems   Constitutional:  Negative for chills and fever.   HENT:  Negative for sneezing, sore throat, trouble swallowing and voice change.    Eyes:  Negative for photophobia and visual disturbance.   Respiratory:  Negative for chest tightness, shortness of breath and wheezing.    Cardiovascular:  Negative for chest pain and leg swelling.   Gastrointestinal:  Negative for abdominal pain, diarrhea, nausea and vomiting.   Musculoskeletal:  Negative for neck pain and neck stiffness.   Skin:  Positive for rash.   Neurological:  Negative for dizziness, light-headedness and numbness.         Current Medications     Current Medications[1]    Current Allergies     Allergies as of 06/23/2025    (No Known Allergies)            The following portions of the patient's history were reviewed and updated as appropriate: allergies, current medications, past family history, past medical history, past social history, past surgical history and problem list.     Past Medical History[2]    Past Surgical History[3]    Family History[4]      Medications have been verified.        Objective   /84   Pulse 84   Temp 98.4 °F (36.9 °C)   Resp 18   Ht 6' (1.829 m)   Wt 101 kg (223 lb)   SpO2 97%   BMI 30.24 kg/m²        Physical Exam     Physical Exam  Vitals and nursing note reviewed.   Constitutional:       General: He is not in acute distress.     Appearance: Normal appearance. He is not ill-appearing.   HENT:      Head: Normocephalic and atraumatic.      Mouth/Throat:      Mouth: Mucous membranes are moist.      Pharynx: No oropharyngeal exudate or posterior oropharyngeal erythema.     Eyes:      Extraocular Movements: Extraocular movements intact.      Conjunctiva/sclera: Conjunctivae normal.       Cardiovascular:      Rate and Rhythm: Normal rate and regular rhythm.      Heart sounds: Normal heart sounds.   Pulmonary:      Effort: Pulmonary effort is normal. No respiratory distress.       Breath sounds: Normal breath sounds. No stridor. No wheezing, rhonchi or rales.     Musculoskeletal:      Right wrist: Tenderness present. No swelling. Normal range of motion. Normal pulse.      Right hand: Tenderness present. No swelling. Normal range of motion. Normal pulse.      Cervical back: Normal range of motion and neck supple. No rigidity.      Comments: Mild pain to palpation of right thenar eminence. Pain made worse with gripping and thumb adduction.      Skin:     General: Skin is warm and dry.      Capillary Refill: Capillary refill takes less than 2 seconds.      Findings: Bruising, lesion and rash present.      Comments: On bilateral arms from wrist to elbow: Multiple bug bite lesions scattered throughout. No drainage or vesicles.     Ecchymosis from volar wrist to thenar eminence.      Neurological:      Mental Status: He is alert and oriented to person, place, and time.     Psychiatric:         Behavior: Behavior normal.                          [1]   Current Outpatient Medications:     aspirin 325 mg tablet, Take 1 tablet (325 mg total) by mouth daily, Disp: 90 tablet, Rfl: 3    atorvastatin (LIPITOR) 40 mg tablet, TAKE ONE TABLET BY MOUTH EVERY DAY, Disp: 30 tablet, Rfl: 5    glipiZIDE (GLUCOTROL) 5 mg tablet, TAKE ONE TABLET BY MOUTH EVERY DAY WITH BREAKFAST (GENERIC FOR GLUCOTROL), Disp: 90 tablet, Rfl: 1    lisinopril (ZESTRIL) 20 mg tablet, TAKE ONE TABLET BY MOUTH EVERY DAY (GENERIC FOR ZESTRIL), Disp: 90 tablet, Rfl: 1    meclizine (ANTIVERT) 25 mg tablet, Take 1 tablet (25 mg total) by mouth 3 (three) times a day as needed for dizziness, Disp: 30 tablet, Rfl: 0    metFORMIN (GLUCOPHAGE) 1000 MG tablet, TAKE ONE TABLET BY MOUTH TWICE A DAY WITH MEALS (GENERIC FOR GLUCOPHAGE), Disp: 60 tablet, Rfl: 5    triamcinolone (KENALOG) 0.1 % ointment, Apply topically 2 (two) times a day, Disp: 15 g, Rfl: 0    glucose blood test strip, , Disp: , Rfl:     OneTouch Delica Lancets 33G MISC, , Disp: , Rfl:    [2]   Past Medical History:  Diagnosis Date    Benign essential hypertension     LAST ASSESSED 5/1/17; RESOLVED 5/1/17    Chronic pain disorder     Controlled insulin dependent diabetes mellitus     LAST ASSESSED 4/1/15; RESOLVED 5/2/16    Diabetes mellitus (HCC) Long time ago    Hyperlipidemia     Hypertension     Low back pain     Neck pain     Osteoarthritis     Pericarditis     Type 2 diabetes mellitus (HCC)    [3]   Past Surgical History:  Procedure Laterality Date    JOINT REPLACEMENT  10 years ago    KNEE SURGERY      NERVE BLOCK Bilateral 07/19/2019    Procedure: C3 C4 C5 C6 Medial Branch Block #1 ([id number]);  Surgeon: Demario Ordaz MD;  Location: [facility] MAIN OR;  Service: Pain Management     NERVE BLOCK Bilateral 08/02/2019    Procedure: C3 C4 C5 C6 Medial Branch Block #2 ([id number]);  Surgeon: Demario Ordaz MD;  Location: [facility] MAIN OR;  Service: Pain Management     RADIOFREQUENCY ABLATION Right 08/16/2019    Procedure: C3 C4 C5 C6 RADIOFREQUENCY ABLATION;  Surgeon: Demario Ordaz MD;  Location: [facility] MAIN OR;  Service: Pain Management     RADIOFREQUENCY ABLATION Left 08/30/2019    Procedure: C3 C4 C5 C6 RADIOFREQUENCY ABLATION;  Surgeon: Demario Ordaz MD;  Location: [facility] MAIN OR;  Service: Pain Management     REPLACEMENT TOTAL KNEE Right     WRIST SURGERY     [4]   Family History  Problem Relation Name Age of Onset    Cancer Family      Cancer Mother Ling paris gael     Cancer Father Jose Manuel Mayo     No Known Problems Sister      Cancer Brother Ramakrishna sales gael     No Known Problems Maternal Aunt      No Known Problems Maternal Uncle      No Known Problems Paternal Aunt      No Known Problems Paternal Uncle      No Known Problems Maternal Grandmother      No Known Problems Maternal Grandfather      No Known Problems Paternal Grandmother      No Known Problems Paternal Grandfather      Diabetes Brother John Paul mayo

## 2025-07-16 DIAGNOSIS — E11.9 TYPE 2 DIABETES MELLITUS WITHOUT COMPLICATION, WITHOUT LONG-TERM CURRENT USE OF INSULIN (HCC): ICD-10-CM

## 2025-07-17 RX ORDER — GLIPIZIDE 5 MG/1
TABLET ORAL
Qty: 90 TABLET | Refills: 1 | Status: SHIPPED | OUTPATIENT
Start: 2025-07-17

## (undated) DEVICE — WIPES BABY PAMPERS SENSITIVE 36/PK

## (undated) DEVICE — DRAPE SHEET THREE QUARTER

## (undated) DEVICE — SYRINGE 5ML LL

## (undated) DEVICE — PAD GROUNDING SLF ADHESIVE

## (undated) DEVICE — CHLORAPREP APPLICATOR TINTED 10.5ML ONE-STEP

## (undated) DEVICE — TOWEL SET X-RAY

## (undated) DEVICE — SMALL NEEDLE COUNTER NEST

## (undated) DEVICE — UNIVERSAL BLOCK TRAY: Brand: INTEGRA®

## (undated) DEVICE — CVD CANNULA

## (undated) DEVICE — PLASTIC ADHESIVE BANDAGE: Brand: CURITY

## (undated) DEVICE — SKIN MARKER DUAL TIP WITH RULER CAP, FLEXIBLE RULER AND LABELS: Brand: DEVON

## (undated) DEVICE — NEEDLE BLUNT 18 G X 1 1/2 W FILTER

## (undated) DEVICE — RADIOLOGY STERILE LABELS: Brand: CENTURION

## (undated) DEVICE — NEEDLE SPINAL 25G X 3.5 IN QUINCKE

## (undated) DEVICE — FLEXIBLE ADHESIVE BANDAGE,X-LARGE: Brand: CURITY

## (undated) DEVICE — GLOVE SRG BIOGEL 7.5